# Patient Record
Sex: MALE | Race: WHITE | Employment: FULL TIME | URBAN - METROPOLITAN AREA
[De-identification: names, ages, dates, MRNs, and addresses within clinical notes are randomized per-mention and may not be internally consistent; named-entity substitution may affect disease eponyms.]

---

## 2017-10-10 ENCOUNTER — OFFICE VISIT (OUTPATIENT)
Dept: INTERNAL MEDICINE CLINIC | Age: 32
End: 2017-10-10

## 2017-10-10 VITALS
SYSTOLIC BLOOD PRESSURE: 127 MMHG | WEIGHT: 182 LBS | RESPIRATION RATE: 15 BRPM | OXYGEN SATURATION: 97 % | TEMPERATURE: 99 F | DIASTOLIC BLOOD PRESSURE: 86 MMHG | HEIGHT: 71 IN | HEART RATE: 76 BPM | BODY MASS INDEX: 25.48 KG/M2

## 2017-10-10 DIAGNOSIS — Z23 ENCOUNTER FOR IMMUNIZATION: Primary | ICD-10-CM

## 2017-10-10 DIAGNOSIS — J45.20 MILD INTERMITTENT ASTHMA WITHOUT COMPLICATION: ICD-10-CM

## 2017-10-10 DIAGNOSIS — M62.838 MUSCLE SPASM OF RIGHT SHOULDER: ICD-10-CM

## 2017-10-10 RX ORDER — ALBUTEROL SULFATE 90 UG/1
AEROSOL, METERED RESPIRATORY (INHALATION)
COMMUNITY
End: 2018-04-10 | Stop reason: SDUPTHER

## 2017-10-10 RX ORDER — ALBUTEROL SULFATE 90 UG/1
2 AEROSOL, METERED RESPIRATORY (INHALATION)
Qty: 1 INHALER | Refills: 6 | Status: SHIPPED | OUTPATIENT
Start: 2017-10-10 | End: 2017-10-10 | Stop reason: SDUPTHER

## 2017-10-10 RX ORDER — ALBUTEROL SULFATE 90 UG/1
2 AEROSOL, METERED RESPIRATORY (INHALATION)
Qty: 1 INHALER | Refills: 6 | Status: SHIPPED | OUTPATIENT
Start: 2017-10-10 | End: 2019-07-29 | Stop reason: SDUPTHER

## 2017-10-10 RX ORDER — BISMUTH SUBSALICYLATE 262 MG
1 TABLET,CHEWABLE ORAL DAILY
COMMUNITY
End: 2018-12-01

## 2017-10-10 NOTE — PROGRESS NOTES
ROOM # 8    Roxy Lynn presents today for   Chief Complaint   Patient presents with   [facility] New Patient    Asthma       Roxy Lynn preferred language for health care discussion is english/other. Is someone accompanying this pt? no    Is the patient using any DME equipment during OV? no    Depression Screening:  PHQ over the last two weeks 10/10/2017   Little interest or pleasure in doing things Not at all   Feeling down, depressed or hopeless Not at all   Total Score PHQ 2 0       Learning Assessment:  Learning Assessment 10/10/2017   PRIMARY LEARNER Patient   HIGHEST LEVEL OF EDUCATION - PRIMARY LEARNER  > 4 YEARS OF COLLEGE   BARRIERS PRIMARY LEARNER NONE   CO-LEARNER CAREGIVER No   PRIMARY LANGUAGE ENGLISH   LEARNER PREFERENCE PRIMARY DEMONSTRATION   ANSWERED BY patient   RELATIONSHIP SELF       Abuse Screening:  No flowsheet data found. Fall Risk  No flowsheet data found. Health Maintenance reviewed and discussed per provider. Yes        Advance Directive:  1. Do you have an advance directive in place? Patient Reply: no    2. If not, would you like material regarding how to put one in place? Patient Reply: no    Coordination of Care:  1. Have you been to the ER, urgent care clinic since your last visit? Hospitalized since your last visit? no    2. Have you seen or consulted any other health care providers outside of the [address] [place] [address] since your last visit? Include any pap smears or colon screening. brittani Lynn is a 32 y.o. male who presents for routine immunizations. He denies any symptoms , reactions or allergies that would exclude them from being immunized today. Risks and adverse reactions were discussed and the VIS was given to them. All questions were addressed. He was observed for 10 min post injection. There were no reactions observed.     Mally Kim LPN

## 2017-10-10 NOTE — PATIENT INSTRUCTIONS
Vaccine Information Statement    Influenza (Flu) Vaccine (Inactivated or Recombinant): What you need to know    Many Vaccine Information Statements are available in Wolof and other languages. See www.immunize.org/vis  Hojas de Información Sobre Vacunas están disponibles en Español y en muchos otros idiomas. Visite www.immunize.org/vis    1. Why get vaccinated? Influenza (flu) is a contagious disease that spreads around the [place] every year, usually between October and May. Flu is caused by influenza viruses, and is spread mainly by coughing, sneezing, and close contact. Anyone can get flu. Flu strikes suddenly and can last several days. Symptoms vary by age, but can include:   fever/chills   sore throat   muscle aches   fatigue   cough   headache    runny or stuffy nose    Flu can also lead to pneumonia and blood infections, and cause diarrhea and seizures in children. If you have a medical condition, such as heart or lung disease, flu can make it worse. Flu is more dangerous for some people. Infants and young children, people 72years of age and older, pregnant women, and people with certain health conditions or a weakened immune system are at greatest risk. Each year thousands of people in the [facility] die from flu, and many more are hospitalized. Flu vaccine can:   keep you from getting flu,   make flu less severe if you do get it, and   keep you from spreading flu to your family and other people. 2. Inactivated and recombinant flu vaccines    A dose of flu vaccine is recommended every flu season. Children 6 months through 6years of age may need two doses during the same flu season. Everyone else needs only one dose each flu season.        Some inactivated flu vaccines contain a very small amount of a mercury-based preservative called thimerosal. Studies have not shown thimerosal in vaccines to be harmful, but flu vaccines that do not contain thimerosal are available. There is no live flu virus in flu shots. They cannot cause the flu. There are many flu viruses, and they are always changing. Each year a new flu vaccine is made to protect against three or four viruses that are likely to cause disease in the upcoming flu season. But even when the vaccine doesnt exactly match these viruses, it may still provide some protection    Flu vaccine cannot prevent:   flu that is caused by a virus not covered by the vaccine, or   illnesses that look like flu but are not. It takes about 2 weeks for protection to develop after vaccination, and protection lasts through the flu season. 3. Some people should not get this vaccine    Tell the person who is giving you the vaccine:     If you have any severe, life-threatening allergies. If you ever had a life-threatening allergic reaction after a dose of flu vaccine, or have a severe allergy to any part of this vaccine, you may be advised not to get vaccinated. Most, but not all, types of flu vaccine contain a small amount of egg protein.  If you ever had Guillain-Barré Syndrome (also called GBS). Some people with a history of GBS should not get this vaccine. This should be discussed with your doctor.  If you are not feeling well. It is usually okay to get flu vaccine when you have a mild illness, but you might be asked to come back when you feel better. 4. Risks of a vaccine reaction    With any medicine, including vaccines, there is a chance of reactions. These are usually mild and go away on their own, but serious reactions are also possible. Most people who get a flu shot do not have any problems with it.      Minor problems following a flu shot include:    soreness, redness, or swelling where the shot was given     hoarseness   sore, red or itchy eyes   cough   fever   aches   headache   itching   fatigue  If these problems occur, they usually begin soon after the shot and last 1 or 2 days. More serious problems following a flu shot can include the following:     There may be a small increased risk of Guillain-Barré Syndrome (GBS) after inactivated flu vaccine. This risk has been estimated at 1 or 2 additional cases per million people vaccinated. This is much lower than the risk of severe complications from flu, which can be prevented by flu vaccine.  Young children who get the flu shot along with pneumococcal vaccine (PCV13) and/or DTaP vaccine at the same time might be slightly more likely to have a seizure caused by fever. Ask your doctor for more information. Tell your doctor if a child who is getting flu vaccine has ever had a seizure. Problems that could happen after any injected vaccine:      People sometimes faint after a medical procedure, including vaccination. Sitting or lying down for about 15 minutes can help prevent fainting, and injuries caused by a fall. Tell your doctor if you feel dizzy, or have vision changes or ringing in the ears.  Some people get severe pain in the shoulder and have difficulty moving the arm where a shot was given. This happens very rarely.  Any medication can cause a severe allergic reaction. Such reactions from a vaccine are very rare, estimated at about 1 in a million doses, and would happen within a few minutes to a few hours after the vaccination. As with any medicine, there is a very remote chance of a vaccine causing a serious injury or death. The safety of vaccines is always being monitored. For more information, visit: www.cdc.gov/vaccinesafety/    5. What if there is a serious reaction? What should I look for?  Look for anything that concerns you, such as signs of a severe allergic reaction, very high fever, or unusual behavior.     Signs of a severe allergic reaction can include hives, swelling of the face and throat, difficulty breathing, a fast heartbeat, dizziness, and weakness - usually within a few minutes to a few hours after the vaccination. What should I do?  If you think it is a severe allergic reaction or other emergency that cant wait, call 9-1-1 and get the person to the nearest hospital. Otherwise, call your doctor.  Reactions should be reported to the Vaccine Adverse Event Reporting System (VAERS). Your doctor should file this report, or you can do it yourself through  the VAERS web site at www.vaers. [facility].gov, or by calling 9-231.443.7107. VAERS does not give medical advice. 6. The National Vaccine Injury Compensation Program    The [facility] Vaccine Injury Compensation Program (VICP) is a federal program that was created to compensate people who may have been injured by certain vaccines. Persons who believe they may have been injured by a vaccine can learn about the program and about filing a claim by calling 6-406.751.8192 or visiting the [employer] website at www.[facility].gov/vaccinecompensation. There is a time limit to file a claim for compensation. 7. How can I learn more?  Ask your healthcare provider. He or she can give you the vaccine package insert or suggest other sources of information.  Call your local or state health department.  Contact the Centers for Disease Control and Prevention (CDC):  - Call 4-605.733.6494 (1-800-CDC-INFO) or  - Visit CDCs website at www.cdc.gov/flu    Vaccine Information Statement   Inactivated Influenza Vaccine   8/7/2015  42 RUTH Brid[address] Tse [id number]    Department of Health and Human Services  Centers for Disease Control and Prevention    Office Use Only

## 2017-10-10 NOTE — MR AVS SNAPSHOT
Visit Information Date & Time Provider Department Dept. Phone Encounter #  
 10/10/2017  9:00 AM Thelma Calle Aasonn [phone] [id number] Upcoming Health Maintenance Date Due DTaP/Tdap/Td series (1 - Tdap) 12/30/2006 INFLUENZA AGE 9 TO ADULT 8/1/2017 Allergies as of 10/10/2017  Review Complete On: 10/10/2017 By: Sergei Scott LPN Severity Noted Reaction Type Reactions Sulfa (Sulfonamide Antibiotics)  10/10/2017    Hives Current Immunizations  Never Reviewed Name Date Influenza Vaccine (Quad) PF  Incomplete Not reviewed this visit You Were Diagnosed With   
  
 Codes Comments Encounter for immunization    -  Primary ICD-10-CM: E57 ICD-9-CM: V03.89 Mild intermittent asthma without complication     CPL-44-YZ: J45.20 ICD-9-CM: 493.90 Muscle spasm of right shoulder     ICD-10-CM: G90.181 ICD-9-CM: 728.85 Vitals BP Pulse Temp Resp Height(growth percentile) Weight(growth percentile) 127/86 76 99 °F (37.2 °C) (Oral) 15 5' 11\" (1.803 m) 182 lb (82.6 kg) SpO2 BMI Smoking Status 97% 25.38 kg/m2 Never Smoker BMI and BSA Data Body Mass Index Body Surface Area  
 25.38 kg/m 2 2.03 m 2 Preferred Pharmacy Pharmacy Name Phone [id number], [address] [place] [address] Jesse Gardiner [phone] Your Updated Medication List  
  
   
This list is accurate as of: 10/10/17  9:15 AM.  Always use your most recent med list.  
  
  
  
  
 * albuterol 90 mcg/actuation inhaler Commonly known as:  PROVENTIL HFA, VENTOLIN HFA, PROAIR HFA Take  by inhalation. * albuterol 90 mcg/actuation inhaler Commonly known as:  PROVENTIL HFA, VENTOLIN HFA, PROAIR HFA Take 2 Puffs by inhalation every four (4) hours as needed for Wheezing. inhalational spacing device 1 Each by Does Not Apply route as needed. multivitamin tablet Commonly known as:  ONE A DAY Take 1 Tab by mouth daily. * Notice: This list has 2 medication(s) that are the same as other medications prescribed for you. Read the directions carefully, and ask your doctor or other care provider to review them with you. Prescriptions Printed Refills  
 albuterol (PROVENTIL HFA, VENTOLIN HFA, PROAIR HFA) 90 mcg/actuation inhaler 6 Sig: Take 2 Puffs by inhalation every four (4) hours as needed for Wheezing. Class: Print Route: Inhalation  
 inhalational spacing device 0 Si Each by Does Not Apply route as needed. Class: Print Route: Does Not Apply We Performed the Following INFLUENZA VIRUS VAC QUAD,SPLIT,PRESV FREE SYRINGE IM [id number] CPT(R)] REFERRAL TO PHYSICAL THERAPY [FJI82 Custom] Comments:  
 Right shoulder spasm and intermittent pain Referral Information Referral ID Referred By Referred To  
  
 [id number] [facility], [place] [address] Phone: [phone] Visits Status Start Date End Date 1 New Request 10/10/17 10/10/18 If your referral has a status of pending review or denied, additional information will be sent to support the outcome of this decision. Patient Instructions Vaccine Information Statement Influenza (Flu) Vaccine (Inactivated or Recombinant): What you need to know Many Vaccine Information Statements are available in Bermudian and other languages. See www.immunize.org/vis Hojas de Información Sobre Vacunas están disponibles en Español y en muchos otros idiomas. Visite www.immunize.org/vis 1. Why get vaccinated? Influenza (flu) is a contagious disease that spreads around the [place] every year, usually between October and May. Flu is caused by influenza viruses, and is spread mainly by coughing, sneezing, and close contact. Anyone can get flu. Flu strikes suddenly and can last several days. Symptoms vary by age, but can include: 
 fever/chills  sore throat  muscle aches  fatigue  cough  headache  runny or stuffy nose Flu can also lead to pneumonia and blood infections, and cause diarrhea and seizures in children. If you have a medical condition, such as heart or lung disease, flu can make it worse. Flu is more dangerous for some people. Infants and young children, people 72years of age and older, pregnant women, and people with certain health conditions or a weakened immune system are at greatest risk. Each year thousands of people in the [facility] die from flu, and many more are hospitalized. Flu vaccine can: 
 keep you from getting flu, 
 make flu less severe if you do get it, and 
 keep you from spreading flu to your family and other people. 2. Inactivated and recombinant flu vaccines A dose of flu vaccine is recommended every flu season. Children 6 months through 6years of age may need two doses during the same flu season. Everyone else needs only one dose each flu season. Some inactivated flu vaccines contain a very small amount of a mercury-based preservative called thimerosal. Studies have not shown thimerosal in vaccines to be harmful, but flu vaccines that do not contain thimerosal are available. There is no live flu virus in flu shots. They cannot cause the flu. There are many flu viruses, and they are always changing. Each year a new flu vaccine is made to protect against three or four viruses that are likely to cause disease in the upcoming flu season. But even when the vaccine doesnt exactly match these viruses, it may still provide some protection Flu vaccine cannot prevent: 
 flu that is caused by a virus not covered by the vaccine, or 
 illnesses that look like flu but are not.  
 
It takes about 2 weeks for protection to develop after vaccination, and protection lasts through the flu season. 3. Some people should not get this vaccine Tell the person who is giving you the vaccine:  If you have any severe, life-threatening allergies. If you ever had a life-threatening allergic reaction after a dose of flu vaccine, or have a severe allergy to any part of this vaccine, you may be advised not to get vaccinated. Most, but not all, types of flu vaccine contain a small amount of egg protein.  If you ever had Guillain-Barré Syndrome (also called GBS). Some people with a history of GBS should not get this vaccine. This should be discussed with your doctor.  If you are not feeling well. It is usually okay to get flu vaccine when you have a mild illness, but you might be asked to come back when you feel better. 4. Risks of a vaccine reaction With any medicine, including vaccines, there is a chance of reactions. These are usually mild and go away on their own, but serious reactions are also possible. Most people who get a flu shot do not have any problems with it. Minor problems following a flu shot include:  
 soreness, redness, or swelling where the shot was given  hoarseness  sore, red or itchy eyes  cough  fever  aches  headache  itching  fatigue If these problems occur, they usually begin soon after the shot and last 1 or 2 days. More serious problems following a flu shot can include the following:  There may be a small increased risk of Guillain-Barré Syndrome (GBS) after inactivated flu vaccine. This risk has been estimated at 1 or 2 additional cases per million people vaccinated. This is much lower than the risk of severe complications from flu, which can be prevented by flu vaccine.    
 
 Young children who get the flu shot along with pneumococcal vaccine (PCV13) and/or DTaP vaccine at the same time might be slightly more likely to have a seizure caused by fever. Ask your doctor for more information. Tell your doctor if a child who is getting flu vaccine has ever had a seizure. Problems that could happen after any injected vaccine:  People sometimes faint after a medical procedure, including vaccination. Sitting or lying down for about 15 minutes can help prevent fainting, and injuries caused by a fall. Tell your doctor if you feel dizzy, or have vision changes or ringing in the ears.  Some people get severe pain in the shoulder and have difficulty moving the arm where a shot was given. This happens very rarely.  Any medication can cause a severe allergic reaction. Such reactions from a vaccine are very rare, estimated at about 1 in a million doses, and would happen within a few minutes to a few hours after the vaccination. As with any medicine, there is a very remote chance of a vaccine causing a serious injury or death. The safety of vaccines is always being monitored. For more information, visit: www.cdc.gov/vaccinesafety/ 
 
5. What if there is a serious reaction? What should I look for?  Look for anything that concerns you, such as signs of a severe allergic reaction, very high fever, or unusual behavior. Signs of a severe allergic reaction can include hives, swelling of the face and throat, difficulty breathing, a fast heartbeat, dizziness, and weakness  usually within a few minutes to a few hours after the vaccination. What should I do?  If you think it is a severe allergic reaction or other emergency that cant wait, call 9-1-1 and get the person to the nearest hospital. Otherwise, call your doctor.  Reactions should be reported to the Vaccine Adverse Event Reporting System (VAERS). Your doctor should file this report, or you can do it yourself through  the VAERS web site at www.vaers. [facility].gov, or by calling 4-869.366.1804. VAERS does not give medical advice. 6. The National Vaccine Injury Compensation Program 
 
The [facility] Vaccine Injury Compensation Program (VICP) is a federal program that was created to compensate people who may have been injured by certain vaccines. Persons who believe they may have been injured by a vaccine can learn about the program and about filing a claim by calling 7-717.247.9082 or visiting the [employer] website at www.[facility].gov/vaccinecompensation. There is a time limit to file a claim for compensation. 7. How can I learn more?  Ask your healthcare provider. He or she can give you the vaccine package insert or suggest other sources of information.  Call your local or state health department.  Contact the Centers for Disease Control and Prevention (CDC): 
- Call 7-704.126.9177 (0-190-TPO-INFO) or 
- Visit CDCs website at www.cdc.gov/flu Vaccine Information Statement Inactivated Influenza Vaccine 2015 
42 RUTH Dewey [id number] [facility] and [place] Centers for Disease Control and Prevention Office Use Only Introducing [facility]! [facility] introduces Definicare patient portal. Now you can access parts of your medical record, email your doctor's office, and request medication refills online. 1. In your internet browser, go to [email or web address] 2. Click on the First Time User? Click Here link in the Sign In box. You will see the New Member Sign Up page. 3. Enter your Definicare Access Code exactly as it appears below. You will not need to use this code after youve completed the sign-up process. If you do not sign up before the expiration date, you must request a new code. · Definicare Access Code: [email or web address] Expires: 2018  9:11 AM 
 
4. Enter the last four digits of your Social Security Number (xxxx) and Date of Birth (mm/dd/yyyy) as indicated and click Submit. You will be taken to the next sign-up page. 5. Create a Ravenflow ID. This will be your Ravenflow login ID and cannot be changed, so think of one that is secure and easy to remember. 6. Create a Ravenflow password. You can change your password at any time. 7. Enter your Password Reset Question and Answer. This can be used at a later time if you forget your password. 8. Enter your e-mail address. You will receive e-mail notification when new information is available in [address] 9. Click Sign Up. You can now view and download portions of your medical record. 10. Click the Download Summary menu link to download a portable copy of your medical information. If you have questions, please visit the Frequently Asked Questions section of the Ravenflow website. Remember, Ravenflow is NOT to be used for urgent needs. For medical emergencies, dial 911. Now available from your iPhone and Android! Please provide this summary of care documentation to your next provider. Your primary care clinician is listed as Shashank Velasquez. If you have any questions after today's visit, please call [phone].

## 2017-10-10 NOTE — PROGRESS NOTES
FAMILY MEDICINE CLINIC NOTE    S: The patient presents for establishment of care. He has a medical history significant for asthma. He does not utilize albuterol very often. He does not utilize a spacer. Takes claritin intermittently. He has never been hospitalized for his asthma. Right shoulder spasm and pain for the last few months, bothersome intermittently. Started when lifting during a move. He would like a flu shot today    No current outpatient prescriptions on file prior to visit. No current facility-administered medications on file prior to visit. Past Medical History:   Diagnosis Date    Asthma     Eczema        Social History     Social History    Marital status:      Spouse name: N/A    Number of children: N/A    Years of education: N/A     Occupational History    Not on file. Social History Main Topics    Smoking status: Never Smoker    Smokeless tobacco: Never Used    Alcohol use 4.8 oz/week     2 Glasses of wine, 4 Cans of beer, 2 Shots of liquor per week    Drug use: No    Sexual activity: Yes     Birth control/ protection: None     Other Topics Concern    Not on file     Social History Narrative    No narrative on file       Family History   Problem Relation Age of Onset    Thyroid Disease Mother     Osteoporosis Mother     No Known Problems Father        O:  Visit Vitals    /86    Pulse 76    Temp 99 °F (37.2 °C) (Oral)    Resp 15    Ht 5' 11\" (1.803 m)    Wt 182 lb (82.6 kg)    SpO2 97%    BMI 25.38 kg/m2     NAD, comfortable  RRR, no murmurs  CTABL, no wheezing/ronchi/rales  Mild spasm along the medial border of the right scapula, to TTP     32 y.o. male      ICD-10-CM ICD-9-CM    1. Encounter for immunization Z23 V03.89 INFLUENZA VIRUS VAC QUAD,SPLIT,PRESV FREE SYRINGE IM   2.  Mild intermittent asthma without complication J98.63 622.75 albuterol (PROVENTIL HFA, VENTOLIN HFA, PROAIR HFA) 90 mcg/actuation inhaler      inhalational spacing device      DISCONTINUED: albuterol (PROVENTIL HFA, VENTOLIN HFA, PROAIR HFA) 90 mcg/actuation inhaler      DISCONTINUED: inhalational spacing device   3.  Muscle spasm of right shoulder M62.838 728.85 REFERRAL TO PHYSICAL THERAPY

## 2017-10-20 ENCOUNTER — HOSPITAL ENCOUNTER (OUTPATIENT)
Dept: PHYSICAL THERAPY | Age: 32
Discharge: HOME | End: 2017-10-20
Payer: COMMERCIAL

## 2017-10-20 PROCEDURE — 97162 PT EVAL MOD COMPLEX 30 MIN: CPT

## 2017-10-20 PROCEDURE — 97014 ELECTRIC STIMULATION THERAPY: CPT

## 2017-10-20 PROCEDURE — 97110 THERAPEUTIC EXERCISES: CPT

## 2017-10-20 NOTE — PROGRESS NOTES
[facility] PHYSICAL THERAPY AT 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   ========================================================================  Assessment / key information:  Pt is a 32y.o. year old male who presents with co R shoulder pain, decreased mobility and strength after moving heavy boxes in late 2017. Patient denies popping, but residual pain the day after moving. Pain is mostly present with increased activity. Patient is R hand dominant. Pain relief via IBuprofen as needed. PMH signifiant for sciatica. Current deficits include: increased pain to 6/10 at worst, decreased AROM  Per chart ([facility] ), strength grossly 4+ GH and 4/5 SA but with signficant compensation when testing SA and LT, small TRP of R LS sec to compensatory overuse. Functional deficits include: reaching overhead, lifting and playing with 2yo and 2 yo, work duties: : print making, heavy lifting, prolonged use :5 min,  rec activities: cycling , yoga, stretching . Home exercise program initiated on initial evaluation to address these deficits. Pt would benefit from PT to address these deficits for increased functional mobility and QOL.       AROM                                                                Left Right   Flexion   96   Extension       Scaption/ABD   114   ER @ 0 Degrees       IR/ADD T2 T5       ========================================================================  Eval Complexity: History: MEDIUM  Complexity : 1-2 comorbidities / personal factors will impact the outcome/ POC Exam:HIGH Complexity : 4+ Standardized tests and measures addressing body structure, function, activity limitation and / or participation in recreation  Presentation: MEDIUM Complexity : Evolving with changing characteristics  Clinical Decision Making:MEDIUM Complexity : FOTO score of 26-74Overall Complexity:MEDIUM  Problem List: pain affecting function, decrease ROM, decrease strength, impaired gait/ balance, decrease ADL/ functional abilitiies, decrease activity tolerance, decrease flexibility/ joint mobility and other FOTO 62/100   Treatment Plan may include any combination of the following: Therapeutic exercise, Therapeutic activities, Neuromuscular re-education, Physical agent/modality, Gait/balance training, Manual therapy, Aquatic therapy, Patient education, Self Care training and Functional mobility training  Patient / Family readiness to learn indicated by: asking questions, trying to perform skills and interest  Persons(s) to be included in education: patient (P)  Barriers to Learning/Limitations: None  Measures taken:    Patient Goal (s): \"increase mobility and strength, normal movement of the shoulder \"   Patient self reported health status: good  Rehabilitation Potential: good   Short Term Goals: To be accomplished in  1  weeks:  1. Pt will be independent and compliant with HEP   Long Term Goals: To be accomplished in  8-12  treatments:  1. Patient will increase FOTO score to 78/100 for indications of increased functional mobility. 2.  Patient will demo increased AROM flexion to 140 deg for ease with repeated overhead activity   3.  Patient will demo 4+/5 SA strength without UT compensation for improve scapular stability with arm elevation   4. Patient will report increased activity tolerance to 15 min with R UE for work duties for progression to PLOF   Frequency / Duration:   Patient to be seen  2-3  times per week for 8-12  treatments:  Patient / Caregiver education and instruction: self care, activity modification and exercises  G-Codes (GP): ARGELIA  Therapist Signature: Luzmaria Mahmood PT Date: 41/29/2190   Certification Period: NA Time: 1033am    ========================================================================  I certify that the above Physical Therapy Services are being furnished while the patient is under my care. I agree with the treatment plan and certify that this therapy is necessary. Physician Signature:        Date:       Time:   Please sign and return to [facility] or you may fax the signed copy to [phone]. Thank you.

## 2017-10-20 NOTE — PROGRESS NOTES
PT SHOULDER EVAL AND TREATMENT      Patient Name: Connor Abbott  Date:10/20/2017  : 1985  [x]  Patient  Verified  Payor: BLUE CROSS / Plan: [employer] [id number] PPO / Product Type: PPO /    In time:935  Out time:1025  Total Treatment Time (min): 50  Visit #: 1 of -12    Treatment Area: Acute pain of right shoulder [M25.511]    SUBJECTIVE  Pain Level (0-10 scale):  (C): 0 (B):0  (W):  6  Any medication changes, allergies to medications, diagnosis change, or new procedure performed: see summary sheet for update  Subjective functional status/changes:  CHIEF COMPLAINT:  Patient reports with co R shoulder pain, decreased mobility and strength after moving heavy boxes in late 2017. Patient denies popping, but residual pain the day after moving. Pain is mostly present with increased activity. Patient is R hand dominant. Pain relief via IBuprofen as needed. PMH signifiant for sciatica   DEFICITS: reaching overhead, lifting and playing with 2yo and 2 yo, work duties: : [employer], heavy lifting, prolonged use :5 min,  rec activities: cycling , yoga, stretching   OBJECTIVE:   Posture: scap protraction, winging  and instability   Rounded shoulders     ROM:  Referred pain with scaption                                            AROM                                                              PROM   Left Right  Left Right   Flexion  96 Flexion  WNL   Extension   Extension     Scaption/ABD  114 Scaptin/ABD  WNL   ER @ 0 Degrees   ER @ 0 Degrees     IR/ADD T2 T5 IR/ADD       Strength:                                                                         L (1-5) R (1-5) Pain   Flexors 5 4+ _ Yes   [] No   Abductors 5 4+ [] Yes   [] No   External Rotators 5 5 [] Yes   [] No   Internal Rotators 5 5 [] Yes   [] No   Lower Trapezius 4+ 4+ [] Yes   [] No   Elbow Flexion 5 5 [] Yes   [] No   Elbow Extension 5 5 [] Yes   [] No   Serratus anterior   4 compensation    strength :   WNL Scapulohumoral Control / Rhythm:  Able to eccentrically lower with good control?  Left: [x] Yes   [] No     Right: [x] Yes   [] No    Palpation  [x] Min  [] Mod  [] Severe    Location: small trigger point L LS     Other Tests / Comments:     Patient Education/ Therapeutic Exercise : [x] Discussed POT including PT expectation, established HEP with pictures and instruction,   (minutes) : 13    Manual: NT     Modality (rationale): promote m [facility] [place] and ST joints   [x]  E-Stim: type [place] to middle deltoid and LT - 5 on 10 off with active arm elevation off mat table: 10 min      Pain Level (0-10 scale) post treatment: 0    ASSESSMENT  [x]  See Plan of Care    PLAN  [x]  Upgrade activities as tolerated  [x] Other:_POC   2-3 x8-12    Humaira Dwyer, PT 10/20/2017      Justification for Eval Code Complexity:  Patient History : 4 mo hx without treatment   Examination see exam   Clinical Presentation: evolving sec to compensatory patterns established   Clinical Decision Making : FOTO : 58 /100

## 2017-10-24 ENCOUNTER — HOSPITAL ENCOUNTER (OUTPATIENT)
Dept: PHYSICAL THERAPY | Age: 32
Discharge: HOME | End: 2017-10-24
Payer: COMMERCIAL

## 2017-10-24 PROCEDURE — 97014 ELECTRIC STIMULATION THERAPY: CPT | Performed by: PHYSICAL THERAPIST

## 2017-10-24 PROCEDURE — 97110 THERAPEUTIC EXERCISES: CPT | Performed by: PHYSICAL THERAPIST

## 2017-10-24 PROCEDURE — 97140 MANUAL THERAPY 1/> REGIONS: CPT | Performed by: PHYSICAL THERAPIST

## 2017-10-24 NOTE — PROGRESS NOTES
PT DAILY TREATMENT NOTE     Patient Name: Addie Adame  Date:10/24/2017  : 1985  [x]  Patient  Verified  Payor: BLUE CROSS / Plan: [facility] PPO / Product Type: PPO /    In time:224  Out time:326  Total Treatment Time (min): 62  Total Timed Codes (min): 57  1:1 Treatment Time (min):    Visit #: 2 of     Treatment Area: Acute pain of right shoulder [M25.511]    SUBJECTIVE  Pain Level (0-10 scale): 0  Any medication changes, allergies to medications, adverse drug reactions, diagnosis change, or new procedure performed?: [x] No    [] Yes (see summary sheet for update)  Subjective functional status/changes:   [x] No changes reported      OBJECTIVE  Modality rationale: increase muscle contraction/control to improve the patients ability to improve scapular stability   Min Type Additional Details   10 [] Estim: []Att   []Unatt        []TENS instruct                  []IFC  []Premod   [x]NMES                     []Other:  []w/US   []w/ice   []w/heat  Position:seated   Location: R deltoid/LT with active lift    []  Traction: [] Cervical       []Lumbar                       [] Prone          []Supine                       []Intermittent   []Continuous Lbs:  [] before manual  [] after manual    []  Ultrasound: []Continuous   [] Pulsed                           []1MHz   []3MHz Location:  W/cm2:    []  Iontophoresis with dexamethasone         Location: [] Take home patch   [] In clinic    []  Ice     []  heat  []  Ice massage Position:  Location:    []  Vasopneumatic Device Pressure:       [] lo [] med [] hi   Temperature: [] lo [] med [] hi   [] Skin assessment post-treatment:  []intact []redness- no adverse reaction       []redness  adverse reaction:       42 min initiated POC    Rationale: increase strength, improve coordination and increase proprioception to improve the patients ability to reach and lift OH       10 min Manual Therapy:  R LS TrP R and DTM   Rationale: increase tissue extensibility, decrease trigger points and increase postural awareness to perform functional mobility, OH lifting and improve activity  endurance       min Gait Training:  ___ feet with ___ device on level surfaces with ___ level of assist   Rationale:           min Patient Education: [x] Review HEP    [] Progressed/Changed HEP based on:   [] positioning   [] body mechanics   [] transfers   [] heat/ice application        Other Objective/Functional Measures: initiated [place] [address] per flow sheet, [place] to mid deltoid and LT with active lift, significant winging on R noted  With prone push up +plank with tc/vc needed. TrP R to R LS mm. Pain Level (0-10 scale) post treatment: 0    ASSESSMENT/Changes in Function:  Patient tolerated initial therapy session well with R scapular winging noted for all therex. Tightness and TrP noted in R LS mm. Instructed in self TrP R for HEP    Patient will continue to benefit from skilled PT services to modify and progress therapeutic interventions, address functional mobility deficits, address ROM deficits, address strength deficits, analyze and address soft tissue restrictions, analyze and cue movement patterns, analyze and modify body mechanics/ergonomics and assess and modify postural abnormalities to attain remaining goals.      []  See Plan of Care  []  See progress note/recertification  []  See Discharge Summary         Progress towards goals / Updated goals:  firsti FU visit    PLAN  [x]  Upgrade activities as tolerated     [x]  Continue plan of care  []  Update interventions per flow sheet       []  Discharge due to:_  []  Other:_      Kumar Nguyen, PT 10/24/2017  11:07 AM

## 2017-10-26 ENCOUNTER — HOSPITAL ENCOUNTER (OUTPATIENT)
Dept: PHYSICAL THERAPY | Age: 32
Discharge: HOME | End: 2017-10-26
Payer: COMMERCIAL

## 2017-10-26 PROCEDURE — 97014 ELECTRIC STIMULATION THERAPY: CPT | Performed by: PHYSICAL THERAPIST

## 2017-10-26 PROCEDURE — 97110 THERAPEUTIC EXERCISES: CPT | Performed by: PHYSICAL THERAPIST

## 2017-10-26 NOTE — PROGRESS NOTES
PT DAILY TREATMENT NOTE     Patient Name: Chris Su  Date:10/26/2017  : 1985  [x]  Patient  Verified  Payor: BLUE CROSS / Plan: [facility] PPO / Product Type: PPO /    In time:402  Out time:510pm  Total Treatment Time (min): 68  Total Timed Codes (min): 63  1:1 Treatment Time (min):    Visit #: 3 of     Treatment Area: Acute pain of right shoulder [M25.511]    SUBJECTIVE  Pain Level (0-10 scale): 0  Any medication changes, allergies to medications, adverse drug reactions, diagnosis change, or new procedure performed?: [x] No    [] Yes (see summary sheet for update)  Subjective functional status/changes:   [x] No changes reported      OBJECTIVE  Modality rationale: increase muscle contraction/control to improve the patients ability to improve scapular stability   Min Type Additional Details   10 [x] Estim: []Att   []Unatt        []TENS instruct                  []IFC  []Premod   [x]NMES                     []Other:  []w/US   []w/ice   []w/heat  Position:seated   Location: R deltoid/LT with active lift    []  Traction: [] Cervical       []Lumbar                       [] Prone          []Supine                       []Intermittent   []Continuous Lbs:  [] before manual  [] after manual    []  Ultrasound: []Continuous   [] Pulsed                           []1MHz   []3MHz Location:  W/cm2:    []  Iontophoresis with dexamethasone         Location: [] Take home patch   [] In clinic    []  Ice     []  heat  []  Ice massage Position:  Location:    []  Vasopneumatic Device Pressure:       [] lo [] med [] hi   Temperature: [] lo [] med [] hi   [] Skin assessment post-treatment:  []intact []redness- no adverse reaction       []redness  adverse reaction:       53 min Therapeutic exercise   Rationale: increase strength, improve coordination and increase proprioception to improve the patients ability to reach and lift OH       5 min Manual Therapy:  R LS TrP R and DTM, to pect mm Rationale: increase tissue extensibility, decrease trigger points and increase postural awareness to perform functional mobility, OH lifting and improve activity  endurance               min Patient Education: [x] Review HEP    [] Progressed/Changed HEP based on:   [] positioning   [] body mechanics   [] transfers   [] heat/ice application        Other Objective/Functional Measures:  addd rocking back on heels or 120deg shoulder flexion for SA quad plank, adjusted tall plank to incline at mat as patient had sever winging with prone, added lat stair stretch and pect minor 90 90 stretch with active ER at wall. Pain Level (0-10 scale) post treatment: 0    ASSESSMENT/Changes in Function:   R scapular winging noted for all therex. Tightness and TrP noted in R LS, and Pect  mm. Instructed in self TrP R for HEP    Patient will continue to benefit from skilled PT services to modify and progress therapeutic interventions, address functional mobility deficits, address ROM deficits, address strength deficits, analyze and address soft tissue restrictions, analyze and cue movement patterns, analyze and modify body mechanics/ergonomics and assess and modify postural abnormalities to attain remaining goals. []  See Plan of Care  []  See progress note/recertification  []  See Discharge Summary         Progress towards goals / Updated goals: · Short Term Goals: To be accomplished in  1  weeks:  1. Pt will be independent and compliant with HEP  · Long Term Goals: To be accomplished in  8-12  treatments:  1. Patient will increase FOTO score to 78/100 for indications of increased functional mobility. 2.  Patient will demo increased AROM flexion to 140 deg for ease with repeated overhead activity  Progressing with PROM full 10-26-17  3. Patient will demo 4+/5 SA strength without UT compensation for improve scapular stability with arm elevation    4.  Patient will report increased activity tolerance to 15 min with R UE for work duties for progression to [employer]  [x]  Upgrade activities as tolerated     [x]  Continue plan of care  []  Update interventions per flow sheet       []  Discharge due to:_  []  Other:_      Ellison Najjar, PT 10/26/2017  11:07 AM

## 2017-10-31 ENCOUNTER — APPOINTMENT (OUTPATIENT)
Dept: PHYSICAL THERAPY | Age: 32
End: 2017-10-31
Payer: COMMERCIAL

## 2017-11-02 ENCOUNTER — HOSPITAL ENCOUNTER (OUTPATIENT)
Dept: PHYSICAL THERAPY | Age: 32
Discharge: HOME | End: 2017-11-02
Payer: COMMERCIAL

## 2017-11-02 PROCEDURE — 97140 MANUAL THERAPY 1/> REGIONS: CPT

## 2017-11-02 PROCEDURE — 97014 ELECTRIC STIMULATION THERAPY: CPT

## 2017-11-02 PROCEDURE — 97110 THERAPEUTIC EXERCISES: CPT

## 2017-11-02 NOTE — PROGRESS NOTES
PT DAILY TREATMENT NOTE     Patient Name: Sanju Tran  Date:2017  : 1985  [x]  Patient  Verified  Payor: BLUE CROSS / Plan: [facility] PPO / Product Type: PPO /    In time:224 Out time:331  Total Treatment Time (min): 79  Visit #: 4 of     Treatment Area: Acute pain of right shoulder [M25.511]    SUBJECTIVE  Pain Level (0-10 scale): 2-3  Any medication changes, allergies to medications, adverse drug reactions, diagnosis change, or new procedure performed?: [x] No    [] Yes (see summary sheet for update)  Subjective functional status/changes:   [x] No changes reported  \"My son is sick and I have been holding my son a lot more so I am sore. \"    OBJECTIVE  Modality rationale: increase muscle contraction/control to improve the patients ability to improve scapular stability   Min Type Additional Details   10 [x] Estim: []Att   []Unatt        []TENS instruct                  []IFC  []Premod   [x]NMES                     [x]Other: 5 on 10 off  []w/US   []w/ice   [x]w/heat  Position:seated   Location: R deltoid/LT with active lift    []  Traction: [] Cervical       []Lumbar                       [] Prone          []Supine                       []Intermittent   []Continuous Lbs:  [] before manual  [] after manual    []  Ultrasound: []Continuous   [] Pulsed                           []1MHz   []3MHz Location:  W/cm2:    []  Iontophoresis with dexamethasone         Location: [] Take home patch   [] In clinic    []  Ice     []  heat  []  Ice massage Position:  Location:    []  Vasopneumatic Device Pressure:       [] lo [] med [] hi   Temperature: [] lo [] med [] hi   [x] Skin assessment post-treatment:  [x]intact []redness- no adverse reaction       []redness  adverse reaction:       47 min Therapeutic exercise: per FS   Rationale: increase strength, improve coordination and increase proprioception to improve the patients ability to reach and lift OH     10 min Manual Therapy:  R LS, UT and rhomboid TrP R and DTM    Rationale: increase tissue extensibility, decrease trigger points and increase postural awareness to perform functional mobility, OH lifting and improve activity  endurance    x min Patient Education: [x] Review HEP        Other Objective/Functional Measures:    STG 1 - HEP compliance : compliance 1x every other day sec to son illness and halloween    Subjective improvement: ROM and pain    Improved winging with quad SA and prone letters       Pain Level (0-10 scale) post treatment: 0    ASSESSMENT/Changes in Function: Patient reports intermittent compliance with self TRP educated on last session . Patient has better awareness of scap retraction. Challenged by prone \"I\"s and patient reports increased UT activation sec to postural weaknesses. Cont trp of R CS Added MHP to NMES to UT for post treatment m relaxation. Patient will continue to benefit from skilled PT services to modify and progress therapeutic interventions, address functional mobility deficits, address ROM deficits, address strength deficits, analyze and address soft tissue restrictions, analyze and cue movement patterns, analyze and modify body mechanics/ergonomics and assess and modify postural abnormalities to attain remaining goals. [x]  See Plan of Care  []  See progress note/recertification  []  See Discharge Summary         Progress towards goals / Updated goals: · Short Term Goals: To be accomplished in  1  weeks:  1. Pt will be independent and compliant with HEP - Goal progressing - HEP est with no questions. HEP will be modified and progressed as appropriate 11/2/17  · Long Term Goals: To be accomplished in  8-12  treatments:  1. Patient will increase FOTO score to 78/100 for indications of increased functional mobility. 2.  Patient will demo increased AROM flexion to 140 deg for ease with repeated overhead activity  Progressing with PROM full 10-26-17  3.  Patient will demo 4+/5 SA strength without UT compensation for improve scapular stability with arm elevation    4.  Patient will report increased activity tolerance to 15 min with R UE for work duties for progression to [facility]  [x]  Upgrade activities as tolerated     [x]  Continue plan of care  []  Update interventions per flow sheet       []  Discharge due to:_  [x]  Other:_cont to challenge SA and postural muscles       Annette Wagner, PT 11/2/2017  11:07 AM

## 2017-11-07 ENCOUNTER — HOSPITAL ENCOUNTER (OUTPATIENT)
Dept: PHYSICAL THERAPY | Age: 32
Discharge: HOME | End: 2017-11-07
Payer: COMMERCIAL

## 2017-11-07 PROCEDURE — 97110 THERAPEUTIC EXERCISES: CPT

## 2017-11-07 PROCEDURE — 97014 ELECTRIC STIMULATION THERAPY: CPT

## 2017-11-07 PROCEDURE — 97140 MANUAL THERAPY 1/> REGIONS: CPT

## 2017-11-07 NOTE — PROGRESS NOTES
PT DAILY TREATMENT NOTE     Patient Name: Rahel Marquis  Date:2017  : 1985  [x]  Patient  Verified  Payor: BLUE CROSS / Plan: [facility] PPO / Product Type: PPO /    In time:231 Out time:345  Total Treatment Time (min): 74  Visit #: 5 of     Treatment Area: Acute pain of right shoulder [M25.511]    SUBJECTIVE  Pain Level (0-10 scale): 0  Any medication changes, allergies to medications, adverse drug reactions, diagnosis change, or new procedure performed?: [x] No    [] Yes (see summary sheet for update)  Subjective functional status/changes:   [x] No changes reported  \"Doing pretty good today. Getting better. \"    OBJECTIVE  Modality rationale: increase muscle contraction/control to improve the patients ability to improve scapular stability   Min Type Additional Details   10 [x] Estim: []Att   []Unatt        []TENS instruct                  []IFC  []Premod   [x]NMES                     [x]Other: 5 on 10 off  []w/US   []w/ice   [x]w/heat  Position:seated   Location: R deltoid/LT with active lift    []  Traction: [] Cervical       []Lumbar                       [] Prone          []Supine                       []Intermittent   []Continuous Lbs:  [] before manual  [] after manual    []  Ultrasound: []Continuous   [] Pulsed                           []1MHz   []3MHz Location:  W/cm2:    []  Iontophoresis with dexamethasone         Location: [] Take home patch   [] In clinic    []  Ice     []  heat  []  Ice massage Position:  Location:    []  Vasopneumatic Device Pressure:       [] lo [] med [] hi   Temperature: [] lo [] med [] hi   [x] Skin assessment post-treatment:  [x]intact []redness- no adverse reaction       []redness  adverse reaction:       54 min Therapeutic exercise: per FS   Rationale: increase strength, improve coordination and increase proprioception to improve the patients ability to reach and lift OH     10 min Manual Therapy:   In seated R LS, UT and rhomboid TrP R and DTM    Rationale: increase tissue extensibility, decrease trigger points and increase postural awareness to perform functional mobility, OH lifting and improve activity  endurance    x min Patient Education: [x] Review HEP   - progressed HEP pictures including band rows, extension, quad SA and LS stretch      Other Objective/Functional Measures:    LTG 2 - AROM flexion 170 deg     Improvement: \"The mobility is markedly improved, but the winging is still there. \"   Palpation: improved scap positioning with contraction        Pain Level (0-10 scale) post treatment: 0    ASSESSMENT/Changes in Function: Patient educated on winging assocated with scapular stability and strength which can take up to 8 weeks or greater to achieve. Progressed HEP for continued self management. Tolerated new therex well - no co pain , but challenged by TB V and UE step ups due to strength deficits. Encouraged SA contraction with all therex. Patient will continue to benefit from skilled PT services to modify and progress therapeutic interventions, address functional mobility deficits, address ROM deficits, address strength deficits, analyze and address soft tissue restrictions, analyze and cue movement patterns, analyze and modify body mechanics/ergonomics and assess and modify postural abnormalities to attain remaining goals. [x]  See Plan of Care  []  See progress note/recertification  []  See Discharge Summary         Progress towards goals / Updated goals: · Short Term Goals: To be accomplished in  1  weeks:  1. Pt will be independent and compliant with HEP - Goal progressing - HEP est with no questions. HEP will be modified and progressed as appropriate 11/2/17  · Long Term Goals: To be accomplished in  8-12  treatments:  1. Patient will increase FOTO score to 78/100 for indications of increased functional mobility.     2.  Patient will demo increased AROM flexion to 140 deg for ease with repeated overhead activity Goal met at 160 deg  11/7/17  3. Patient will demo 4+/5 SA strength without UT compensation for improve scapular stability with arm elevation    4.  Patient will report increased activity tolerance to 15 min with R UE for work duties for progression to [address]  [x]  Upgrade activities as tolerated     [x]  Continue plan of care  []  Update interventions per flow sheet       []  Discharge due to:_  [x]  Other:_cont to stabilize scap as tolerated     Bismark Aldana PT 11/7/2017  11:07 AM

## 2017-11-09 ENCOUNTER — HOSPITAL ENCOUNTER (OUTPATIENT)
Dept: PHYSICAL THERAPY | Age: 32
Discharge: HOME | End: 2017-11-09
Payer: COMMERCIAL

## 2017-11-09 PROCEDURE — 97110 THERAPEUTIC EXERCISES: CPT

## 2017-11-09 PROCEDURE — 97140 MANUAL THERAPY 1/> REGIONS: CPT

## 2017-11-09 PROCEDURE — 97014 ELECTRIC STIMULATION THERAPY: CPT

## 2017-11-09 NOTE — PROGRESS NOTES
PT DAILY TREATMENT NOTE     Patient Name: Holland Singletary  Date:2017  : 1985  [x]  Patient  Verified  Payor: BLUE CROSS / Plan: [facility] PPO / Product Type: PPO /    In time:2:32 Out time:3:45  Total Treatment Time (min):73  Visit #: 6 of      Treatment Area: Acute pain of right shoulder [M25.511]    SUBJECTIVE  Pain Level (0-10 scale): 0  Any medication changes, allergies to medications, adverse drug reactions, diagnosis change, or new procedure performed?: [x] No    [] Yes (see summary sheet for update)  Subjective functional status/changes:   [x] No changes reported    \"I have trouble putting dishes away and other overhead things. I try not to sleep on my R.\"    OBJECTIVE  Modality rationale: increase muscle contraction/control to improve the patients ability to improve scapular stability   Min Type Additional Details   10 [x] Estim: []Att   []Unatt        []TENS instruct                  []IFC  []Premod   [x]NMES                     [x]Other: 5 on 10 off  []w/US   []w/ice   [x]w/heat  Position:seated   Location: R deltoid/LT with active lift    []  Traction: [] Cervical       []Lumbar                       [] Prone          []Supine                       []Intermittent   []Continuous Lbs:  [] before manual  [] after manual    []  Ultrasound: []Continuous   [] Pulsed                           []1MHz   []3MHz Location:  W/cm2:    []  Iontophoresis with dexamethasone         Location: [] Take home patch   [] In clinic    []  Ice     []  heat  []  Ice massage Position:  Location:    []  Vasopneumatic Device Pressure:       [] lo [] med [] hi   Temperature: [] lo [] med [] hi   [x] Skin assessment post-treatment:  [x]intact []redness- no adverse reaction       []redness  adverse reaction:     53 min Therapeutic exercise: See flow sheet.    Rationale: increase strength, improve coordination and increase proprioception to improve the patients ability to reach and lift [place] 10 min Manual Therapy:  R LS, UT and rhomboid TrP R and DTM f/b UT stretching   Rationale: increase tissue extensibility, decrease trigger points and increase postural awareness to perform functional mobility, OH lifting and improve activity  endurance    x min Patient Education: [x] Review HEP   -      Other Objective/Functional Measures:    LTG 2 - AROM flexion 170 deg   Abd: 165  Added supine horiz abd with t-band. Pain Level (0-10 scale) post treatment: 0    ASSESSMENT/Changes in Function:  Discussed lifting techniques and proper computer ergonomics for computer workstation to address cont postural deficits. Progressed TE without adverse effects. Patient will continue to benefit from skilled PT services to modify and progress therapeutic interventions, address functional mobility deficits, address ROM deficits, address strength deficits, analyze and address soft tissue restrictions, analyze and cue movement patterns, analyze and modify body mechanics/ergonomics and assess and modify postural abnormalities to attain remaining goals. [x]  See Plan of Care  []  See progress note/recertification  []  See Discharge Summary         Progress towards goals / Updated goals: · Short Term Goals: To be accomplished in  1  weeks:  1. Pt will be independent and compliant with HEP - Goal progressing - HEP est with no questions. HEP will be modified and progressed as appropriate 11/2/17  · Long Term Goals: To be accomplished in  8-12  treatments:  1. Patient will increase FOTO score to 78/100 for indications of increased functional mobility. 2.  Patient will demo increased AROM flexion to 140 deg for ease with repeated overhead activity  Goal met at 160 deg  11/7/17  3. Patient will demo 4+/5 SA strength without UT compensation for improve scapular stability with arm elevation    4.  Patient will report increased activity tolerance to 15 min with R UE for work duties for progression to [place] [employer]  [x]  Upgrade activities as tolerated     [x]  Continue plan of care  []  Update interventions per flow sheet       []  Discharge due to:_  [x]  Other:_Added scapular clocks to address weakness.    Abdullahi Schwarz V, PTA 11/9/2017  11:07 AM

## 2017-11-14 ENCOUNTER — HOSPITAL ENCOUNTER (OUTPATIENT)
Dept: PHYSICAL THERAPY | Age: 32
Discharge: HOME | End: 2017-11-14
Payer: COMMERCIAL

## 2017-11-14 PROCEDURE — 97110 THERAPEUTIC EXERCISES: CPT | Performed by: PHYSICAL THERAPIST

## 2017-11-14 PROCEDURE — 97140 MANUAL THERAPY 1/> REGIONS: CPT | Performed by: PHYSICAL THERAPIST

## 2017-11-14 PROCEDURE — 97014 ELECTRIC STIMULATION THERAPY: CPT | Performed by: PHYSICAL THERAPIST

## 2017-11-14 NOTE — PROGRESS NOTES
PT DAILY TREATMENT NOTE     Patient Name: Jamie Mccray  Date:2017  : 1985  [x]  Patient  Verified  Payor: BLUE CROSS / Plan: [facility] PPO / Product Type: PPO /    In time: 232  Out time: 342  Total Treatment Time (min): 70  Visit #: 7 of      Treatment Area: Acute pain of right shoulder [M25.511]    SUBJECTIVE  Pain Level (0-10 scale): 0  Any medication changes, allergies to medications, adverse drug reactions, diagnosis change, or new procedure performed?: [x] No    [] Yes (see summary sheet for update)  Subjective functional status/changes:   [x] No changes reported    \" The ROM is better and the strength. I can still tell there is a lot . \"    OBJECTIVE  Modality rationale: increase muscle contraction/control to improve the patients ability to improve scapular stability   Min Type Additional Details   10 [x] Estim: []Att   []Unatt        []TENS instruct                  []IFC  []Premod   [x]NMES                     [x]Other: 5 on 10 off  []w/US   []w/ice   [x]w/heat  Position:seated   Location: R deltoid/LT with active lift    []  Traction: [] Cervical       []Lumbar                       [] Prone          []Supine                       []Intermittent   []Continuous Lbs:  [] before manual  [] after manual    []  Ultrasound: []Continuous   [] Pulsed                           []1MHz   []3MHz Location:  W/cm2:    []  Iontophoresis with dexamethasone         Location: [] Take home patch   [] In clinic    []  Ice     []  heat  []  Ice massage Position:  Location:    []  Vasopneumatic Device Pressure:       [] lo [] med [] hi   Temperature: [] lo [] med [] hi   [x] Skin assessment post-treatment:  [x]intact []redness- no adverse reaction       []redness  adverse reaction:     50 min Therapeutic exercise: See flow sheet.    Rationale: increase strength, improve coordination and increase proprioception to improve the patients ability to reach and lift OH     10 min Manual Therapy:  R LS, UT and rhomboid TrP R and DTM f/b UT stretching   Rationale: increase tissue extensibility, decrease trigger points and increase postural awareness to perform functional mobility, OH lifting and improve activity  endurance    x min Patient Education: [x] Review HEP   -      Other Objective/Functional Measures: DTM to R Pect minor/blayne,, UT DTM/stretching, PROM flexion/scaption, therex: added single arm lifts in Q-ped towards progression to TB D2 lifts, significant UT substit with Ys      Activity tolerance unknown, Able to hold children longer, will try this week for activity tolerance       Pain Level (0-10 scale) post treatment: 0    ASSESSMENT/Changes in Function:  Continue to progress therex for improved scapular stability    Patient will continue to benefit from skilled PT services to modify and progress therapeutic interventions, address functional mobility deficits, address ROM deficits, address strength deficits, analyze and address soft tissue restrictions, analyze and cue movement patterns, analyze and modify body mechanics/ergonomics and assess and modify postural abnormalities to attain remaining goals. [x]  See Plan of Care  []  See progress note/recertification  []  See Discharge Summary         Progress towards goals / Updated goals: · Short Term Goals: To be accomplished in  1  weeks:  1. Pt will be independent and compliant with HEP - Goal progressing - HEP est with no questions. HEP will be modified and progressed as appropriate 11/2/17  · Long Term Goals: To be accomplished in  8-12  treatments:  1. Patient will increase FOTO score to 78/100 for indications of increased functional mobility. 2.  Patient will demo increased AROM flexion to 140 deg for ease with repeated overhead activity  Goal met at 160 deg  11/7/17  3. Patient will demo 4+/5 SA strength without UT compensation for improve scapular stability with arm elevation    4.  Patient will report increased activity tolerance to 15 min with R UE for work duties for progression to PLOF  Patient able to hold children but hasn't done any prolonged tasks with R UE-reports he will try over next few days 11-14-17     PLAN  [x]  Upgrade activities as tolerated     [x]  Continue plan of care  []  Update interventions per flow sheet       []  Discharge due to:_  []  Other:_Added scapular clocks to address weakness.    Karl Retana, PT 11/14/2017  11:07 AM

## 2017-11-16 ENCOUNTER — HOSPITAL ENCOUNTER (OUTPATIENT)
Dept: PHYSICAL THERAPY | Age: 32
Discharge: HOME | End: 2017-11-16
Payer: COMMERCIAL

## 2017-11-16 PROCEDURE — 97014 ELECTRIC STIMULATION THERAPY: CPT

## 2017-11-16 PROCEDURE — 97110 THERAPEUTIC EXERCISES: CPT

## 2017-11-16 PROCEDURE — 97140 MANUAL THERAPY 1/> REGIONS: CPT

## 2017-11-16 NOTE — PROGRESS NOTES
PT DAILY TREATMENT NOTE     Patient Name: Brennon Dunbar  Date:2017  : 1985  [x]  Patient  Verified  Payor: BLUE CROSS / Plan: [facility] PPO / Product Type: PPO /    In time:2:30  Out time:3:48  Total Treatment Time (min): 78  Total Timed Codes (min): 68  1:1 Treatment Time (min): 68   Visit #: 8 of     Treatment Area: Acute pain of right shoulder [M25.511]    SUBJECTIVE  Pain Level (0-10 scale): 0  Any medication changes, allergies to medications, adverse drug reactions, diagnosis change, or new procedure performed?: [x] No    [] Yes (see summary sheet for update)  Subjective functional status/changes:   [] No changes reported  Improvements: mobility, ROM, strength is better, able to hold son longer pds of time, OH movement sustained periods of time, driving  Deficits: not full strength, lacking scapular stability (licha with I's in prone), sleeping on shoulder   Pain: 0/10 ave, max 2/10 (soreness)  75% improvement      OBJECTIVE  Modality rationale: increase muscle contraction/control to improve the patients ability to improve holding kids, sport, ADLs   Min Type Additional Details   10 [x] Estim: []Att   []Unatt        []TENS instruct                  []IFC  []Premod   [x]NMES                     [x]Other:  5 on and 10 off, 2 channels co-contract []w/US   []w/ice   []w/heat  Position seated:  Location: R deltoid/LT and MT with active lift off    []  Traction: [] Cervical       []Lumbar                       [] Prone          []Supine                       []Intermittent   []Continuous Lbs:  [] before manual  [] after manual    []  Ultrasound: []Continuous   [] Pulsed                           []1MHz   []3MHz Location:  W/cm2:    []  Iontophoresis with dexamethasone         Location: [] Take home patch   [] In clinic    []  Ice     []  heat  []  Ice massage Position:  Location:    []  Vasopneumatic Device Pressure:       [] lo [] med [] hi   Temperature: [] lo [] med [] hi [x] Skin assessment post-treatment:  [x]intact []redness- no adverse reaction       []redness  adverse reaction:       58 min Therapeutic Exercise:  [x] See flow sheet : Reassessment and discussion of POC   Rationale: increase ROM, increase strength and improve coordination to improve the patients ability toimproev reaching, sport, ADLs     10 min Manual Therapy:  DTM and TrP release to the rhomboids, UT, LS; PA glides to T/S in prone grade III to V with cavitations throughout    Rationale: decrease pain, increase ROM, increase tissue extensibility and decrease trigger points to improve reaching, OH mobility           x min Patient Education: [x] Review HEP    [] Progressed/Changed HEP based on:   [] positioning   [] body mechanics   [] transfers   [] heat/ice application        Other Objective/Functional Measures:   AROM R GHJ: scaption 160, abd 135, ER at 0: 90, ER scratch WNL, IR T5 with min winging  GHJ MMT: 5/5 flexion, scaption, 4+/5 abduction, 4/5 SA, 4/5 MT, 4-/5 LT, 4/5 Rhomboid, 4/5 lift off (subscap)    FOTO 67/100 (was 62/100 at IE)  Pain Level (0-10 scale) post treatment: 0    ASSESSMENT/Changes in Function: see PN     Patient will continue to benefit from skilled PT services to modify and progress therapeutic interventions, address functional mobility deficits, address ROM deficits, address strength deficits, analyze and address soft tissue restrictions, analyze and cue movement patterns, analyze and modify body mechanics/ergonomics, assess and modify postural abnormalities and instruct in home and community integration to attain remaining goals. []  See Plan of Care  []  See progress note/recertification  []  See Discharge Summary         Progress towards goals / Updated goals: · Short Term Goals: To be accomplished in  1  weeks:  1.  Pt will be independent and compliant with HEP - Goal progressing - HEP est with no questions.   HEP will be modified and progressed as appropriate 11/2/17  · Long Term Goals: To be accomplished in  8-12  treatments:  1.  Patient will increase FOTO score to 78/100 for indications of increased functional mobility. Progressing at 67/100 (11/16/17)   2.  Patient will demo increased AROM flexion to 140 deg for ease with repeated overhead activity  Goal met at 160 deg scaption  11/16/17  3. Patient will demo 4+/5 SA strength without UT compensation for improve scapular stability with arm elevation   Progressing at 4/5 (11/16/17)  4. Patient will report increased activity tolerance to 15 min with R UE for work duties for progression to PLOF  Patient able to hold children for longer periods. Has not resumed physically challenging tasks at his job (11/16/17)    PLAN  []  Upgrade activities as tolerated     []  Continue plan of care  []  Update interventions per flow sheet       []  Discharge due to:_  [x]  Other: change frequency to 1-4x/month.        Joellen Singletary, PT 11/16/2017  1:29 PM

## 2017-11-16 NOTE — PROGRESS NOTES
[address] [facility] AT [address] [place], [place]  Phone: [phone] Fax: [phone]  PROGRESS NOTE  Patient Name: Lisbeth Gonzalez : 1985   Treatment/Medical Diagnosis: Acute pain of right shoulder [M25.511]   Referral Source: Christy Torres, *     Date of Initial Visit: 10/20/17 Attended Visits: 8 Missed Visits: 0     SUMMARY OF TREATMENT   Pt is a 32y.o. year old male who presents with co R shoulder pain, decreased mobility and strength after moving heavy boxes in late 2017. Ther ex including significant focus on scapular strengthening, ROM, flexibility, stabilization; manual therapy including: DTM, TrP release; NMES for mm re-ed; Patient education; HEP, moist heat for relaxation   CURRENT STATUS  Pt is making good progress in therapy. Pain ranges from 0-2/10, ave 0/1, and pt rates 75% improvement. Score on the FOTO has improved minimally from 62 to 67/100. Main deficits is lack of scapular stability with OH movement, which limits child-care, work and housework. Pt with possible long thoracic n palsy. Improvements: mobility, ROM, strength is better, able to hold son longer pds of time, OH movement sustained periods of time, driving  Deficits: not full strength, lacking scapular stability (licha with I's in prone), sleeping on shoulder     AROM R GHJ: scaption 160, abd 135, ER at 0: 90, ER scratch WNL, IR T5 with min winging  GHJ MMT: 5/5 flexion, scaption, 4+/5 abduction, 4/5 SA, 4/5 MT, 4-/5 LT, 4/5 Rhomboid, 4/5 lift off (subscap)  Significant winging in push-up positions but improving in other open chain and QP positions      · Short Term Goals: To be accomplished in  1  weeks:  1.  Pt will be independent and compliant with HEP - Goal progressing - HEP est with no questions.  Levell Stai will be modified and progressed as appropriate 17  · Long Term Goals: To be accomplished in  8-12  treatments:  1.  Patient will increase FOTO score to 78/100 for indications of increased functional mobility. Progressing at 67/100 (11/16/17)   2.  Patient will demo increased AROM flexion to 140 deg for ease with repeated overhead activity  Goal met at 160 deg scaption  11/16/17  3. Patient will demo 4+/5 SA strength without UT compensation for improve scapular stability with arm elevation   Progressing at 4/5 (11/16/17)  4. Patient will report increased activity tolerance to 15 min with R UE for work duties for progression to [employer] able to hold children for longer periods. Has not resumed physically challenging tasks at his job (11/16/17)    New Goals to be achieved in __4__  treatments:  1.   Patient will increase FOTO score to 78/100 for indications of increased functional mobility. 2.  Patient will demo 4+/5 SA strength without UT compensation for improve scapular stability with arm elevation     3. Patient will report increased activity tolerance to 15 min with R UE for work duties for progression to PLOF   4. Pt will be able to perform 10 modified push-ups with good scapular control to demo sufficient strength for work and ADLs      G-Codes: na   RECOMMENDATIONS  Decrease frequency to 1-4x/month and progress HEP each visit. If you have any questions/comments please contact us directly at [phone]. Thank you for allowing us to assist in the care of your patient. Therapist Signature: Tiffani Tan DPT Date: 11/16/2017     Time: 3:45 PM   NOTE TO PHYSICIAN:  PLEASE COMPLETE THE ORDERS BELOW AND FAX TO   [facility]: [phone].   If you are unable to process this request in 24 hours please contact our office: [phone].  ___ I have read the above report and request that my patient continue as recommended.   ___ I have read the above report and request that my patient continue therapy with the following changes/special instructions:_________________________________________________________   ___ I have read the above report and request that my patient be discharged from therapy.      Physician Signature:        Date:       Time:

## 2017-11-21 ENCOUNTER — HOSPITAL ENCOUNTER (OUTPATIENT)
Dept: PHYSICAL THERAPY | Age: 32
Discharge: HOME | End: 2017-11-21
Payer: COMMERCIAL

## 2017-11-21 PROCEDURE — 97014 ELECTRIC STIMULATION THERAPY: CPT | Performed by: PHYSICAL THERAPIST

## 2017-11-21 PROCEDURE — 97110 THERAPEUTIC EXERCISES: CPT | Performed by: PHYSICAL THERAPIST

## 2017-11-21 NOTE — PROGRESS NOTES
PT DAILY TREATMENT NOTE     Patient Name: Collette Sousa  Date:2017  : 1985  [x]  Patient  Verified  Payor: BLUE CROSS / Plan: [facility] PPO / Product Type: PPO /    In time:300   Out time: 403  Total Treatment Time (min): 63  Total Timed Codes (min): 58  1:1 Treatment Time (min):   Visit #: 1 of     Treatment Area: Acute pain of right shoulder [M25.511]    SUBJECTIVE  Pain Level (0-10 scale): 0  Any medication changes, allergies to medications, adverse drug reactions, diagnosis change, or new procedure performed?: [x] No    [] Yes (see summary sheet for update)  Subjective functional status/changes:   [] No changes reported  Slowly but surely it is getting better.       OBJECTIVE  Modality rationale: increase muscle contraction/control to improve the patients ability to improve holding kids, sport, ADLs   Min Type Additional Details   10 [x] Estim: []Att   []Unatt        []TENS instruct                  []IFC  []Premod   [x]NMES                     [x]Other:  5 on and 10 off, 2 channels co-contract []w/US   []w/ice   []w/heat  Position seated:  Location: R deltoid/LT and MT with active lift off    []  Traction: [] Cervical       []Lumbar                       [] Prone          []Supine                       []Intermittent   []Continuous Lbs:  [] before manual  [] after manual    []  Ultrasound: []Continuous   [] Pulsed                           []1MHz   []3MHz Location:  W/cm2:    []  Iontophoresis with dexamethasone         Location: [] Take home patch   [] In clinic    []  Ice     []  heat  []  Ice massage Position:  Location:    []  Vasopneumatic Device Pressure:       [] lo [] med [] hi   Temperature: [] lo [] med [] hi   [x] Skin assessment post-treatment:  [x]intact []redness- no adverse reaction       []redness  adverse reaction:       48/43 min Therapeutic Exercise:  [x] See flow sheet : progressed to Blk TB, and q-ped with YTB   Rationale: increase ROM, increase strength and improve coordination to improve the patients ability toimproev reaching, sport, ADLs     NI min Manual Therapy:  DTM and TrP release to the rhomboids, UT, LS; PA glides to T/S in prone grade III to V with cavitations throughout    Rationale: decrease pain, increase ROM, increase tissue extensibility and decrease trigger points to improve reaching, OH mobility           x min Patient Education: [x] Review HEP    [] Progressed/Changed HEP based on:   [] positioning   [] body mechanics   [] transfers   [] heat/ice application        Other Objective/Functional Measures:  Patient increased reps with good tolerance, added TB to q-ped UE lifts with good tolerance, progressed to blk TB for rows and horizontal ABD       ASSESSMENT/Changes in Function:   Progressing with scapular strengthening, less winging noted on R, Patient reports \" I can actually feel the mm I am working now. \"  Good tolerance to progression today. Manual NI today. Patient will continue to benefit from skilled PT services to modify and progress therapeutic interventions, address functional mobility deficits, address ROM deficits, address strength deficits, analyze and address soft tissue restrictions, analyze and cue movement patterns, analyze and modify body mechanics/ergonomics, assess and modify postural abnormalities and instruct in home and community integration to attain remaining goals. []  See Plan of Care  []  See progress note/recertification  []  See Discharge Summary         Progress towards goals / Updated goals:  New Goals to be achieved in __4__  treatments:  1.   Patient will increase FOTO score to 78/100 for indications of increased functional mobility. 2.  Patient will demo 4+/5 SA strength without UT compensation for improve scapular stability with arm elevation     3. Patient will report increased activity tolerance to 15 min with R UE for work duties for progression to PLOF   4.   Pt will be able to perform 10 modified push-ups with good scapular control to demo sufficient strength for work and ADLs          PLAN  []  Upgrade activities as tolerated     []  Continue plan of care  []  Update interventions per flow sheet       []  Discharge due to:_  []  Other:      Odessa Byrd, PT 11/21/2017  1:29 PM

## 2017-11-28 ENCOUNTER — HOSPITAL ENCOUNTER (OUTPATIENT)
Dept: PHYSICAL THERAPY | Age: 32
Discharge: HOME | End: 2017-11-28
Payer: COMMERCIAL

## 2017-11-28 PROCEDURE — 97110 THERAPEUTIC EXERCISES: CPT | Performed by: PHYSICAL THERAPIST

## 2017-11-28 PROCEDURE — 97014 ELECTRIC STIMULATION THERAPY: CPT | Performed by: PHYSICAL THERAPIST

## 2017-11-28 NOTE — PROGRESS NOTES
PT DAILY TREATMENT NOTE     Patient Name: Ron Avalos  Date:2017  : 1985  [x]  Patient  Verified  Payor: BLUE CROSS / Plan: [facility] PPO / Product Type: PPO /    In time: 300   Out time: 403  Total Treatment Time (min): 63  Total Timed Codes (min): 58  1:1 Treatment Time (min):   Visit #: 2 of     Treatment Area: Acute pain of right shoulder [M25.511]    SUBJECTIVE  Pain Level (0-10 scale): 0  Any medication changes, allergies to medications, adverse drug reactions, diagnosis change, or new procedure performed?: [x] No    [] Yes (see summary sheet for update)  Subjective functional status/changes:   [x] No changes reported      OBJECTIVE  Modality rationale: increase muscle contraction/control to improve the patients ability to improve holding kids, sport, ADLs   Min Type Additional Details   10 [x] Estim: []Att   []Unatt        []TENS instruct                  []IFC  []Premod   [x]NMES                     [x]Other:  5 on and 10 off, 2 channels co-contract []w/US   []w/ice   []w/heat  Position seated:  Location: R deltoid/LT and MT with active lift off    []  Traction: [] Cervical       []Lumbar                       [] Prone          []Supine                       []Intermittent   []Continuous Lbs:  [] before manual  [] after manual    []  Ultrasound: []Continuous   [] Pulsed                           []1MHz   []3MHz Location:  W/cm2:    []  Iontophoresis with dexamethasone         Location: [] Take home patch   [] In clinic    []  Ice     []  heat  []  Ice massage Position:  Location:    []  Vasopneumatic Device Pressure:       [] lo [] med [] hi   Temperature: [] lo [] med [] hi   [x] Skin assessment post-treatment:  [x]intact []redness- no adverse reaction       []redness  adverse reaction:       53/48 min Therapeutic Exercise:  [x] See flow sheet : progressed to Blk TB, and q-ped with YTB   Rationale: increase ROM, increase strength and improve coordination to improve the patients ability toimproev reaching, sport, ADLs     DC min Manual Therapy:  DTM and TrP release to the rhomboids, UT, LS; PA glides to T/S in prone grade III to V with cavitations throughout    Rationale: decrease pain, increase ROM, increase tissue extensibility and decrease trigger points to improve reaching, OH mobility            min Patient Education: [x] Review HEP    [] Progressed/Changed HEP based on:   [] positioning   [] body mechanics   [] transfers   [] heat/ice application        Other Objective/Functional Measures: Added wall slides on foam roll with good form maintained, NV add modified push ups, patient continues with scapular winging noted on R with Full plank. Assess LTG 4 NV. ASSESSMENT/Changes in Function:   Progressing with scapular strengthening, less winging noted on R in standing but continues in UE WB position (q-ped),   Good tolerance to progression today. Manual therapy DC in prep for DC in 2 visits. Patient will continue to benefit from skilled PT services to modify and progress therapeutic interventions, address functional mobility deficits, address ROM deficits, address strength deficits, analyze and address soft tissue restrictions, analyze and cue movement patterns, analyze and modify body mechanics/ergonomics, assess and modify postural abnormalities and instruct in home and community integration to attain remaining goals. []  See Plan of Care  []  See progress note/recertification  []  See Discharge Summary         Progress towards goals / Updated goals:  New Goals to be achieved in __4__  treatments:  1.   Patient will increase FOTO score to 78/100 for indications of increased functional mobility. 2.  Patient will demo 4+/5 SA strength without UT compensation for improve scapular stability with arm elevation     3. Patient will report increased activity tolerance to 15 min with R UE for work duties for progression to PLOF   4.   Pt will be able to perform 10 modified push-ups with good scapular control to demo sufficient strength for work and ADLs  Test NV 11-28-17         PLAN  [x]  Upgrade activities as tolerated     []  Continue plan of care  []  Update interventions per flow sheet       []  Discharge due to:_  []  Other:      Muna Hung, PT 11/28/2017  1:29 PM

## 2017-12-05 ENCOUNTER — HOSPITAL ENCOUNTER (OUTPATIENT)
Dept: PHYSICAL THERAPY | Age: 32
Discharge: HOME | End: 2017-12-05
Payer: COMMERCIAL

## 2017-12-05 PROCEDURE — 97110 THERAPEUTIC EXERCISES: CPT | Performed by: PHYSICAL THERAPIST

## 2017-12-05 NOTE — PROGRESS NOTES
PT DAILY TREATMENT NOTE     Patient Name: Manuel Solano  Date:2017  : 1985  [x]  Patient  Verified  Payor: BLUE CROSS / Plan: [facility] PPO / Product Type: PPO /    In time: 304pm    Out time: 355  Total Treatment Time (min): 51  Total Timed Codes (min): 46  1:1 Treatment Time (min):   Visit #: 3 of     Treatment Area: Acute pain of right shoulder [M25.511]    SUBJECTIVE  Pain Level (0-10 scale): 0  Any medication changes, allergies to medications, adverse drug reactions, diagnosis change, or new procedure performed?: [x] No    [] Yes (see summary sheet for update)  Subjective functional status/changes:   [x] No changes reported      OBJECTIVE  Modality rationale: increase muscle contraction/control to improve the patients ability to improve holding kids, sport, ADLs   Min Type Additional Details   HELD [x] Estim: []Att   []Unatt        []TENS instruct                  []IFC  []Premod   [x]NMES                     [x]Other:  5 on and 10 off, 2 channels co-contract []w/US   []w/ice   []w/heat  Position seated:  Location: R deltoid/LT and MT with active lift off    []  Traction: [] Cervical       []Lumbar                       [] Prone          []Supine                       []Intermittent   []Continuous Lbs:  [] before manual  [] after manual    []  Ultrasound: []Continuous   [] Pulsed                           []1MHz   []3MHz Location:  W/cm2:    []  Iontophoresis with dexamethasone         Location: [] Take home patch   [] In clinic    []  Ice     []  heat  []  Ice massage Position:  Location:    []  Vasopneumatic Device Pressure:       [] lo [] med [] hi   Temperature: [] lo [] med [] hi   [x] Skin assessment post-treatment:  [x]intact []redness- no adverse reaction       []redness  adverse reaction:       51/46 min Therapeutic Exercise:  [x] See flow sheet : progressed to Blk TB, and q-ped with YTB   Rationale: increase ROM, increase strength and improve coordination to improve the patients ability toimproev reaching, sport, ADLs     DC min Manual Therapy:  DTM and TrP release to the rhomboids, UT, LS; PA glides to T/S in prone grade III to V with cavitations throughout    Rationale: decrease pain, increase ROM, increase tissue extensibility and decrease trigger points to improve reaching, OH mobility            min Patient Education: [x] Review HEP    [] Progressed/Changed HEP based on:   [] positioning   [] body mechanics   [] transfers   [] heat/ice application        Other Objective/Functional Measures:   10 x  push up (modified) with decreased ROM at end range: Partially MET LTG 4  Added Aileen SA press today at 12 psi     ASSESSMENT/Changes in Function:    Good tolerance to progression of therex  today. Patient will be DC NV and HEP progression addressed. Patient will continue to benefit from skilled PT services to modify and progress therapeutic interventions, address functional mobility deficits, address ROM deficits, address strength deficits, analyze and address soft tissue restrictions, analyze and cue movement patterns, analyze and modify body mechanics/ergonomics, assess and modify postural abnormalities and instruct in home and community integration to attain remaining goals. []  See Plan of Care  []  See progress note/recertification  []  See Discharge Summary         Progress towards goals / Updated goals:  New Goals to be achieved in __4__  treatments:  1.   Patient will increase FOTO score to 78/100 for indications of increased functional mobility. 2.  Patient will demo 4+/5 SA strength without UT compensation for improve scapular stability with arm elevation     3. Patient will report increased activity tolerance to 15 min with R UE for work duties for progression to PLOF   4.   Pt will be able to perform 10 modified push-ups with good scapular control to demo sufficient strength for work and ADLs Partially MET 12-5--17         PLAN  [x]  Upgrade activities as tolerated     []  Continue plan of care  []  Update interventions per flow sheet       []  Discharge due to:_  []  Other:      Nancy Malcolm, PT 12/5/2017  1:29 PM

## 2017-12-12 ENCOUNTER — HOSPITAL ENCOUNTER (OUTPATIENT)
Dept: PHYSICAL THERAPY | Age: 32
Discharge: HOME | End: 2017-12-12
Payer: COMMERCIAL

## 2017-12-12 PROCEDURE — 97110 THERAPEUTIC EXERCISES: CPT | Performed by: PHYSICAL THERAPIST

## 2017-12-12 NOTE — PROGRESS NOTES
[address] [facility] [address] [facility] AT [address], [place], [place]  Phone: [phone] Fax [phone] SUMMARY  Patient Name: Connor Abbott : 1985   Treatment/Medical Diagnosis: Acute pain of right shoulder [M25.511]   Referral Source: Alen Hunter, *     Date of Initial Visit: 10/20/17 Attended Visits: 12 Missed Visits: 0     SUMMARY OF TREATMENT  Pt is a 32y.o. year old male who presents with co R shoulder pain, decreased mobility and strength after moving heavy boxes in late 2017. Ther ex including significant focus on scapular strengthening, ROM, flexibility, stabilization; manual therapy including: DTM, TrP release; NMES for mm re-ed; Patient education; HEP, moist heat for relaxation     CURRENT STATUS  Subjective: Patient reports 100% for strength, 75% for ROM  improvements in sx since [facility]. Pain levels :0   Objective: Patient able to perform 10 modified push ups with R scapular instability noted, R shoulder scaption 125deg, compared to  L: 150 deg, SA strength 4/5  Improvements: activity endurance improved, patient  reports no fatigue at work., increased sensation/proprioception  in R shoulder blade and SA mm. Deficits: Patient reports he is able to perform all functional activities with only difficulties noted in  Lifting heavier weights [place], plans to go back to [employer] soon, reports plank based Yoga poses are limited. FOTO: increased from 62 to 72 indicating functional improvements. Goal status:  1.   Patient will increase FOTO score to 78/100 for indications of increased functional mobility. 17 progressing 72   2. Patient will demo 4+/5 SA strength without UT compensation for improve scapular stability with arm elevation  4/5 SA strength  17   3.   Patient will report increased activity tolerance to 15 min with R UE for work duties for progression to [facility] 17 MET Patient reports full activity tolerance with no deficits. 4.  Pt will be able to perform 10 modified push-ups with good scapular control to demo sufficient strength for work and ADLs Partially MET 12-5--17       RECOMMENDATIONS  Discontinue therapy. Progressing towards or have reached established goals. Gcode: If you have any questions/comments please contact us directly at [phone]. Thank you for allowing us to assist in the care of your patient.   Therapist Signature: Maryellen Luna PT Date: 12/12/17   Reporting Period: 10/20 to 12/12/17 Time: 9:53 AM

## 2017-12-12 NOTE — PROGRESS NOTES
PT DAILY TREATMENT NOTE     Patient Name: Kelsey Carson  Date:2017  : 1985  [x]  Patient  Verified  Payor: BLUE CROSS / Plan: [facility] PPO / Product Type: PPO /    In time: 304 pm    Out time: 340  Total Treatment Time (min): 36  Total Timed Codes (min): 31  1:1 Treatment Time (min):   Visit #: 4 of     Treatment Area: Acute pain of right shoulder [M25.511]    SUBJECTIVE  Pain Level (0-10 scale): 0  Any medication changes, allergies to medications, adverse drug reactions, diagnosis change, or new procedure performed?: [x] No    [] Yes (see summary sheet for update)  Subjective functional status/changes:   [x] No changes reported  See DC    OBJECTIVE  Modality rationale: increase muscle contraction/control to improve the patients ability to improve holding kids, sport, ADLs   Min Type Additional Details    [] Estim: []Att   []Unatt        []TENS instruct                  []IFC  []Premod   [x]NMES                     [x]Other:  5 on and 10 off, 2 channels co-contract []w/US   []w/ice   []w/heat  Position seated:  Location: R deltoid/LT and MT with active lift off    []  Traction: [] Cervical       []Lumbar                       [] Prone          []Supine                       []Intermittent   []Continuous Lbs:  [] before manual  [] after manual    []  Ultrasound: []Continuous   [] Pulsed                           []1MHz   []3MHz Location:  W/cm2:    []  Iontophoresis with dexamethasone         Location: [] Take home patch   [] In clinic    []  Ice     []  heat  []  Ice massage Position:  Location:    []  Vasopneumatic Device Pressure:       [] lo [] med [] hi   Temperature: [] lo [] med [] hi   [x] Skin assessment post-treatment:  [x]intact []redness- no adverse reaction       []redness  adverse reaction:       36 min Therapeutic Exercise:  [x] See flow sheet : reassessment and  HEP updated for DC   Rationale: increase ROM, increase strength and improve coordination to improve the patients ability toimproev reaching, sport, ADLs     DC min Manual Therapy:  DTM and TrP release to the rhomboids, UT, LS; PA glides to T/S in prone grade III to V with cavitations throughout    Rationale: decrease pain, increase ROM, increase tissue extensibility and decrease trigger points to improve reaching, OH mobility            min Patient Education: [x] Review HEP    [] Progressed/Changed HEP based on:   [] positioning   [] body mechanics   [] transfers   [] heat/ice application        Other Objective/Functional Measures:     Subjective: Patient reports 100% for strength, 75% for ROM  improvements in sx since [facility]. Pain levels :0   Objective: Patient able to perform 10 modified push ups with R scapular instability noted, R shoulder scaption 125deg, compared to  L: 150 deg, SA strength 4/5  Improvements: activity endurance improved, patient  reports no fatigue at work., increased sensation/proprioception  in R shoulder blade and SA mm. Deficits: Patient reports he is able to perform all functional activities with only difficulties noted in  Lifting heavier weights [place], plans to go back to [employer] soon, reports plank based Yoga poses are limited. FOTO: increased from 62 to 72 indicating functional improvements. ASSESSMENT/Changes in Function:   See DC    Patient will continue to benefit from skilled PT services to modify and progress therapeutic interventions, address functional mobility deficits, address ROM deficits, address strength deficits, analyze and address soft tissue restrictions, analyze and cue movement patterns, analyze and modify body mechanics/ergonomics, assess and modify postural abnormalities and instruct in home and community integration to attain remaining goals.      []  See Plan of Care  []  See progress note/recertification  []  See Discharge Summary         Progress towards goals / Updated goals:  New Goals to be achieved in __4__  treatments:  1.   Patient will increase FOTO score to 78/100 for indications of increased functional mobility. 12/12/17 progressing 72   2. Patient will demo 4+/5 SA strength without UT compensation for improve scapular stability with arm elevation  4/5 SA strength  12/12/17   3. Patient will report increased activity tolerance to 15 min with R UE for work duties for progression to [facility] 12/12/17 MET Patient reports full activity tolerance with no deficits.     4.  Pt will be able to perform 10 modified push-ups with good scapular control to demo sufficient strength for work and ADLs Partially MET 12-5--17         PLAN  []  Upgrade activities as tolerated     []  Continue plan of care  []  Update interventions per flow sheet       [x]  Discharge due to:_goals met or progressing   []  Other:      Sarai Angeles, PT 12/12/2017  1:29 PM

## 2018-04-10 ENCOUNTER — OFFICE VISIT (OUTPATIENT)
Dept: INTERNAL MEDICINE CLINIC | Age: 33
End: 2018-04-10

## 2018-04-10 VITALS
RESPIRATION RATE: 16 BRPM | BODY MASS INDEX: 24.5 KG/M2 | HEART RATE: 85 BPM | TEMPERATURE: 98.4 F | SYSTOLIC BLOOD PRESSURE: 111 MMHG | OXYGEN SATURATION: 97 % | DIASTOLIC BLOOD PRESSURE: 76 MMHG | HEIGHT: 71 IN | WEIGHT: 175 LBS

## 2018-04-10 DIAGNOSIS — L08.9 INFECTION OF THUMB: Primary | ICD-10-CM

## 2018-04-10 RX ORDER — LORATADINE 10 MG/1
10 TABLET ORAL
COMMUNITY
End: 2018-12-01

## 2018-04-10 RX ORDER — CEPHALEXIN 500 MG/1
500 CAPSULE ORAL 4 TIMES DAILY
Qty: 28 CAP | Refills: 0 | Status: SHIPPED | OUTPATIENT
Start: 2018-04-10 | End: 2018-04-17

## 2018-04-10 NOTE — MR AVS SNAPSHOT
[facility] 
 
 
 [address] [facility] 83 [address] 
[phone] Patient: Kwame Larios MRN: [id number] Visit Information Date & Time Provider Department Dept. Phone Encounter #  
 4/10/2018  9:45 AM Breanna Branch, [facility] [phone] [id number] Upcoming Health Maintenance Date Due Pneumococcal 19-64 Medium Risk (1 of 1 - PPSV23) 12/30/2004 DTaP/Tdap/Td series (1 - Tdap) 12/30/2006 Allergies as of 4/10/2018  Review Complete On: 4/10/2018 By: Breanna Branch MD  
  
 Severity Noted Reaction Type Reactions Sulfa (Sulfonamide Antibiotics)  10/10/2017    Hives Current Immunizations  Reviewed on 4/2/2018 Name Date Influenza Vaccine (Quad) PF 10/10/2017 Not reviewed this visit You Were Diagnosed With   
  
 Codes Comments Infection of thumb    -  Primary ICD-10-CM: L08.9 ICD-9-CM: 582. 9 Vitals BP Pulse Temp Resp Height(growth percentile) Weight(growth percentile) 111/76 85 98.4 °F (36.9 °C) (Oral) 16 5' 11\" (1.803 m) 175 lb (79.4 kg) SpO2 BMI Smoking Status 97% 24.41 kg/m2 Never Smoker Vitals History BMI and BSA Data Body Mass Index Body Surface Area  
 24.41 kg/m 2 1.99 m 2 Preferred Pharmacy Pharmacy Name Phone [id number], [address] [facility] [phone] Your Updated Medication List  
  
   
This list is accurate as of 4/10/18 10:35 AM.  Always use your most recent med list.  
  
  
  
  
 albuterol 90 mcg/actuation inhaler Commonly known as:  PROVENTIL HFA, VENTOLIN HFA, PROAIR HFA Take 2 Puffs by inhalation every four (4) hours as needed for Wheezing. cephALEXin 500 mg capsule Commonly known as:  Marrion Silvan Take 1 Cap by mouth four (4) times daily for 7 days. inhalational spacing device 1 Each by Does Not Apply route as needed. loratadine 10 mg tablet Commonly known as:  Keely Gardiner Take 10 mg by mouth.  
  
 multivitamin tablet Commonly known as:  ONE A DAY Take 1 Tab by mouth daily. Prescriptions Sent to Pharmacy Refills  
 cephALEXin (KEFLEX) 500 mg capsule 0 Sig: Take 1 Cap by mouth four (4) times daily for 7 days. Class: Normal  
 Pharmacy: [address] [place] [address], [address] [place] [address] #: [phone] Route: Oral  
  
Introducing [facility] & HEALTH SERVICES! [employer] introduces Cabara patient portal. Now you can access parts of your medical record, email your doctor's office, and request medication refills online. 1. In your internet browser, go to [email or web address] 2. Click on the First Time User? Click Here link in the Sign In box. You will see the New Member Sign Up page. 3. Enter your Cabara Access Code exactly as it appears below. You will not need to use this code after youve completed the sign-up process. If you do not sign up before the expiration date, you must request a new code. · Cabara Access Code: [email or web address] Expires: 7/9/2018 10:28 AM 
 
4. Enter the last four digits of your Social Security Number (xxxx) and Date of Birth (mm/dd/yyyy) as indicated and click Submit. You will be taken to the next sign-up page. 5. Create a Cabara ID. This will be your Cabara login ID and cannot be changed, so think of one that is secure and easy to remember. 6. Create a Cabara password. You can change your password at any time. 7. Enter your Password Reset Question and Answer. This can be used at a later time if you forget your password. 8. Enter your e-mail address. You will receive e-mail notification when new information is available in [address] 9. Click Sign Up. You can now view and download portions of your medical record. 10. Click the Download Summary menu link to download a portable copy of your medical information. If you have questions, please visit the Frequently Asked Questions section of the [employer] website. Remember, [employer] is NOT to be used for urgent needs. For medical emergencies, dial 911. Now available from your iPhone and Android! Please provide this summary of care documentation to your next provider. Your primary care clinician is listed as Laura Gutiérrez. If you have any questions after today's visit, please call [phone].

## 2018-04-10 NOTE — PROGRESS NOTES
Identified pt with two pt identifiers(name and ). Reviewed record in preparation for visit and have obtained necessary documentation. Chief Complaint   Patient presents with    Nail Problem     (Rm #9) pt c/o possible infected nail on left thumb from a [facility]      Health Maintenance Due   Topic    Pneumococcal 19-64 Medium Risk (1 of 1 - PPSV23)    DTaP/Tdap/Td series (1 - Tdap)   -pt states he thinks he is UTD on Tdap (possibly done 2yrs ago) and will check his records    Coordination of Care Questionnaire:  :   1) Have you been to an emergency room, urgent care clinic since your last visit? no   Hospitalized since your last visit? no             2. Have seen or consulted any other health care provider since your last visit? NO  If yes, where when, and reason for visit? Patient is accompanied by self I have received verbal consent from Lilia Page to discuss any/all medical information while they are present in the room.

## 2018-04-10 NOTE — PROGRESS NOTES
FAMILY MEDICINE CLINIC NOTE    S: Infection along the ulnar aspect of the left thumbnail edge that has been present and not resolving for the last 2 weeks since the patient pulled out a hangnail. Current Outpatient Prescriptions on File Prior to Visit   Medication Sig Dispense Refill    multivitamin (ONE A DAY) tablet Take 1 Tab by mouth daily.  albuterol (PROVENTIL HFA, VENTOLIN HFA, PROAIR HFA) 90 mcg/actuation inhaler Take 2 Puffs by inhalation every four (4) hours as needed for Wheezing. 1 Inhaler 6    inhalational spacing device 1 Each by Does Not Apply route as needed. 1 Device 0     No current facility-administered medications on file prior to visit. Past Medical History:   Diagnosis Date    Asthma     Eczema        Social History     Social History    Marital status:      Spouse name: N/A    Number of children: N/A    Years of education: N/A     Occupational History    Not on file. Social History Main Topics    Smoking status: Never Smoker    Smokeless tobacco: Never Used    Alcohol use 4.8 oz/week     2 Glasses of wine, 4 Cans of beer, 2 Shots of liquor per week    Drug use: No    Sexual activity: Yes     Birth control/ protection: None     Other Topics Concern    Not on file     Social History Narrative       Family History   Problem Relation Age of Onset    Thyroid Disease Mother     Osteoporosis Mother     No Known Problems Father        O:  Visit Vitals    /76    Pulse 85    Temp 98.4 °F (36.9 °C) (Oral)    Resp 16    Ht 5' 11\" (1.803 m)    Wt 175 lb (79.4 kg)    SpO2 97%    BMI 24.41 kg/m2     NAD, comfortable  Swelling and erythema at the unar lateral edge of the left thumbnail, significant TTP  No erythema creaping up the thumb or hand     28 y.o. male      ICD-10-CM ICD-9-CM    1.  Infection of thumb L08.9 686.9 cephALEXin (KEFLEX) 500 mg capsule  Warm water soaks BID-TID  Follow up in 2 days for I&D if unimproved

## 2018-04-12 ENCOUNTER — OFFICE VISIT (OUTPATIENT)
Dept: INTERNAL MEDICINE CLINIC | Age: 33
End: 2018-04-12

## 2018-04-12 VITALS
OXYGEN SATURATION: 94 % | DIASTOLIC BLOOD PRESSURE: 82 MMHG | RESPIRATION RATE: 14 BRPM | SYSTOLIC BLOOD PRESSURE: 120 MMHG | TEMPERATURE: 98 F | WEIGHT: 175 LBS | HEIGHT: 71 IN | HEART RATE: 62 BPM | BODY MASS INDEX: 24.5 KG/M2

## 2018-04-12 DIAGNOSIS — L08.9 INFECTION OF THUMB: Primary | ICD-10-CM

## 2018-04-12 NOTE — MR AVS SNAPSHOT
[address] [place] [address] 
 
 
 [address] [facility] [address] 
[phone] Patient: Karla Pyle MRN: [id number] Visit Information Date & Time Provider Department Dept. Phone Encounter #  
 4/12/2018  5:15 PM [address] [phone] [id number] Upcoming Health Maintenance Date Due Pneumococcal 19-64 Medium Risk (1 of 1 - PPSV23) 12/30/2004 DTaP/Tdap/Td series (1 - Tdap) 12/30/2006 Allergies as of 4/12/2018  Review Complete On: 4/12/2018 By: Marie Hernandez LPN Severity Noted Reaction Type Reactions Sulfa (Sulfonamide Antibiotics)  10/10/2017    Hives Current Immunizations  Reviewed on 4/2/2018 Name Date Influenza Vaccine (Quad) PF 10/10/2017 Not reviewed this visit You Were Diagnosed With   
  
 Codes Comments Infection of thumb    -  Primary ICD-10-CM: L08.9 ICD-9-CM: 004. 9 Vitals BP Pulse Temp Resp Height(growth percentile) Weight(growth percentile) 120/82 62 98 °F (36.7 °C) (Oral) 14 5' 11\" (1.803 m) 175 lb (79.4 kg) SpO2 BMI Smoking Status 94% 24.41 kg/m2 Never Smoker BMI and BSA Data Body Mass Index Body Surface Area  
 24.41 kg/m 2 1.99 m 2 Preferred Pharmacy Pharmacy Name Phone [id number] Bobby Gay [phone] Your Updated Medication List  
  
   
This list is accurate as of 4/12/18  5:30 PM.  Always use your most recent med list.  
  
  
  
  
 albuterol 90 mcg/actuation inhaler Commonly known as:  PROVENTIL HFA, VENTOLIN HFA, PROAIR HFA Take 2 Puffs by inhalation every four (4) hours as needed for Wheezing. cephALEXin 500 mg capsule Commonly known as:  Alessandra Crigler Take 1 Cap by mouth four (4) times daily for 7 days. inhalational spacing device 1 Each by Does Not Apply route as needed. loratadine 10 mg tablet Commonly known as:  Hawa Badder Take 10 mg by mouth.  
  
 multivitamin tablet Commonly known as:  ONE A DAY Take 1 Tab by mouth daily. Introducing [facility] & HEALTH SERVICES! Agustin Cardenas introduces Hyperoptic patient portal. Now you can access parts of your medical record, email your doctor's office, and request medication refills online. 1. In your internet browser, go to [email or web address] 2. Click on the First Time User? Click Here link in the Sign In box. You will see the New Member Sign Up page. 3. Enter your Hyperoptic Access Code exactly as it appears below. You will not need to use this code after youve completed the sign-up process. If you do not sign up before the expiration date, you must request a new code. · Hyperoptic Access Code: [email or web address] Expires: 7/9/2018 10:28 AM 
 
4. Enter the last four digits of your Social Security Number (xxxx) and Date of Birth (mm/dd/yyyy) as indicated and click Submit. You will be taken to the next sign-up page. 5. Create a Hyperoptic ID. This will be your Hyperoptic login ID and cannot be changed, so think of one that is secure and easy to remember. 6. Create a Hyperoptic password. You can change your password at any time. 7. Enter your Password Reset Question and Answer. This can be used at a later time if you forget your password. 8. Enter your e-mail address. You will receive e-mail notification when new information is available in [address] 9. Click Sign Up. You can now view and download portions of your medical record. 10. Click the Download Summary menu link to download a portable copy of your medical information. If you have questions, please visit the Frequently Asked Questions section of the Hyperoptic website. Remember, Hyperoptic is NOT to be used for urgent needs. For medical emergencies, dial 911. Now available from your iPhone and Android! Please provide this summary of care documentation to your next provider. Your primary care clinician is listed as Manuel Reid. If you have any questions after today's visit, please call [phone].

## 2018-04-12 NOTE — PROGRESS NOTES
FAMILY MEDICINE CLINIC NOTE    S: The patient presents for follow up on left thumb infection secondary to a hang nail. He has been adherent with keflex and soaking the thumb in warm water. Swelling and redness have decreased     O:  Visit Vitals    /82    Pulse 62    Temp 98 °F (36.7 °C) (Oral)    Resp 14    Ht 5' 11\" (1.803 m)    Wt 175 lb (79.4 kg)    SpO2 94%    BMI 24.41 kg/m2     NAD, comfortable  No significant swelling or TTP of the left thumb, infection resolving    28 y.o. male      ICD-10-CM ICD-9-CM    1.  Infection of thumb L08.9 686.9 Complete antibiotic course  Follow up PRN

## 2018-04-12 NOTE — PROGRESS NOTES
ROOM # 9    Kathryn Gooden presents today for   Chief Complaint   Patient presents with   [facility] Skin Infection     thumb       Kathryn Gooden preferred language for health care discussion is english/other. Is someone accompanying this pt? no    Is the patient using any DME equipment during OV? no    Depression Screening:  PHQ over the last two weeks 10/10/2017   Little interest or pleasure in doing things Not at all   Feeling down, depressed or hopeless Not at all   Total Score PHQ 2 0       Learning Assessment:  Learning Assessment 10/10/2017   PRIMARY LEARNER Patient   HIGHEST LEVEL OF EDUCATION - PRIMARY LEARNER  > 4 YEARS OF COLLEGE   BARRIERS PRIMARY LEARNER NONE   CO-LEARNER CAREGIVER No   PRIMARY LANGUAGE ENGLISH   LEARNER PREFERENCE PRIMARY DEMONSTRATION   ANSWERED BY patient   RELATIONSHIP SELF       Abuse Screening:  No flowsheet data found. Fall Risk  No flowsheet data found. Health Maintenance reviewed and discussed per provider. Yes  Health Maintenance Due   Topic Date Due    Pneumococcal 19-64 Medium Risk (1 of 1 - PPSV23) 12/30/2004    DTaP/Tdap/Td series (1 - Tdap) 12/30/2006       Please order/place referral if appropriate. Advance Directive:  1. Do you have an advance directive in place? Patient Reply: no    2. If not, would you like material regarding how to put one in place? Patient Reply: no    Coordination of Care:  1. Have you been to the ER, urgent care clinic since your last visit? Hospitalized since your last visit? no    2. Have you seen or consulted any other health care providers outside of the [address] [place] [address] since your last visit? Include any pap smears or colon screening.  no

## 2018-09-20 ENCOUNTER — OFFICE VISIT (OUTPATIENT)
Dept: INTERNAL MEDICINE CLINIC | Age: 33
End: 2018-09-20

## 2018-09-20 VITALS
HEIGHT: 71 IN | SYSTOLIC BLOOD PRESSURE: 114 MMHG | WEIGHT: 175.3 LBS | TEMPERATURE: 98.3 F | OXYGEN SATURATION: 97 % | RESPIRATION RATE: 18 BRPM | DIASTOLIC BLOOD PRESSURE: 76 MMHG | BODY MASS INDEX: 24.54 KG/M2 | HEART RATE: 65 BPM

## 2018-09-20 DIAGNOSIS — H57.89 PERIORBITAL SWELLING: Primary | ICD-10-CM

## 2018-09-20 DIAGNOSIS — L30.9 ECZEMA, UNSPECIFIED TYPE: ICD-10-CM

## 2018-09-20 RX ORDER — TRIAMCINOLONE ACETONIDE 0.25 MG/G
CREAM TOPICAL 2 TIMES DAILY
Qty: 15 G | Refills: 2 | Status: SHIPPED | OUTPATIENT
Start: 2018-09-20 | End: 2022-03-15 | Stop reason: ALTCHOICE

## 2018-09-20 NOTE — PROGRESS NOTES
FAMILY MEDICINE CLINIC NOTE    S: Left eye pressure and periorbital puffiness for the last 2 days, no fever or erythema. Some crusting of the eye with no matting of the eyelashes. He denies fever, headache or visual changes. The patient had a bad flare of bilateral Hand eczema for the last months. He has been utilizing daily moisture and OTC hydrocortisone 2 months ago. Current Outpatient Prescriptions on File Prior to Visit   Medication Sig Dispense Refill    loratadine (CLARITIN) 10 mg tablet Take 10 mg by mouth.  multivitamin (ONE A DAY) tablet Take 1 Tab by mouth daily.  albuterol (PROVENTIL HFA, VENTOLIN HFA, PROAIR HFA) 90 mcg/actuation inhaler Take 2 Puffs by inhalation every four (4) hours as needed for Wheezing. 1 Inhaler 6    inhalational spacing device 1 Each by Does Not Apply route as needed. 1 Device 0     No current facility-administered medications on file prior to visit. Past Medical History:   Diagnosis Date    Asthma     Eczema        Social History     Social History    Marital status:      Spouse name: N/A    Number of children: N/A    Years of education: N/A     Occupational History    Not on file.      Social History Main Topics    Smoking status: Never Smoker    Smokeless tobacco: Never Used    Alcohol use 4.8 oz/week     2 Glasses of wine, 4 Cans of beer, 2 Shots of liquor per week    Drug use: No    Sexual activity: Yes     Birth control/ protection: None     Other Topics Concern    Not on file     Social History Narrative       Family History   Problem Relation Age of Onset    Thyroid Disease Mother     Osteoporosis Mother     No Known Problems Father        O:  Visit Vitals    /76 (BP 1 Location: Right arm, BP Patient Position: Sitting)    Pulse 65    Temp 98.3 °F (36.8 °C) (Oral)    Resp 18    Ht 5' 11\" (1.803 m)    Wt 175 lb 4.8 oz (79.5 kg)    SpO2 97%    BMI 24.45 kg/m2     NAD, comfortable  Small comedone obstructing the inferior puncta of the left nasolacrimal duct  No eye discharge or erythema  No LAD  Interdigital eczema of bilateral hands    28 y.o. male      ICD-10-CM ICD-9-CM    1. Periorbital swelling H57.8 379.92 REFERRAL TO OPHTHALMOLOGY  Warm compress  ER precautions   2.  Eczema, unspecified type L30.9 692.9 triamcinolone acetonide (KENALOG) 0.025 % topical cream

## 2018-09-20 NOTE — PROGRESS NOTES
ROOM # 10    Pedro Devine presents today for   Chief Complaint   Patient presents with    Eye Problem     L eye with pressure and swelling per pt    Hand Problem     eczema       Pedro Devine preferred language for health care discussion is english/other. Is someone accompanying this pt? no    Is the patient using any DME equipment during OV? no    Depression Screening:  PHQ over the last two weeks 10/10/2017   Little interest or pleasure in doing things Not at all   Feeling down, depressed, irritable, or hopeless Not at all   Total Score PHQ 2 0       Learning Assessment:  Learning Assessment 10/10/2017   PRIMARY LEARNER Patient   HIGHEST LEVEL OF EDUCATION - PRIMARY LEARNER  > 4 YEARS OF COLLEGE   BARRIERS PRIMARY LEARNER NONE   CO-LEARNER CAREGIVER No   PRIMARY LANGUAGE ENGLISH   LEARNER PREFERENCE PRIMARY DEMONSTRATION   ANSWERED BY patient   RELATIONSHIP SELF         Health Maintenance reviewed and discussed per provider. Yes    Pedro Devine is due for   Health Maintenance Due   Topic Date Due    Pneumococcal 19-64 Medium Risk (1 of 1 - PPSV23) 12/30/2004    DTaP/Tdap/Td series (1 - Tdap) 12/30/2006    Influenza Age 9 to Adult  08/01/2018     Please order/place referral if appropriate. Advance Directive:  1. Do you have an advance directive in place? Patient Reply: no    2. If not, would you like material regarding how to put one in place? Patient Reply: no    Coordination of Care:  1. Have you been to the ER, urgent care clinic since your last visit? Hospitalized since your last visit? no    2. Have you seen or consulted any other health care providers outside of the [address] [place] [address] since your last visit? Include any pap smears or colon screening.  no

## 2018-09-20 NOTE — MR AVS SNAPSHOT
[facility] 
 
 
 [address] [facility] 83 [address] 
[phone] Patient: Collette Sousa MRN: [id number] Visit Information Date & Time Provider Department Dept. Phone Encounter #  
 9/20/2018 10:15 AM Skye Nelson [employer] [phone] [id number] Upcoming Health Maintenance Date Due Pneumococcal 19-64 Medium Risk (1 of 1 - PPSV23) 12/30/2004 DTaP/Tdap/Td series (1 - Tdap) 12/30/2006 Influenza Age 5 to Adult 8/1/2018 Allergies as of 9/20/2018  Review Complete On: 9/20/2018 By: Skye Damon MD  
  
 Severity Noted Reaction Type Reactions Sulfa (Sulfonamide Antibiotics)  10/10/2017    Hives Current Immunizations  Reviewed on 4/2/2018 Name Date Influenza Vaccine (Quad) PF 10/10/2017 Not reviewed this visit You Were Diagnosed With   
  
 Codes Comments Periorbital swelling    -  Primary ICD-10-CM: H57.8 ICD-9-CM: 379.92 Eczema, unspecified type     ICD-10-CM: L30.9 ICD-9-CM: 692.9 Vitals BP Pulse Temp Resp Height(growth percentile) Weight(growth percentile) 114/76 (BP 1 Location: Right arm, BP Patient Position: Sitting) 65 98.3 °F (36.8 °C) (Oral) 18 5' 11\" (1.803 m) 175 lb 4.8 oz (79.5 kg) SpO2 BMI Smoking Status 97% 24.45 kg/m2 Never Smoker Vitals History BMI and BSA Data Body Mass Index Body Surface Area  
 24.45 kg/m 2 2 m 2 Preferred Pharmacy Pharmacy Name Phone [id number], [address] [place] [address] Dona Millan [phone] Your Updated Medication List  
  
   
This list is accurate as of 9/20/18 10:46 AM.  Always use your most recent med list.  
  
  
  
  
 albuterol 90 mcg/actuation inhaler Commonly known as:  PROVENTIL HFA, VENTOLIN HFA, PROAIR HFA Take 2 Puffs by inhalation every four (4) hours as needed for Wheezing. inhalational spacing device 1 Each by Does Not Apply route as needed. loratadine 10 mg tablet Commonly known as:  Denny Sor Take 10 mg by mouth.  
  
 multivitamin tablet Commonly known as:  ONE A DAY Take 1 Tab by mouth daily. triamcinolone acetonide 0.025 % topical cream  
Commonly known as:  KENALOG Apply  to affected area two (2) times a day. use thin layer Prescriptions Sent to Pharmacy Refills  
 triamcinolone acetonide (KENALOG) 0.025 % topical cream 2 Sig: Apply  to affected area two (2) times a day. use thin layer Class: Normal  
 Pharmacy: [address] [place] [address], [address] [place] [address] #: [phone] Route: Topical  
  
We Performed the Following REFERRAL TO OPHTHALMOLOGY [REF57 Custom] Comments:  
 Left sided infraorbital swelling, possible nasolacrimal duct obstruction in adult. Referral Information Referral ID Referred By Referred To  
  
 [id number] Yu BARRON Not Available Visits Status Start Date End Date 1 New Request 9/20/18 9/20/19 If your referral has a status of pending review or denied, additional information will be sent to support the outcome of this decision. Introducing [facility] & HEALTH SERVICES! Laury Verma introduces beqom patient portal. Now you can access parts of your medical record, email your doctor's office, and request medication refills online. 1. In your internet browser, go to [email or web address] 2. Click on the First Time User? Click Here link in the Sign In box. You will see the New Member Sign Up page. 3. Enter your beqom Access Code exactly as it appears below. You will not need to use this code after youve completed the sign-up process. If you do not sign up before the expiration date, you must request a new code. · beqom Access Code: [email or web address] Expires: 12/19/2018 10:45 AM 
 
4.  Enter the last four digits of your Social Security Number (xxxx) and Date of Birth (mm/dd/yyyy) as indicated and click Submit. You will be taken to the next sign-up page. 5. Create a [employer] ID. This will be your [employer] login ID and cannot be changed, so think of one that is secure and easy to remember. 6. Create a [employer] password. You can change your password at any time. 7. Enter your Password Reset Question and Answer. This can be used at a later time if you forget your password. 8. Enter your e-mail address. You will receive e-mail notification when new information is available in [address] 9. Click Sign Up. You can now view and download portions of your medical record. 10. Click the Download Summary menu link to download a portable copy of your medical information. If you have questions, please visit the Frequently Asked Questions section of the [employer] website. Remember, [employer] is NOT to be used for urgent needs. For medical emergencies, dial 911. Now available from your iPhone and Android! Please provide this summary of care documentation to your next provider. Your primary care clinician is listed as Thea Hatch. If you have any questions after today's visit, please call [phone].

## 2018-12-01 ENCOUNTER — HOSPITAL ENCOUNTER (EMERGENCY)
Age: 33
Discharge: HOME | End: 2018-12-01
Attending: EMERGENCY MEDICINE
Payer: COMMERCIAL

## 2018-12-01 ENCOUNTER — APPOINTMENT (OUTPATIENT)
Dept: GENERAL RADIOLOGY | Age: 33
End: 2018-12-01
Attending: PHYSICIAN ASSISTANT
Payer: COMMERCIAL

## 2018-12-01 VITALS
TEMPERATURE: 97.8 F | SYSTOLIC BLOOD PRESSURE: 126 MMHG | OXYGEN SATURATION: 99 % | HEART RATE: 74 BPM | BODY MASS INDEX: 25.05 KG/M2 | WEIGHT: 175 LBS | DIASTOLIC BLOOD PRESSURE: 85 MMHG | RESPIRATION RATE: 16 BRPM | HEIGHT: 70 IN

## 2018-12-01 DIAGNOSIS — M70.41 PREPATELLAR BURSITIS OF RIGHT KNEE: Primary | ICD-10-CM

## 2018-12-01 DIAGNOSIS — M25.561 ACUTE PAIN OF RIGHT KNEE: ICD-10-CM

## 2018-12-01 LAB
ANION GAP SERPL CALC-SCNC: 5 MMOL/L (ref 3–18)
BASOPHILS # BLD: 0 K/UL (ref 0–0.1)
BASOPHILS NFR BLD: 0 % (ref 0–2)
BUN SERPL-MCNC: 11 MG/DL (ref 7–18)
BUN/CREAT SERPL: 13 (ref 12–20)
CALCIUM SERPL-MCNC: 8.8 MG/DL (ref 8.5–10.1)
CHLORIDE SERPL-SCNC: 105 MMOL/L (ref 100–108)
CO2 SERPL-SCNC: 30 MMOL/L (ref 21–32)
CREAT SERPL-MCNC: 0.82 MG/DL (ref 0.6–1.3)
CRP SERPL-MCNC: 7.8 MG/DL (ref 0–0.3)
DIFFERENTIAL METHOD BLD: ABNORMAL
EOSINOPHIL # BLD: 0.2 K/UL (ref 0–0.4)
EOSINOPHIL NFR BLD: 2 % (ref 0–5)
ERYTHROCYTE [DISTWIDTH] IN BLOOD BY AUTOMATED COUNT: 12.3 % (ref 11.6–14.5)
ERYTHROCYTE [SEDIMENTATION RATE] IN BLOOD: 3 MM/HR (ref 0–15)
GLUCOSE SERPL-MCNC: 108 MG/DL (ref 74–99)
HCT VFR BLD AUTO: 42.8 % (ref 36–48)
HGB BLD-MCNC: 15.4 G/DL (ref 13–16)
LYMPHOCYTES # BLD: 1.5 K/UL (ref 0.9–3.6)
LYMPHOCYTES NFR BLD: 15 % (ref 21–52)
MCH RBC QN AUTO: 30.7 PG (ref 24–34)
MCHC RBC AUTO-ENTMCNC: 36 G/DL (ref 31–37)
MCV RBC AUTO: 85.3 FL (ref 74–97)
MONOCYTES # BLD: 0.8 K/UL (ref 0.05–1.2)
MONOCYTES NFR BLD: 7 % (ref 3–10)
NEUTS SEG # BLD: 7.7 K/UL (ref 1.8–8)
NEUTS SEG NFR BLD: 76 % (ref 40–73)
PLATELET # BLD AUTO: 157 K/UL (ref 135–420)
PMV BLD AUTO: 8.5 FL (ref 9.2–11.8)
POTASSIUM SERPL-SCNC: 4 MMOL/L (ref 3.5–5.5)
RBC # BLD AUTO: 5.02 M/UL (ref 4.7–5.5)
SODIUM SERPL-SCNC: 140 MMOL/L (ref 136–145)
WBC # BLD AUTO: 10.1 K/UL (ref 4.6–13.2)

## 2018-12-01 PROCEDURE — 90715 TDAP VACCINE 7 YRS/> IM: CPT | Performed by: NURSE PRACTITIONER

## 2018-12-01 PROCEDURE — 80048 BASIC METABOLIC PNL TOTAL CA: CPT

## 2018-12-01 PROCEDURE — 74011250636 HC RX REV CODE- 250/636: Performed by: NURSE PRACTITIONER

## 2018-12-01 PROCEDURE — 99283 EMERGENCY DEPT VISIT LOW MDM: CPT

## 2018-12-01 PROCEDURE — 90471 IMMUNIZATION ADMIN: CPT

## 2018-12-01 PROCEDURE — 73564 X-RAY EXAM KNEE 4 OR MORE: CPT

## 2018-12-01 PROCEDURE — 86140 C-REACTIVE PROTEIN: CPT

## 2018-12-01 PROCEDURE — 74011250637 HC RX REV CODE- 250/637: Performed by: NURSE PRACTITIONER

## 2018-12-01 PROCEDURE — 85652 RBC SED RATE AUTOMATED: CPT

## 2018-12-01 PROCEDURE — 85025 COMPLETE CBC W/AUTO DIFF WBC: CPT

## 2018-12-01 RX ORDER — CEPHALEXIN 500 MG/1
CAPSULE ORAL
Qty: 28 CAP | Refills: 0 | Status: SHIPPED | OUTPATIENT
Start: 2018-12-01 | End: 2018-12-18 | Stop reason: ALTCHOICE

## 2018-12-01 RX ORDER — CEPHALEXIN 250 MG/1
1000 CAPSULE ORAL
Status: COMPLETED | OUTPATIENT
Start: 2018-12-01 | End: 2018-12-01

## 2018-12-01 RX ORDER — NAPROXEN 500 MG/1
500 TABLET ORAL 2 TIMES DAILY WITH MEALS
Qty: 20 TAB | Refills: 0 | Status: SHIPPED | OUTPATIENT
Start: 2018-12-01 | End: 2018-12-11

## 2018-12-01 RX ADMIN — CEPHALEXIN 1000 MG: 250 CAPSULE ORAL at 17:39

## 2018-12-01 RX ADMIN — TETANUS TOXOID, REDUCED DIPHTHERIA TOXOID AND ACELLULAR PERTUSSIS VACCINE, ADSORBED 0.5 ML: 5; 2.5; 8; 8; 2.5 SUSPENSION INTRAMUSCULAR at 17:40

## 2018-12-01 NOTE — ED PROVIDER NOTES
Alyssa Schwarz is a 28year old male who presents to the ED with a c/o right knee pain and swelling. Pt states it has been hurting and swollen for the past two days. Last week he was skating, and fell down on the right knee. It hadn't bothered him until yesterday. He denies self medicating Past Medical History:  
Diagnosis Date  Asthma  Eczema Past Surgical History:  
Procedure Laterality Date  HX WISDOM TEETH EXTRACTION Bilateral   
 
   
Family History:  
Problem Relation Age of Onset  Thyroid Disease Mother  Osteoporosis Mother  No Known Problems Father Social History Socioeconomic History  Marital status:  Spouse name: Not on file  Number of children: Not on file  Years of education: Not on file  Highest education level: Not on file Social Needs  Financial resource strain: Not on file  Food insecurity - worry: Not on file  Food insecurity - inability: Not on file  Transportation needs - medical: Not on file  Transportation needs - non-medical: Not on file Occupational History  Not on file Tobacco Use  Smoking status: Never Smoker  Smokeless tobacco: Never Used Substance and Sexual Activity  Alcohol use: Yes Alcohol/week: 4.8 oz Types: 2 Glasses of wine, 4 Cans of beer, 2 Shots of liquor per week  Drug use: No  
 Sexual activity: Yes Birth control/protection: None Other Topics Concern  Not on file Social History Narrative  Not on file ALLERGIES: Sulfa (sulfonamide antibiotics) Review of Systems Constitutional: Negative. HENT: Negative. Eyes: Negative. Respiratory: Negative. Cardiovascular: Negative. Gastrointestinal: Negative. Genitourinary: Negative. Musculoskeletal: Positive for arthralgias (right knee) and joint swelling (Right knee). Skin: Negative. Neurological: Negative. Psychiatric/Behavioral: Negative. Vitals: 12/01/18 1549 BP: 126/83 Pulse: 74 Resp: 16 Temp: 97.7 °F (36.5 °C) SpO2: 99% Weight: 79.4 kg (175 lb) Height: 5' 10\" (1.778 m) Physical Exam  
Constitutional: He appears well-developed and well-nourished. No distress. HENT:  
Head: Normocephalic and atraumatic. Nose: Nose normal.  
Eyes: EOM are normal. Right eye exhibits no discharge. Left eye exhibits no discharge. No scleral icterus. Neck: Normal range of motion. Neck supple. No tracheal deviation present. Cardiovascular: Normal rate, regular rhythm and intact distal pulses. Pulmonary/Chest: Effort normal and breath sounds normal.  
Abdominal: Soft. He exhibits no distension. Genitourinary:  
Genitourinary Comments: NE 
  
Musculoskeletal: He exhibits no deformity. Full ROM in flexion and extension of the right knee. Distal pulses intact. There is pre patellar edema. There is noted warm temperature variance when coppoared to the opposing knee. Anterior / Posterior Drawers - neg Varus / Valgus - neg Thomas's - neg Neurological: He is alert. Coordination normal.  
Skin: Skin is warm and dry. NE. Psychiatric: He has a normal mood and affect. His behavior is normal.  
Nursing note and vitals reviewed. MDM Number of Diagnoses or Management Options Diagnosis management comments: PROGRESS NOTE:  Plain film reviewed. Nol acute findings. Kindra Joe NP  5:18 PM 
 
MDM:  Will start on ABX prophlactically. NSAIDS for pain and swelling. Kindra Joe NP  5:20 PM 
 
 
 
  
Amount and/or Complexity of Data Reviewed Tests in the radiology section of CPT®: ordered and reviewed Independent visualization of images, tracings, or specimens: yes (Plain film XR.  ) Risk of Complications, Morbidity, and/or Mortality Presenting problems: low Diagnostic procedures: low Management options: low Patient Progress Patient progress: stable Procedures Diagnosis: 1. Prepatellar bursitis of right knee 2. Acute pain of right knee Disposition:   Discharged to Home Follow-up Information Follow up With Specialties Details Why Contact Info Med Garay MD Family [facility] Call on Monday to arrange follow up appointment. [address] [phone] [phone] 
[phone] Medication List  
  
START taking these medications   
cephALEXin 500 mg capsule Commonly known as:  Charla Brake Take two caps PO bid for seven days 
  
naproxen 500 mg tablet Commonly known as:  NAPROSYN Take 1 Tab by mouth two (2) times daily (with meals) for 10 days. ASK your doctor about these medications   
albuterol 90 mcg/actuation inhaler Commonly known as:  PROVENTIL HFA, VENTOLIN HFA, PROAIR HFA Take 2 Puffs by inhalation every four (4) hours as needed for Wheezing. inhalational spacing device 1 Each by Does Not Apply route as needed. triamcinolone acetonide 0.025 % topical cream 
Commonly known as:  KENALOG Apply  to affected area two (2) times a day. use thin layer Where to Get Your Medications Information about where to get these medications is not yet available Ask your nurse or doctor about these medications · cephALEXin 500 mg capsule · naproxen 500 mg tablet

## 2018-12-01 NOTE — ED NOTES
I have not performed an evaluation, but based on chief complaint, I have placed initial orders to help in expediting patients care.      
 
DICKSON Ramírez 3:56 PM

## 2018-12-01 NOTE — ED NOTES
I have reviewed discharge instruction and prescriptions with pt. Pt verbalized understanding and has no further questions at this time. Education taught and patient verbalized understanding of education. Teach back method used. 20g IV removed, catheter tip intact on removal.  Armband removed and shredded per patients request.   
Patients pain 0/10. Belongings are with pt. Patient discharged with self  to home.

## 2018-12-01 NOTE — ED TRIAGE NOTES
\"I have a swollen, red right knee. I was seen at urgent care and they were concerned with infective bursitis and told me to come get checked out. \"

## 2018-12-01 NOTE — ED NOTES
Pt complains of right knee swelling that started yesterday and now moved down his shin. Denies injury or trauma.

## 2018-12-01 NOTE — DISCHARGE INSTRUCTIONS
Bursitis: Care Instructions  Your Care Instructions    A bursa is a small sac of fluid that helps the tissues around a joint slide over one another easily. Injury or overuse of a joint can cause pain, redness, and inflammation in the bursa (bursitis). Bursitis usually gets better if you avoid the activity that caused it. You can help prevent bursitis from coming back by doing stretching and strengthening exercises. You may also need to change the way you do some activities. Follow-up care is a key part of your treatment and safety. Be sure to make and go to all appointments, and call your doctor if you are having problems. It's also a good idea to know your test results and keep a list of the medicines you take. How can you care for yourself at home? · Put ice or a cold pack on the area for 10 to 20 minutes at a time. Try to do this every 1 to 2 hours for the next 3 days (when you are awake) or until the swelling goes down. Put a thin cloth between the ice and your skin. · After the 3 days of using ice, you may use heat on the area. You can use a hot water bottle; a warm, moist towel; or a heating pad set on low. You can also try alternating heat and ice. · Rest the area where you have pain. Stop any activities that cause pain. Switch to activities that do not stress the area. · Take pain medicines exactly as directed. ? If the doctor gave you a prescription medicine for pain, take it as prescribed. ? If you are not taking a prescription pain medicine, ask your doctor if you can take an over-the-counter medicine. ? Do not take two or more pain medicines at the same time unless the doctor told you to. Many pain medicines have acetaminophen, which is Tylenol. Too much acetaminophen (Tylenol) can be harmful. · To prevent stiffness, gently move the joint as much as you can without pain every day. As the pain gets better, keep doing range-of-motion exercises.  Ask your doctor for exercises that will make the muscles around the joint stronger. Do these as directed. · You can slowly return to the activity that caused the pain, but do it with less effort until you can do it without pain or swelling. Be sure to warm up before and stretch after you do the activity. When should you call for help? Call your doctor now or seek immediate medical care if:    · You have new or worse symptoms of infection, such as:  ? Increased pain, swelling, warmth, or redness. ? Red streaks leading from the area. ? Pus draining from the area. ? A fever.    Watch closely for changes in your health, and be sure to contact your doctor if:    · You do not get better as expected. Where can you learn more? Go to http://kady-yuki.info/. Enter O331 in the search box to learn more about \"Bursitis: Care Instructions. \"  Current as of: November 29, 2017  Content Version: 11.8  © 4592-0732 [employer]. Care instructions adapted under license by [employer] (which disclaims liability or warranty for this information). If you have questions about a medical condition or this instruction, always ask your healthcare professional. [place] [address] any warranty or liability for your use of this information. [employer] Activation    Thank you for requesting access to [employer]. Please follow the instructions below to securely access and download your online medical record. [employer] allows you to send messages to your doctor, view your test results, renew your prescriptions, schedule appointments, and more. How Do I Sign Up? 1. In your internet browser, go to [email or web address]  2. Click on the First Time User? Click Here link in the Sign In box. You will be redirect to the New Member Sign Up page. 3. Enter your [employer] Access Code exactly as it appears below. You will not need to use this code after youve completed the sign-up process.  If you do not sign up before the expiration date, you must request a new code. [employer] Access Code: [email or web address]  Expires: 2018  9:45 AM (This is the date your [employer] access code will )    4. Enter the last four digits of your Social Security Number (xxxx) and Date of Birth (mm/dd/yyyy) as indicated and click Submit. You will be taken to the next sign-up page. 5. Create a [employer] ID. This will be your [employer] login ID and cannot be changed, so think of one that is secure and easy to remember. 6. Create a [employer] password. You can change your password at any time. 7. Enter your Password Reset Question and Answer. This can be used at a later time if you forget your password. 8. Enter your e-mail address. You will receive e-mail notification when new information is available in [address] 9. Click Sign Up. You can now view and download portions of your medical record. 10. Click the Download Summary menu link to download a portable copy of your medical information. Additional Information    If you have questions, please visit the Frequently Asked Questions section of the [employer] website at [email or web address] Remember, [employer] is NOT to be used for urgent needs. For medical emergencies, dial 911. Follow up with your primary care provider for further evaluation and treatment as needed. If symptoms worsen, return to the ER at once.

## 2018-12-18 ENCOUNTER — OFFICE VISIT (OUTPATIENT)
Dept: INTERNAL MEDICINE CLINIC | Age: 33
End: 2018-12-18

## 2018-12-18 VITALS
HEART RATE: 63 BPM | SYSTOLIC BLOOD PRESSURE: 122 MMHG | DIASTOLIC BLOOD PRESSURE: 80 MMHG | OXYGEN SATURATION: 96 % | RESPIRATION RATE: 16 BRPM | HEIGHT: 70 IN | WEIGHT: 178 LBS | TEMPERATURE: 97.2 F | BODY MASS INDEX: 25.48 KG/M2

## 2018-12-18 DIAGNOSIS — M70.41 PREPATELLAR BURSITIS OF RIGHT KNEE: Primary | ICD-10-CM

## 2018-12-18 DIAGNOSIS — L30.9 ECZEMA, UNSPECIFIED TYPE: ICD-10-CM

## 2018-12-18 RX ORDER — NAPROXEN SODIUM 220 MG
220 TABLET ORAL 2 TIMES DAILY WITH MEALS
COMMUNITY
End: 2022-03-15 | Stop reason: ALTCHOICE

## 2018-12-18 RX ORDER — PREDNISONE 10 MG/1
TABLET ORAL
Qty: 21 TAB | Refills: 0 | Status: SHIPPED | OUTPATIENT
Start: 2018-12-18 | End: 2022-03-15 | Stop reason: ALTCHOICE

## 2018-12-18 NOTE — PROGRESS NOTES
Chantal Amaral is a 28 y.o. male (: 1985) presenting to address:    Chief Complaint   Patient presents with   Jeremiah Diane     ER Visit 2018       Vitals:    18 1057   BP: 122/80   Pulse: 63   Resp: 16   Temp: 97.2 °F (36.2 °C)   TempSrc: Oral   SpO2: 96%   Weight: 178 lb (80.7 kg)   Height: 5' 10\" (1.778 m)   PainSc:   0 - No pain       Hearing/Vision:   No exam data present    Learning Assessment:     Learning Assessment 10/10/2017   PRIMARY LEARNER Patient   HIGHEST LEVEL OF EDUCATION - PRIMARY LEARNER  > 4 YEARS OF COLLEGE   BARRIERS PRIMARY LEARNER NONE   CO-LEARNER CAREGIVER No   PRIMARY LANGUAGE ENGLISH   LEARNER PREFERENCE PRIMARY DEMONSTRATION   ANSWERED BY patient   RELATIONSHIP SELF     Depression Screening:     PHQ over the last two weeks 2018   Little interest or pleasure in doing things Not at all   Feeling down, depressed, irritable, or hopeless Not at all   Total Score PHQ 2 0     Fall Risk Assessment:   No flowsheet data found. Abuse Screening:   No flowsheet data found. Coordination of Care Questionaire:   1. Have you been to the ER, urgent care clinic since your last visit? Hospitalized since your last visit? YES    2. Have you seen or consulted any other health care providers outside of the [address] [place] [address] since your last visit? Include any pap smears or colon screening. NO    Advanced Directive:   1. Do you have an Advanced Directive? NO    2. Would you like information on Advanced Directives?  YES

## 2018-12-18 NOTE — PROGRESS NOTES
FAMILY MEDICINE CLINIC NOTE    S: The patient presetns for follow up after a recent ER visit for right prepatellar bursitis. He was seen at [facility] on 12/1/18. ER provider report reviewed. He was prescribed naproxen and keflex. He completed these with some improvement initially but the pain and mildly decreased ROM have started top recur. He is utilizing naproxen and ice. He also reports a recent eczema flare on bilateral hands, refractory to triamcinolone. Current Outpatient Medications on File Prior to Visit   Medication Sig Dispense Refill    naproxen sodium (NAPROSYN) 220 mg tablet Take 220 mg by mouth two (2) times daily (with meals).  albuterol (PROVENTIL HFA, VENTOLIN HFA, PROAIR HFA) 90 mcg/actuation inhaler Take 2 Puffs by inhalation every four (4) hours as needed for Wheezing. 1 Inhaler 6    inhalational spacing device 1 Each by Does Not Apply route as needed. 1 Device 0    triamcinolone acetonide (KENALOG) 0.025 % topical cream Apply  to affected area two (2) times a day. use thin layer 15 g 2     No current facility-administered medications on file prior to visit. Past Medical History:   Diagnosis Date    Asthma     Eczema        Social History     Socioeconomic History    Marital status:      Spouse name: Not on file    Number of children: Not on file    Years of education: Not on file    Highest education level: Not on file   Social Needs    Financial resource strain: Not on file    Food insecurity - worry: Not on file    Food insecurity - inability: Not on file    Transportation needs - medical: Not on file   [employer] needs - non-medical: Not on file   Occupational History    Not on file   Tobacco Use    Smoking status: Never Smoker    Smokeless tobacco: Never Used   Substance and Sexual Activity    Alcohol use:  Yes     Alcohol/week: 4.8 oz     Types: 2 Glasses of wine, 4 Cans of beer, 2 Shots of liquor per week    Drug use: No    Sexual activity: Yes Birth control/protection: None   Other Topics Concern    Not on file   Social History Narrative    Not on file       Family History   Problem Relation Age of Onset    Thyroid Disease Mother     Osteoporosis Mother     No Known Problems Father        O:  Visit Vitals  /80 (BP 1 Location: Right arm, BP Patient Position: Sitting)   Pulse 63   Temp 97.2 °F (36.2 °C) (Oral)   Resp 16   Ht 5' 10\" (1.778 m)   Wt 178 lb (80.7 kg)   SpO2 96%   BMI 25.54 kg/m²     NAD, comfortable  Moderate prepatellar soft tissue swelling overlying the right knee  Mild TTP  Normal passive ROM  Eczematous rash, bilateral hands      28 y.o. male      ICD-10-CM ICD-9-CM    1. Prepatellar bursitis of right knee M70.41 726.65 predniSONE (STERAPRED DS) 10 mg dose pack   2.  Eczema, unspecified type L30.9 692.9 predniSONE (STERAPRED DS) 10 mg dose pack

## 2019-02-27 ENCOUNTER — OFFICE VISIT (OUTPATIENT)
Dept: INTERNAL MEDICINE CLINIC | Age: 34
End: 2019-02-27

## 2019-02-27 VITALS
RESPIRATION RATE: 17 BRPM | TEMPERATURE: 97.7 F | WEIGHT: 181 LBS | BODY MASS INDEX: 25.91 KG/M2 | OXYGEN SATURATION: 95 % | HEART RATE: 62 BPM | HEIGHT: 70 IN | SYSTOLIC BLOOD PRESSURE: 111 MMHG | DIASTOLIC BLOOD PRESSURE: 70 MMHG

## 2019-02-27 DIAGNOSIS — L30.9 ECZEMA, UNSPECIFIED TYPE: ICD-10-CM

## 2019-02-27 DIAGNOSIS — M70.41 BURSITIS, PREPATELLAR, RIGHT: Primary | ICD-10-CM

## 2019-02-27 RX ORDER — KETOROLAC TROMETHAMINE 30 MG/ML
30 INJECTION, SOLUTION INTRAMUSCULAR; INTRAVENOUS ONCE
Qty: 1 VIAL | Refills: 0
Start: 2019-02-27 | End: 2019-02-27

## 2019-02-27 NOTE — PROGRESS NOTES
FAMILY MEDICINE CLINIC NOTE    S: The patient presents for follow up on prepatellar bursitis on the right. It had largely resolved and the patient purchased knee pads for use while skating, noted that the pads were hard on the skin side and when he fell on the right knee the swelling recurred. No immediate swelling, he was able to weight bear. He has been utilizing ice and naproxen which has been helpful. The incident occurred approx 2 weeks ago. He is also having problems with eczema on bilateral hands that has been refractory to management. Outbreak recurred after the patient completed a prednisone dose pack. Triamcinolone is no longer effective. He would like a referral to dermatology. Current Outpatient Medications on File Prior to Visit   Medication Sig Dispense Refill    naproxen sodium (NAPROSYN) 220 mg tablet Take 220 mg by mouth two (2) times daily (with meals).  albuterol (PROVENTIL HFA, VENTOLIN HFA, PROAIR HFA) 90 mcg/actuation inhaler Take 2 Puffs by inhalation every four (4) hours as needed for Wheezing. 1 Inhaler 6    inhalational spacing device 1 Each by Does Not Apply route as needed. 1 Device 0    predniSONE (STERAPRED DS) 10 mg dose pack See administration instruction per 10mg dose pack 21 Tab 0    triamcinolone acetonide (KENALOG) 0.025 % topical cream Apply  to affected area two (2) times a day. use thin layer 15 g 2     No current facility-administered medications on file prior to visit.         Past Medical History:   Diagnosis Date    Asthma     Eczema        Social History     Socioeconomic History    Marital status:      Spouse name: Not on file    Number of children: Not on file    Years of education: Not on file    Highest education level: Not on file   Social Needs    Financial resource strain: Not on file    Food insecurity - worry: Not on file    Food insecurity - inability: Not on file    Transportation needs - medical: Not on file   [employer] needs - non-medical: Not on file   Occupational History    Not on file   Tobacco Use    Smoking status: Never Smoker    Smokeless tobacco: Never Used   Substance and Sexual Activity    Alcohol use:  Yes     Alcohol/week: 4.8 oz     Types: 2 Glasses of wine, 4 Cans of beer, 2 Shots of liquor per week    Drug use: No    Sexual activity: Yes     Birth control/protection: None   Other Topics Concern    Not on file   Social History Narrative    Not on file       Family History   Problem Relation Age of Onset    Thyroid Disease Mother     Osteoporosis Mother     No Known Problems Father      O:  Visit Vitals  /70 (BP 1 Location: Right arm, BP Patient Position: Sitting)   Pulse 62   Temp 97.7 °F (36.5 °C) (Oral)   Resp 17   Ht 5' 10\" (1.778 m)   Wt 181 lb (82.1 kg)   SpO2 95%   BMI 25.97 kg/m²     NAD, comfortable  Mild right knee effusion, no warmth or redness  No significant knee TTP  Eczematous rash, bilateral hands    35 y.o. male      ICD-10-CM ICD-9-CM    1. Bursitis, prepatellar, right M70.41 726.65 ketorolac (TORADOL) 30 mg/mL (1 mL) injection      KETOROLAC TROMETHAMINE INJ      IL THER/PROPH/DIAG INJECTION, SUBCUT/IM   2. Eczema, unspecified type L30.9 692.9 REFERRAL TO DERMATOLOGY

## 2019-07-29 DIAGNOSIS — J45.20 MILD INTERMITTENT ASTHMA WITHOUT COMPLICATION: ICD-10-CM

## 2019-08-05 RX ORDER — ALBUTEROL SULFATE 90 UG/1
AEROSOL, METERED RESPIRATORY (INHALATION)
Qty: 1 INHALER | Refills: 0 | Status: SHIPPED | OUTPATIENT
Start: 2019-08-05

## 2022-02-18 ENCOUNTER — APPOINTMENT (OUTPATIENT)
Dept: PHYSICAL THERAPY | Age: 37
End: 2022-02-18

## 2022-02-18 ENCOUNTER — HOSPITAL ENCOUNTER (OUTPATIENT)
Dept: PHYSICAL THERAPY | Age: 37
Discharge: HOME | End: 2022-02-18
Payer: COMMERCIAL

## 2022-02-18 PROCEDURE — 97162 PT EVAL MOD COMPLEX 30 MIN: CPT

## 2022-02-18 PROCEDURE — 97110 THERAPEUTIC EXERCISES: CPT

## 2022-02-18 PROCEDURE — 97140 MANUAL THERAPY 1/> REGIONS: CPT

## 2022-02-18 NOTE — PROGRESS NOTES
PT DAILY TREATMENT NOTE     Patient Name: Jovanni Mathur  Date:2022  : 1985  [x]  Patient  Verified  Payor: BLUE "[employer] / Plan: Ifeoma Johnston 5747 PPO / Product Type: PPO /    In time:8:51  Out time: 9:34  Total Treatment Time (min): 43  Visit #: 1 of     Medicare/BCBS Only   Total Timed Codes (min):  43 1:1 Treatment Time:  40       Treatment Area: Left shoulder pain [M25.512]    SUBJECTIVE  Pain Level (0-10 scale): 4  Any medication changes, allergies to medications, adverse drug reactions, diagnosis change, or new procedure performed?: [x] No    [] Yes (see summary sheet for update)  Subjective functional status/changes:   [] No changes reported  Pt initial eval see POC for details    OBJECTIVE    Modality rationale: decrease pain to improve the patient's ability to tolerate ADLs   Min Type Additional Details    [] Estim:  []Unatt       []IFC  []Premod                        []Other:  []w/ice   []w/heat  Position:  Location:    [] Estim: []Att    []TENS instruct  []NMES                    []Other:  []w/US   []w/ice   []w/heat  Position:  Location:    []  Traction: [] Cervical       []Lumbar                       [] Prone          []Supine                       []Intermittent   []Continuous Lbs:  [] before manual  [] after manual    []  Ultrasound: []Continuous   [] Pulsed                           []1MHz   []3MHz W/cm2:  Location:    []  Iontophoresis with dexamethasone         Location: [] Take home patch   [] In clinic   3 [x]  Ice     []  heat  []  Ice massage  []  Laser   []  Anodyne Position:supine  Location:L shoulder    []  Laser with stim  []  Other:  Position:  Location:    []  Vasopneumatic Device Pressure:       [] lo [] med [] hi   Temperature: [] lo [] med [] hi   [] Skin assessment post-treatment:  []intact []redness- no adverse reaction    []redness  adverse reaction:     17 min [x]Eval                  []Re-Eval       10 min Therapeutic Exercise:  [] See flow sheet :   Rationale: increase ROM and increase strength to improve the patient's ability to reach overhead    13 min Manual Therapy:  Gr II/III inferior glides followed by prom in all planes, trigger point release to upper trap and levator scap   The manual therapy interventions were performed at a separate and distinct time from the therapeutic activities interventions. Rationale: decrease pain, increase ROM and increase tissue extensibility to improve tissue excursion during functional mobility and ADLs              With   [] TE   [] TA   [] neuro   [] other: Patient Education: [x] Review HEP    [] Progressed/Changed HEP based on:   [] positioning   [] body mechanics   [] transfers   [] heat/ice application    [] other:      Other Objective/Functional Measures:    Justification for Eval Code Complexity:  Patient History : see POC   Examination see exam   Clinical Presentation: evolving  Clinical Decision Making : FOTO : 55/100    Pain Level (0-10 scale) post treatment: 4    ASSESSMENT/Changes in Function: Pt was instructed in initial HEP required demo, vc, and tc for all exercises to perform correctly. Pt was given hand out detailing exercises and instructed in modification of exercises to tolerance, and in performing exercises safely. Pt verbalized understanding. Patient will continue to benefit from skilled PT services to modify and progress therapeutic interventions, address functional mobility deficits, address ROM deficits, address strength deficits, analyze and address soft tissue restrictions, analyze and cue movement patterns, analyze and modify body mechanics/ergonomics, assess and modify postural abnormalities, address imbalance/dizziness and instruct in home and community integration to attain remaining goals.      []  See Plan of Care  []  See progress note/recertification  []  See Discharge Summary         Progress towards goals / Updated goals:  No progress as goals were set today    PLAN  []  Upgrade activities as tolerated     []  Continue plan of care  []  Update interventions per flow sheet       []  Discharge due to:_  []  Other:_        Denys Kaufman 2/18/2022  7:16 AM    Future Appointments   Date Time Provider Karishma Ma   2/18/2022  8:45 AM [facility]   3/15/2022  8:45 AM Brittanie Lizama NP [facility]

## 2022-02-18 NOTE — PROGRESS NOTES
[address] PHYSICAL THERAPY AT [facility]   [id number] [place] [address] [address] [facility], [place] [address]  Phone: [phone] Fax: [phone] / 854 [facility] PHYSICAL THERAPY SERVICES  Patient Name: Karly cMcullough : 1985   Medical   Diagnosis: Left shoulder pain [M25.512] Treatment Diagnosis: Left shoulder pain [M25.512]   Onset Date: 22     Referral Source: Maggie Martinez MD [facility]): 2022   Prior Hospitalization: See medical history Provider #: [id number]   Prior Level of Function: I in all functional mobility and ADLs   Comorbidities: Depression, asthma   Medications: Verified on Patient Summary List   The Plan of Care and following information is based on the information from the initial evaluation.   ========================================================================  Assessment / key information:  Pt is a 39 y.o. M who presents with L shoulder pain s/p class 3 AC joint seperation. Current deficits include: dec shoulder ROM, dec shoulder strength, dec scapular dynamic stability. Functional deficits include: impaired overhead reaching, lifting, sleeping on his side, and impaired ability to perform ADLs (commuting via bicycle, dressing, washing hair participating in typical yoga routine) . FOTO score indicates 45% functional impairment. Home exercise program initiated on initial evaluation to address these deficits. Pt would benefit from PT to address these deficits for increased functional mobility and QOL. CELINE: Pt reports that on  he was inline skating when he fell and landed on his L shoulder. He went straight to the ER where they did x rays to rule out a fracture an diagnosed him with a class 3 shoulder separation.  He was put in a sling for 6 weeks, and then two weeks ago he had a follow up at the ortho where they did a  Second set of x rays which he reports showed the ligaments were healing and recommended going to physical therapy. He continues to wear the sling intermittently. He describes the pain as an ache that can become a pinch. At times he has had some radiating pain into the left and right arms, he feels due to the muscles getting tight. The pain is worse with any movement at shoulder height or higher and with adjusting his arms to sleep. At times it feels like the joint is  a little more. It is also painful to reach across to put on a seat belt etc. He has not been able to ride his bicycle which is his primary mode of transport to commute to work. He has not been able to sleep on his side, where as he used to be able to sleep on his side. The muscular pain is better with muscle relaxers, rest, KT tape, and ice. PAIN:  Location- L shoulder  Current- 4  Best-0  Worst-8  Alleviating factors: rest, ice, KT tape  Aggravating factors: overhead activity    OBJECTIVE    AROM: R=WNL  L Shoulder  - Flexion 133 degp!  - Abduction 109 degp!  - IR 57 degp! FIR T12  - ER 65 degp! LOIS to L earlobe    MMT:  Shoulder:  - flexion L=3/5p!  - extension L=3/5 p!  - abduction L=3/5 p!  - IR L=3/5 p!  - ER L=3/5 p!     Outcome Measure: FOTO= 55    Posture: noted rounded shoulders L>R, L shoulder elevated in comparison to R, piano key deformity at L [facility] joint    Special Tests:  Francia Bautista- positive  Neers- positive  Empty Can- positive  ULTT: median nerve positive      Accessory ROM:  Inferior glide- moderately dec capsular mobility  Anterior glide-moderately dec capsular mobility  Posterior glide-minimally dec capsular mobility    Palpation for tenderness: TTP of L upper trap, levator scap, cervical paraspinals, scalenes, distal pectorals, supraspinatus, AC joint, infraspinatus, pronator teres    ========================================================================  Eval Complexity: History: MEDIUM  Complexity : 1-2 comorbidities / personal factors will impact the outcome/ POC Exam:MEDIUM Complexity : 3 Standardized tests and measures addressing body structure, function, activity limitation and / or participation in recreation  Presentation: MEDIUM Complexity : Evolving with changing characteristics  Clinical Decision Making:MEDIUM Complexity : FOTO score of 26-74Overall Complexity:MEDIUM  Problem List: pain affecting function, decrease ROM, decrease strength, edema affecting function, decrease ADL/ functional abilitiies, decrease activity tolerance, decrease flexibility/ joint mobility and decrease transfer abilities   Treatment Plan may include any combination of the following: Therapeutic exercise, Therapeutic activities, Neuromuscular re-education, Physical agent/modality, Manual therapy, Patient education, Self Care training, Functional mobility training, Home safety training and Stair training  Patient / Family readiness to learn indicated by: asking questions  Persons(s) to be included in education: patient (P)  Barriers to Learning/Limitations: None  Measures taken:    Patient Goal (s): \"Full use of arm. Overhead lifting, skating, biking, full activity\"   Patient self reported health status: good  Rehabilitation Potential: good  Short Term Goals: To be accomplished in 1 weeks:  1) Goal: Patient will be independent and compliant with HEP in order to progress toward long term goals. Status at last note/certification: issued and reviewed  Long Term Goals: To be accomplished in 8-12 treatments:  1) Goal: Patient will improve FOTO assessment score to 74 pts in order to indicate improved functional abilities. Status at last note/certification: 55 pts  2) Goal: Patient will improve L shoulder flexion to 170 deg or better for overhead reaching  Status at last note/certification: 855 deg p!  3) Goal: Patient will report worst shoulder pain as 2/10 or less in order to progress toward personal goals.   Status at last note/certification: 9/19  4) Goal: Patient will report overall at least 75% improvement in function in order to progress toward premorbid status. Status at last note/certification: n/a  5) Goal: Patient will demonstrated 1 mm grade strength improvement for overhead lifting  Status at last note/certification: grossly 3/5 p! Frequency / Duration:   Patient to be seen  1-2  times per week for 8-12  treatments:     Patient / Caregiver education and instruction: exercises  Therapist Signature: Farhan Barrios Date: 5/79/2199   Certification Period: na Time: 7:16 AM   ========================================================================  I certify that the above Physical Therapy Services are being furnished while the patient is under my care. I agree with the treatment plan and certify that this therapy is necessary. Physician Signature:        Date:       Time: ___                                            Kaylin Burgess MD.    Please sign and return to [facility] or you may fax the signed copy to [phone]. Thank you.

## 2022-02-23 ENCOUNTER — APPOINTMENT (OUTPATIENT)
Dept: PHYSICAL THERAPY | Age: 37
End: 2022-02-23
Payer: COMMERCIAL

## 2022-02-24 ENCOUNTER — APPOINTMENT (OUTPATIENT)
Dept: PHYSICAL THERAPY | Age: 37
End: 2022-02-24
Payer: COMMERCIAL

## 2022-03-04 ENCOUNTER — HOSPITAL ENCOUNTER (OUTPATIENT)
Dept: PHYSICAL THERAPY | Age: 37
Discharge: HOME | End: 2022-03-04
Payer: COMMERCIAL

## 2022-03-04 PROCEDURE — 97140 MANUAL THERAPY 1/> REGIONS: CPT

## 2022-03-04 PROCEDURE — 97110 THERAPEUTIC EXERCISES: CPT

## 2022-03-04 NOTE — PROGRESS NOTES
PT DAILY TREATMENT NOTE     Patient Name: Jory Rodriguez  Date:3/4/2022  : 1985  [x]  Patient  Verified  Payor: BLUE [employer] System / Plan: Ifeoma Johnston [id number] PPO / Product Type: PPO /    In time: 9:31 Out time:10:30  Total Treatment Time (min): 59  Visit #: 2 of     Medicare/BCBS Only   Total Timed Codes (min):  49 1:1 Treatment Time:  49       Treatment Area: Left shoulder pain [M25.512]    SUBJECTIVE  Pain Level (0-10 scale):  2  Any medication changes, allergies to medications, adverse drug reactions, diagnosis change, or new procedure performed?: [x] No    [] Yes (see summary sheet for update)  Subjective functional status/changes:   [] No changes reported  Pt reports he has been working on his HEP, he has had to modify the reps somewhat as the shoulder will get aggravated if he does too much    OBJECTIVE    Modality rationale: decrease inflammation and decrease pain to improve the patients ability to tolerate overhead activity and ADLs   Min Type Additional Details    [] Estim:  []Unatt       []IFC  []Premod                        []Other:  []w/ice   []w/heat  Position:  Location:    [] Estim: []Att    []TENS instruct  []NMES                    []Other:  []w/US   []w/ice   []w/heat  Position:  Location:    []  Traction: [] Cervical       []Lumbar                       [] Prone          []Supine                       []Intermittent   []Continuous Lbs:  [] before manual  [] after manual    []  Ultrasound: []Continuous   [] Pulsed                           []1MHz   []3MHz W/cm2:  Location:    []  Iontophoresis with dexamethasone         Location: [] Take home patch   [] In clinic   10 [x]  Ice     []  heat  []  Ice massage  []  Laser   []  Anodyne Position:spine   Location:L shoulder    []  Laser with stim  []  Other:  Position:  Location:    []  Vasopneumatic Device    []  Right     []  Left  Pre-treatment girth:  Post-treatment girth:  Measured at (location):  Pressure:       [] lo [] med [] hi   Temperature: [] lo [] med [] hi   [x] Skin assessment post-treatment:  [x]intact []redness- no adverse reaction    []redness  adverse reaction:     34 min Therapeutic Exercise:  [] See flow sheet :   Rationale: increase ROM and increase strength to improve the patients ability to perform overhead activity. 15 min Manual Therapy:  L shoulder: pec manual stretch, pec minor release, Gr III inferior glides with flexion and abduction bias, manual flexion and abduction stretching, trigger point release to L upper trap, levator, stm to supraspinatus , infraspinatus, lateral deltoid, and ac joint. The manual therapy interventions were performed at a separate and distinct time from the therapeutic activities interventions. Rationale: decrease pain and increase ROM to improve pt tolerance for overhead activity            With   [] TE   [] TA   [] neuro   [] other: Patient Education: [x] Review HEP    [] Progressed/Changed HEP based on:   [] positioning   [] body mechanics   [] transfers   [] heat/ice application    [] other:      Other Objective/Functional Measures: Program initiated today- 1st follow up     Pain Level (0-10 scale) post treatment: 2    ASSESSMENT/Changes in Function: Pt required vc and demo for all exercises to perform correctly as today is first follow up. PT and pt verbally reviewed HEP with pt having no questions. Pt HEP was updated with exercises from today to continue progressing functional strength. Pt was instructed to continue to monitor sx, and modify exercises, reps, and sets to tolerance. Pt verbalized understanding. Pt questioned whether he would be able to do yard work, PT recommended utilizing L arm primarily for stabilization of tools, and to keep activity at waist level, avoiding overhead work and quick/uncontrolled movements. Pt verbalized understanding.      Patient will continue to benefit from skilled PT services to modify and progress therapeutic interventions, address functional mobility deficits, address ROM deficits, address strength deficits, analyze and address soft tissue restrictions, analyze and cue movement patterns, analyze and modify body mechanics/ergonomics, assess and modify postural abnormalities, address imbalance/dizziness and instruct in home and community integration to attain remaining goals. []  See Plan of Care  []  See progress note/recertification  []  See Discharge Summary         Progress towards goals / Updated goals:  Short Term Goals: To be accomplished in 1 weeks:  1) Goal: Patient will be independent and compliant with HEP in order to progress toward long term goals. Status at last note/certification: issued and reviewed Pt reports I and compliance with initial HEP 3/4/22  Long Term Goals: To be accomplished in 8-12 treatments:  1) Goal: Patient will improve FOTO assessment score to 74 pts in order to indicate improved functional abilities. Status at last note/certification: 55 pts  2) Goal: Patient will improve L shoulder flexion to 170 deg or better for overhead reaching  Status at last note/certification: 349 deg p!  3) Goal: Patient will report worst shoulder pain as 2/10 or less in order to progress toward personal goals. Status at last note/certification: 8/55  4) Goal: Patient will report overall at least 75% improvement in function in order to progress toward premorbid status. Status at last note/certification: n/a  5) Goal: Patient will demonstrated 1 mm grade strength improvement for overhead lifting  Status at last note/certification: grossly 3/5 p!     PLAN  []  Upgrade activities as tolerated     []  Continue plan of care  []  Update interventions per flow sheet       []  Discharge due to:_  []  Other:_      Yousif Cueva 3/4/2022  7:16 AM    Future Appointments   Date Time Provider Karishma Ma   3/4/2022  9:30 AM Henretta Pastdamian [facility]   3/8/2022  9:15 AM BRADY Castellanos [facility]   3/11/2022  9:30 AM Jack Pastdamian [facility] [address]   3/15/2022  8:45 AM Nilam Smith, NP [facility] AMB   3/16/2022 12:15 PM Indy Barahona PTA [facility] [address]   3/18/2022  8:45 AM Sammi Mane [facility] [address]   3/23/2022 12:15 PM Teresita Tompkins [facility] [address]   3/25/2022  8:45 AM Sammi Mane [facility] [address]   3/30/2022 12:15 PM Indy Barahona PTA [facility] [address]

## 2022-03-08 ENCOUNTER — HOSPITAL ENCOUNTER (OUTPATIENT)
Dept: PHYSICAL THERAPY | Age: 37
Discharge: HOME | End: 2022-03-08
Payer: COMMERCIAL

## 2022-03-08 PROCEDURE — 97140 MANUAL THERAPY 1/> REGIONS: CPT

## 2022-03-08 PROCEDURE — 97110 THERAPEUTIC EXERCISES: CPT

## 2022-03-08 NOTE — PROGRESS NOTES
PT DAILY TREATMENT NOTE     Patient Name: Edson Winn  Date:3/8/2022  : 1985  [x]  Patient  Verified  Payor: BLUE CROSS / Plan: [facility] PPO / Product Type: PPO /    In time: 9:30 Out time:10:15  Total Treatment Time (min): 45  Visit #: 3 of     Medicare/BCBS Only   Total Timed Codes (min):  35 1:1 Treatment Time:  35       Treatment Area: Left shoulder pain [M25.512]    SUBJECTIVE  Pain Level (0-10 scale): 3-4 (muscular)  Any medication changes, allergies to medications, adverse drug reactions, diagnosis change, or new procedure performed?: [x] No    [] Yes (see summary sheet for update)  Subjective functional status/changes:   [] No changes reported  \"I have more muscular pain and tightness now rather than joint. I did a lot of gardening the last couple days which is why I am more tight. \"    15 min late    OBJECTIVE    Modality rationale: decrease inflammation and decrease pain to improve the patients ability to tolerate overhead activity and ADLs   Min Type Additional Details    [] Estim:  []Unatt       []IFC  []Premod                        []Other:  []w/ice   []w/heat  Position:  Location:    [] Estim: []Att    []TENS instruct  []NMES                    []Other:  []w/US   []w/ice   []w/heat  Position:  Location:    []  Traction: [] Cervical       []Lumbar                       [] Prone          []Supine                       []Intermittent   []Continuous Lbs:  [] before manual  [] after manual    []  Ultrasound: []Continuous   [] Pulsed                           []1MHz   []3MHz W/cm2:  Location:    []  Iontophoresis with dexamethasone         Location: [] Take home patch   [] In clinic   10 [x]  Ice     []  heat  []  Ice massage  []  Laser   []  Anodyne Position:spine   Location:L shoulder    []  Laser with stim  []  Other:  Position:  Location:    []  Vasopneumatic Device    []  Right     []  Left  Pre-treatment girth:  Post-treatment girth:  Measured at (location): Pressure:       [] lo [] med [] hi   Temperature: [] lo [] med [] hi   [x] Skin assessment post-treatment:  [x]intact []redness- no adverse reaction    []redness  adverse reaction:     25 min Therapeutic Exercise:  [] See flow sheet :   Rationale: increase ROM and increase strength to improve the patients ability to perform overhead activity. 10 min Manual Therapy:  L shoulder: pec manual stretch, pec minor release, Gr III inferior glides with flexion and abduction bias, manual flexion and abduction stretching, trigger point release to L upper trap, levator, stm to supraspinatus , infraspinatus, lateral deltoid, and ac joint. The manual therapy interventions were performed at a separate and distinct time from the therapeutic activities interventions. Rationale: decrease pain and increase ROM to improve pt tolerance for overhead activity            With   [] TE   [] TA   [] neuro   [] other: Patient Education: [x] Review HEP    [] Progressed/Changed HEP based on:   [] positioning   [] body mechanics   [] transfers   [] heat/ice application    [] other:      Other Objective/Functional Measures:      Pain Level (0-10 scale) post treatment: 2    ASSESSMENT/Changes in Function: Increase in mm tightness in L UT and rhomboids due to gardening and compensatory patterns above 90 deg but improved post MT. No sx exacerbation with today's Tx. Cont to be limited in shoulder movements above 90 deg. Patient will continue to benefit from skilled PT services to modify and progress therapeutic interventions, address functional mobility deficits, address ROM deficits, address strength deficits, analyze and address soft tissue restrictions, analyze and cue movement patterns, analyze and modify body mechanics/ergonomics, assess and modify postural abnormalities, address imbalance/dizziness and instruct in home and community integration to attain remaining goals.      []  See Plan of Care  []  See progress note/recertification  []  See Discharge Summary         Progress towards goals / Updated goals:  Short Term Goals: To be accomplished in 1 weeks:  1) Goal: Patient will be independent and compliant with HEP in order to progress toward long term goals. Status at last note/certification: issued and reviewed Pt reports I and compliance with initial HEP 3/4/22  Long Term Goals: To be accomplished in 8-12 treatments:  1) Goal: Patient will improve FOTO assessment score to 74 pts in order to indicate improved functional abilities. Status at last note/certification: 55 pts  2) Goal: Patient will improve L shoulder flexion to 170 deg or better for overhead reaching  Status at last note/certification: 022 deg p!  3) Goal: Patient will report worst shoulder pain as 2/10 or less in order to progress toward personal goals. Status at last note/certification: 5/37  4) Goal: Patient will report overall at least 75% improvement in function in order to progress toward premorbid status. Status at last note/certification: n/a  5) Goal: Patient will demonstrated 1 mm grade strength improvement for overhead lifting  Status at last note/certification: grossly 3/5 p!     PLAN  [x]  Upgrade activities as tolerated     [x]  Continue plan of care  []  Update interventions per flow sheet       []  Discharge due to:_  []  Other:_      Preston Dupont PTA 3/8/2022  7:16 AM    Future Appointments   Date Time Provider Karishma Ma   3/8/2022  9:15 AM Monae Pena PTA [facility]   3/11/2022  9:30 AM Elaine Lucia [facility]   3/15/2022  8:45 AM YESENIA Lim BS AMB   3/23/2022 12:15 PM Monae Pena PTA [facility]   3/25/2022  8:45 AM Burke Whitfield [facility]   3/30/2022 12:15 PM Monae Pena PTA [facility]

## 2022-03-11 ENCOUNTER — HOSPITAL ENCOUNTER (OUTPATIENT)
Dept: PHYSICAL THERAPY | Age: 37
Discharge: HOME | End: 2022-03-11
Payer: COMMERCIAL

## 2022-03-11 PROCEDURE — 97035 APP MDLTY 1+ULTRASOUND EA 15: CPT

## 2022-03-11 PROCEDURE — 97140 MANUAL THERAPY 1/> REGIONS: CPT

## 2022-03-11 PROCEDURE — 97110 THERAPEUTIC EXERCISES: CPT

## 2022-03-11 NOTE — PROGRESS NOTES
PT DAILY TREATMENT NOTE     Patient Name: Irving Renee  Date:3/11/2022  : 1985  [x]  Patient  Verified  Payor: BLUE CROSS / Plan: Ifeoma Johnston [id number] PPO / Product Type: PPO /    In time: 9:30 Out time:10:33  Total Treatment Time (min): 63  Visit #: 4 of     Medicare/BCBS Only   Total Timed Codes (min):  53 1:1 Treatment Time:  48       Treatment Area: Left shoulder pain [M25.512]    SUBJECTIVE  Pain Level (0-10 scale): 3-4   Any medication changes, allergies to medications, adverse drug reactions, diagnosis change, or new procedure performed?: [x] No    [] Yes (see summary sheet for update)  Subjective functional status/changes:   [] No changes reported  Pt reports his shoulder has been doing ok, it feels tight after doing yard work     OBJECTIVE    Modality rationale: decrease inflammation and decrease pain to improve the patients ability to tolerate overhead activity and ADLs   Min Type Additional Details    [] Estim:  []Unatt       []IFC  []Premod                        []Other:  []w/ice   []w/heat  Position:  Location:    [] Estim: []Att    []TENS instruct  []NMES                    []Other:  []w/US   []w/ice   []w/heat  Position:  Location:    []  Traction: [] Cervical       []Lumbar                       [] Prone          []Supine                       []Intermittent   []Continuous Lbs:  [] before manual  [] after manual   8 [x]  Ultrasound: []Continuous   [x] Pulsed                           []1MHz   [x]3MHz W/cm2: 1.5  Location: L shoulder jt line, AC joint    []  Iontophoresis with dexamethasone         Location: [] Take home patch   [] In clinic   10 []  Ice     [x]  heat  []  Ice massage  []  Laser   []  Anodyne Position:supine   Location:L shoulder    []  Laser with stim  []  Other:  Position:  Location:    []  Vasopneumatic Device    []  Right     []  Left  Pre-treatment girth:  Post-treatment girth:  Measured at (location):  Pressure:       [] lo [] med [] hi Temperature: [] lo [] med [] hi   [x] Skin assessment post-treatment:  [x]intact []redness- no adverse reaction    []redness  adverse reaction:     32 min Therapeutic Exercise:  [] See flow sheet :   Rationale: increase ROM and increase strength to improve the patients ability to perform overhead activity. 13 min Manual Therapy:  L shoulder: pec manual stretch, pec minor release, Gr III inferior glides with flexion and abduction bias, manual flexion and abduction stretching, scapular distraction in R sidelying. The manual therapy interventions were performed at a separate and distinct time from the therapeutic activities interventions. Rationale: decrease pain and increase ROM to improve pt tolerance for overhead activity            With   [] TE   [] TA   [] neuro   [] other: Patient Education: [x] Review HEP    [] Progressed/Changed HEP based on:   [] positioning   [] body mechanics   [] transfers   [] heat/ice application    [] other:      Other Objective/Functional Measures: initiated ultrasound, progressed HEP     Pain Level (0-10 scale) post treatment: 0    ASSESSMENT/Changes in Function: Pt was able to tolerate progression to quadruped weight shifting requiring tactile cues to keep serratus engaged to avoid scapular winging. External rotation was progressed to at 45 deg with UE resting on table to improve external rotation strength in higher ROM. Pt was also instructed in serratus pillow case slide to improve scapular stability and shoulder strength for overhead reaching. Ultrasound was implemented today with intention of promoting healing at [facility] joint ligament and supraspinatus tendon. Pt reported no inc in sx with tx today.      Patient will continue to benefit from skilled PT services to modify and progress therapeutic interventions, address functional mobility deficits, address ROM deficits, address strength deficits, analyze and address soft tissue restrictions, analyze and cue movement patterns, analyze and modify body mechanics/ergonomics, assess and modify postural abnormalities, address imbalance/dizziness and instruct in home and community integration to attain remaining goals. []  See Plan of Care  []  See progress note/recertification  []  See Discharge Summary         Progress towards goals / Updated goals:  Short Term Goals: To be accomplished in 1 weeks:  1) Goal: Patient will be independent and compliant with HEP in order to progress toward long term goals. Status at last note/certification: issued and reviewed Pt reports I and compliance with initial HEP 3/4/22  Long Term Goals: To be accomplished in 8-12 treatments:  1) Goal: Patient will improve FOTO assessment score to 74 pts in order to indicate improved functional abilities. Status at last note/certification: 55 pts  2) Goal: Patient will improve L shoulder flexion to 170 deg or better for overhead reaching  Status at last note/certification: 460 deg p! Addressing with serratus slides 3/11/22  3) Goal: Patient will report worst shoulder pain as 2/10 or less in order to progress toward personal goals. Status at last note/certification: 8/43  4) Goal: Patient will report overall at least 75% improvement in function in order to progress toward premorbid status. Status at last note/certification: n/a  5) Goal: Patient will demonstrated 1 mm grade strength improvement for overhead lifting  Status at last note/certification: grossly 3/5 p!     PLAN  [x]  Upgrade activities as tolerated     [x]  Continue plan of care  []  Update interventions per flow sheet       []  Discharge due to:_  []  Other:_      Conchis Steven 3/11/2022  7:16 AM    Future Appointments   Date Time Provider Karishma Ma   3/11/2022  9:30 AM Gail Rodriguez [facility]   3/15/2022  8:45 AM Amari Choudhary NP [facility] AMB   3/23/2022 12:15 PM Rancho Serra PTA [facility]   3/25/2022  8:45 AM Brunilda Hdz [facility]   3/30/2022 12:15 PM Chemo Sorensent, Diogenes Dye, [facility]

## 2022-03-14 ENCOUNTER — HOSPITAL ENCOUNTER (OUTPATIENT)
Dept: PHYSICAL THERAPY | Age: 37
Discharge: HOME | End: 2022-03-14
Payer: COMMERCIAL

## 2022-03-14 PROCEDURE — 97110 THERAPEUTIC EXERCISES: CPT

## 2022-03-14 PROCEDURE — 97530 THERAPEUTIC ACTIVITIES: CPT

## 2022-03-14 PROCEDURE — 97112 NEUROMUSCULAR REEDUCATION: CPT

## 2022-03-14 NOTE — PROGRESS NOTES
PT DAILY TREATMENT NOTE     Patient Name: Wilma Harrington  Date:3/14/2022  : 1985  [x]  Patient  Verified  Payor: BLUE CROSS / Plan: Ifeoma Johnston [id number] PPO / Product Type: PPO /    In time:10:49  Out time:12:03  Total Treatment Time (min): 74  Visit #: 5 of     Medicare/BCBS Only   Total Timed Codes (min):  64 1:1 Treatment Time:  59       Treatment Area: Left shoulder pain [M25.512]    SUBJECTIVE  Pain Level (0-10 scale): 2  Any medication changes, allergies to medications, adverse drug reactions, diagnosis change, or new procedure performed?: [x] No    [] Yes (see summary sheet for update)  Subjective functional status/changes:   [] No changes reported  \"My shoulder has been feeling better and better, I can tell it is healing. \"    OBJECTIVE    Modality rationale: decrease pain to improve the patients ability to relax and sleep following therapy session to improve restorative sleep patterns.     Min Type Additional Details    [x] Estim:  []Unatt       []IFC  []Premod                        []Other:  []w/ice   []w/heat  Position:   Location:     [] Estim: []Att    []TENS instruct  []NMES                    []Other:  []w/US   []w/ice   []w/heat  Position:  Location:    []  Traction: [] Cervical       []Lumbar                       [] Prone          []Supine                       []Intermittent   []Continuous Lbs:  [] before manual  [] after manual    []  Ultrasound: []Continuous   [] Pulsed                           []1MHz   []3MHz W/cm2:  Location:    []  Iontophoresis with dexamethasone         Location: [] Take home patch   [] In clinic   10 []  Ice     [x]  heat  []  Ice massage  []  Laser   []  Anodyne Position: supine  Location: left shoulder    []  Laser with stim  []  Other:  Position:  Location:    []  Vasopneumatic Device    []  Right     []  Left  Pre-treatment girth:  Post-treatment girth:  Measured at (location):  Pressure:       [] lo [] med [] hi   Temperature: [] lo [] med [] hi   [x] Skin assessment post-treatment:  [x]intact []redness- no adverse reaction    []redness  adverse reaction:       20 min Therapeutic Exercise:  [x] See flow sheet :   Rationale: increase ROM and increase strength to improve the patients ability to perform ADLs with improved shoulder/elbow strength and mobility. 15 min Therapeutic Activity:  [x]  See flow sheet :   Rationale: increase ROM, increase strength, improve coordination, improve balance, and increase proprioception  to improve the patients ability to perform functional overhead/forward lifts. Patient education: Jake Harness, progress toward goals, healing prognosis     29 min Neuromuscular Re-education:  [x]  See flow sheet :   Rationale: increase ROM, increase strength, improve coordination, improve balance and increase proprioception  to improve the patients ability to perform overhead functional lifts with improved scapular stabilization and neutral cervical posture. With   [x] TE   [] TA   [x] neuro   [] other: Patient Education: [x] Review HEP    [] Progressed/Changed HEP based on:   [] positioning   [] body mechanics   [] transfers   [] heat/ice application    [] other:      Other Objective/Functional Measures:   Shoulder AROM/PROM:                                           AROM (deg)                Right Left   Flexion 169 162   Extension 49 40   Scaption 174 170   ER at 45 Deg ABD 80    IR at 39 Deg ABD [facility]    Seated Flexion  169 162     Functional ER: symmetrical, more uncomfortable left  Functional IR: symmetrical, more uncomfortable left    Strength:   Right (/5) Left (/5)   GHJ   Flexion 5 5             Abduction 5 5             Extension 5 5             ER 5 4             IR 5 4   Upper Trapezius  5 5   Middle/Lower Trap 5 4     Pain Level (0-10 scale) post treatment: 0    ASSESSMENT/Changes in Function:   Patient has attended PT for 5 sessions for the treatment of left shoulder pain following left AC joint separation.  The patient demonstrates excellent progress at this time. He demonstrates improved shoulder strength and improved shoulder range of motion that has improved ease with reaching forward/laterally and improved ease with household chores. 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 He continues to be limited with overhead mobility and notes continued impaired shoulder endurance. Additional assessment includes:  Subjective Gains: less painful, \"piano key\" deformity reduced, improved muscular strength  Subjective Deficits: decreased strength overhead, decreased overhead mobility  % improvement: 70%  Pain   Average: 2-3/10       Best: 0/10     Worst: 5/10  Patient Goal: \"improve ease with overhead mobility, be able to do yoga again\"      Patient will continue to benefit from skilled PT services to modify and progress therapeutic interventions, address functional mobility deficits, address ROM deficits, address strength deficits, analyze and address soft tissue restrictions, analyze and cue movement patterns, analyze and modify body mechanics/ergonomics, assess and modify postural abnormalities, address imbalance/dizziness and instruct in home and community integration to attain remaining goals. []  See Plan of Care  []  See progress note/recertification  []  See Discharge Summary         Progress towards goals / Updated goals:  Short Term Goals: To be accomplished in 1 weeks:  1) Goal: Patient will be independent and compliant with HEP in order to progress toward long term goals. Status at last note/certification: issued and reviewed   Current: Pt reports I and compliance with initial HEP (3/4/22)  Long Term Goals: To be accomplished in 8-12 treatments:  1) Goal: Patient will improve FOTO assessment score to 74 pts in order to indicate improved functional abilities.   Status at last note/certification: 87 KYP  Current: progressing, FOTO 63 pts (3/14/22)  2) Goal: Patient will improve L shoulder flexion to 170 deg or better for overhead reaching  Status at last note/certification: 446 XSS p! Current: progressing, left shoulder flexion 162 deg (3/14/22)  3) Goal: Patient will report worst shoulder pain as 2/10 or less in order to progress toward personal goals. Status at last note/certification: 8/10  Current: progressing, 5/10 pain at worst (3/14/22)   4) Goal: Patient will report overall at least 75% improvement in function in order to progress toward premorbid status. Status at last note/certification: n/a  Current: progressing, 70% improvement (3/14/22)  5) Goal: Patient will demonstrated 1 mm grade strength improvement for overhead lifting  Status at last note/certification: grossly 3/5 p!   Current: progressing, grossly 4/5 (3/14/22)     PLAN  [x]  Upgrade activities as tolerated     [x]  Continue plan of care  []  Update interventions per flow sheet       []  Discharge due to:_  []  Other:_      Tobi Bourne 3/14/2022  9:52 AM    Future Appointments   Date Time Provider Karishma Ma   3/14/2022 10:45 AM Mati Michel [facility]   3/15/2022  8:45 AM YESENIA Gonzalez BS AMB   3/23/2022 12:15 PM Sonam Gomes PTA [facility]   3/25/2022  8:45 AM Mati Michel [facility]   3/30/2022 12:15 PM Sonam Gomes PTA [facility]

## 2022-03-14 NOTE — PROGRESS NOTES
[facility] AT [address]  Phone: [phone] Fax: [phone]  PROGRESS NOTE  Patient Name: Justino Mejias : 1985   Treatment/Medical Diagnosis: Left shoulder pain [M25.512]   Referral Source: Katia Loza MD     Date of Initial Visit: 22 Attended Visits: 5 Missed Visits: 0     SUMMARY OF TREATMENT  Pt is a 39 y.o. Delaney Pamellar presents with L shoulder pain s/p class 3 AC joint seperation. Treatment has consisted of: therapeutic exercise to improved UE strength/mobility; therapeutic activities to improve forward/overhead reach/lift; neuromuscular re-education to improve scapular stability, cervical stability, and UE movement patterns; physical agent/modality for symptom management;manual therapy for symptom relief and improved UE/cervical mobility; patient education to improve symptom management; self Care training; and functional mobility training. CURRENT STATUS  Patient has attended PT for 5 sessions for the treatment of left shoulder pain following left AC joint separation. The patient demonstrates excellent progress at this time. He demonstrates improved shoulder strength and improved shoulder range of motion that has improved ease with reaching forward/laterally and improved ease with household chores. 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 He continues to be limited with overhead mobility and notes continued impaired shoulder endurance.  Additional assessment includes:  Subjective Gains: less painful, \"piano key\" deformity reduced, improved muscular strength  Subjective Deficits: decreased strength overhead, decreased overhead mobility  % improvement: 70%  Pain   Average: 2-3/10                     Best: 0/10                   Worst: 5/10  Patient Goal: \"improve ease with overhead mobility, be able to do yoga again\"   Objective Measures:   Shoulder AROM/PROM:                                           AROM (deg)                 Right Left   Flexion 169 162   Extension 49 40   Scaption 174 170   ER at 45 Deg ABD 80     IR at 45 Deg [facility]BD [facility]     Seated Flexion  169 162      Functional ER: symmetrical, more uncomfortable left  Functional IR: symmetrical, more uncomfortable left     Strength:    Right (/5) Left (/5)   GHJ   Flexion 5 5             Abduction 5 5             Extension 5 5             ER 5 4             IR 5 4   Upper Trapezius  5 5   Middle/Lower Trap 5 4        Current Goals:  Short Term Goals: To be accomplished in 1 weeks:  1) Goal: Patient will be independent and compliant with HEP in order to progress toward long term goals. Status at last note/certification: issued and reviewed   Current: Pt reports I and compliance with initial HEP (3/4/22)  Long Term Goals: To be accomplished in 8-12 treatments:  1) Goal: Patient will improve FOTO assessment score to 74 pts in order to indicate improved functional abilities. Status at last note/certification: 68 MON  Current: progressing, FOTO 63 pts (3/14/22)  2) Goal: Patient will improve L shoulder flexion to 170 deg or better for overhead reaching  Status at last note/certification: 004 DZF p!   Current: progressing, left shoulder flexion 162 deg (3/14/22)  3) Goal: Patient will report worst shoulder pain as 2/10 or less in order to progress toward personal goals. Status at last note/certification: 8/10  Current: progressing, 5/10 pain at worst (3/14/22)   4) Goal: Patient will report overall at least 75% improvement in function in order to progress toward premorbid status. Status at last note/certification: n/a  Current: progressing, 70% improvement (3/14/22)  5) Goal: Patient will demonstrated 1 mm grade strength improvement for overhead lifting  Status at last note/certification: grossly 3/5 p!   Current: progressing, grossly 4/5 (3/14/22)          New Goals to be achieved in __8__  treatments:  1) Goal: Patient will improve FOTO assessment score to 74 pts in order to indicate improved functional abilities. Status at last note/certification: FOTO 63 pts   2) Goal: Patient will improve L shoulder flexion to 170 deg or better for overhead reaching  Status at last note/certification: left shoulder flexion 162 deg   3) Goal: Patient will report worst shoulder pain as 2/10 or less in order to progress toward personal goals. Status at last note/certification:  2/51 pain at worst   4) Goal: Patient will report overall at least 75% improvement in function in order to progress toward premorbid status. Status at last note/certification: 29% improvement  5) Goal: Patient will demonstrate 5/5 left shoulder ER MMT for improved overhead lifting. Status at last note/certification: 4/5    RECOMMENDATIONS  Pt to continue with skilled therapy for 1-2x/week for 8 sessions to improve overhead mobility and endurance. If you have any questions/comments please contact us directly at [phone]   Thank you for allowing us to assist in the care of your patient. Therapist Signature: Danae Vee Date: 3/14/2022   Reporting Period  2/18/22-3/14/22 Time: 12:10 PM   NOTE TO PHYSICIAN:  PLEASE COMPLETE THE ORDERS BELOW AND FAX TO   [facility]: [phone]. If you are unable to process this request in 24 hours please contact our office: [phone].  ___ I have read the above report and request that my patient continue as recommended.   ___ I have read the above report and request that my patient continue therapy with the following changes/special instructions:_________________________________________________________   ___ I have read the above report and request that my patient be discharged from therapy.      Physician Signature:        Date:       Time:                                                        Luiza Williamson MD.

## 2022-03-15 ENCOUNTER — OFFICE VISIT (OUTPATIENT)
Dept: INTERNAL MEDICINE CLINIC | Age: 37
End: 2022-03-15
Payer: COMMERCIAL

## 2022-03-15 VITALS
WEIGHT: 173 LBS | RESPIRATION RATE: 18 BRPM | BODY MASS INDEX: 24.77 KG/M2 | HEART RATE: 87 BPM | SYSTOLIC BLOOD PRESSURE: 130 MMHG | TEMPERATURE: 96.8 F | HEIGHT: 70 IN | OXYGEN SATURATION: 98 % | DIASTOLIC BLOOD PRESSURE: 88 MMHG

## 2022-03-15 DIAGNOSIS — J45.20 MILD INTERMITTENT ASTHMA WITHOUT COMPLICATION: ICD-10-CM

## 2022-03-15 DIAGNOSIS — F41.9 ANXIETY AND DEPRESSION: Primary | ICD-10-CM

## 2022-03-15 DIAGNOSIS — R03.0 ELEVATED BLOOD PRESSURE READING: ICD-10-CM

## 2022-03-15 DIAGNOSIS — F32.A ANXIETY AND DEPRESSION: Primary | ICD-10-CM

## 2022-03-15 PROCEDURE — 99214 OFFICE O/P EST MOD 30 MIN: CPT | Performed by: NURSE PRACTITIONER

## 2022-03-15 RX ORDER — ESCITALOPRAM OXALATE 10 MG/1
10 TABLET ORAL DAILY
Qty: 90 TABLET | Refills: 1 | Status: SHIPPED | OUTPATIENT
Start: 2022-03-15 | End: 2022-05-19 | Stop reason: SDUPTHER

## 2022-03-15 RX ORDER — ESCITALOPRAM OXALATE 10 MG/1
10 TABLET ORAL DAILY
COMMUNITY
Start: 2022-02-15 | End: 2022-03-15 | Stop reason: SDUPTHER

## 2022-03-15 NOTE — PROGRESS NOTES
ROOM # 1  Identified pt with two pt identifiers(name and ). Reviewed record in preparation for visit and have obtained necessary documentation. Chief Complaint   Patient presents with   [employer] Establish Care    Medication Refill     [address] preferred language for health care discussion is english/other. Is the patient using any DME equipment during OV? NO    Timmy Arenas is due for:  Health Maintenance Due   Topic    Hepatitis C Screening     COVID-19 Vaccine (1)    Flu Vaccine (1)     Health Maintenance reviewed and discussed per provider  Please order/place referral if appropriate. 1. For patients aged 39-70: Has the patient had a colonoscopy? NA - based on age   If the patient is female:    2. For patients aged 41-77: Has the patient had a mammogram within the past 2 years? No    3. For patients aged 21-65: Has the patient had a pap smear? No  Advance Directive:  1. Do you have an advance directive in place? Patient Reply: NO    2. If not, would you like material regarding how to put one in place? NO    Coordination of Care:  1. Have you been to the ER, urgent care clinic since your last visit? Hospitalized since your last visit? NO    2. Have you seen or consulted any other health care providers outside of the [address] [place] [address] since your last visit? Include any pap smears or colon screening. NO    Patient is accompanied by self I have received verbal consent from Timmy Arenas to discuss any/all medical information while they are present in the room.     Learning Assessment:  Learning Assessment 10/10/2017   PRIMARY LEARNER Patient   HIGHEST LEVEL OF EDUCATION - PRIMARY LEARNER  > 4 YEARS OF COLLEGE   BARRIERS PRIMARY LEARNER NONE   CO-LEARNER CAREGIVER No   PRIMARY LANGUAGE ENGLISH   LEARNER PREFERENCE PRIMARY DEMONSTRATION   ANSWERED BY patient   RELATIONSHIP SELF     Depression Screening:  3 most recent [facility] 36 Screens 3/15/2022 2018 10/10/2017   Little interest or pleasure in doing things Not at all Not at all Not at all   Feeling down, depressed, irritable, or hopeless Not at all Not at all Not at all   Total Score PHQ 2 0 0 0     Recent Travel Screening and Travel History documentation     Travel Screening     No screening recorded since 03/14/22 0000     Travel History   Travel since 02/15/22    No documented travel since 02/15/22

## 2022-03-15 NOTE — PROGRESS NOTES
HISTORY OF PRESENT ILLNESS  Mahesh Blake is a 39 y.o. male. Here to establish care and med refill with PMHx of anxiety,depression and asthma. Establish Care  Pertinent negatives include no chest pain, no headaches and no shortness of breath. 1) Anxiety and Depression - controlled - taking Lexapro 10 mg daily - is following therapist once weekly   Feels way more manageable. 2) Asthma  Current control: Good   Current level: mild intermittent  Current symptoms: none  Current controller: none  Last flareup: 2 months ago. Number of flareups in past year: 2  Current symptom relief med: Ventolin    /88 (BP 1 Location: Right arm, BP Patient Position: Sitting, BP Cuff Size: Adult long)   Pulse 87   Temp 96.8 °F (36 °C) (Temporal)   Resp 18   Ht 5' 10\" (1.778 m)   Wt 173 lb (78.5 kg)   SpO2 98%   BMI 24.82 kg/m²   Current Outpatient Medications   Medication Sig Dispense Refill    escitalopram oxalate (LEXAPRO) 10 mg tablet Take 1 Tablet by mouth daily. Indications: anxiousness associated with depression 90 Tablet 1    VENTOLIN HFA 90 mcg/actuation inhaler TAKE 2 PUFFS BY INHALATION EVERY FOUR (4) HOURS AS NEEDED FOR WHEEZING. 1 Inhaler 0       Review of Systems   Constitutional: Negative for chills, fever and malaise/fatigue. Eyes: Negative for blurred vision and double vision. Respiratory: Negative for cough, shortness of breath and wheezing. Cardiovascular: Negative for chest pain, palpitations and leg swelling. Neurological: Negative for dizziness and headaches. Psychiatric/Behavioral: Negative for depression. The patient is nervous/anxious (come and goes). Physical Exam  Vitals and nursing note reviewed. Constitutional:       General: He is not in acute distress. Appearance: Normal appearance. He is not ill-appearing. HENT:      Head: Normocephalic.       Right Ear: Tympanic membrane, ear canal and external ear normal.      Left Ear: Tympanic membrane, ear canal and external ear normal.      Mouth/Throat:      Comments: MASK  Eyes:      General: No scleral icterus. Extraocular Movements: Extraocular movements intact. Conjunctiva/sclera: Conjunctivae normal.      Pupils: Pupils are equal, round, and reactive to light. Cardiovascular:      Rate and Rhythm: Normal rate and regular rhythm. Pulses: Normal pulses. Heart sounds: Normal heart sounds. Pulmonary:      Effort: Pulmonary effort is normal. No respiratory distress. Breath sounds: Normal breath sounds. No wheezing. Abdominal:      Tenderness: There is no right CVA tenderness or left CVA tenderness. Musculoskeletal:         General: Normal range of motion. Cervical back: Normal range of motion. Right lower leg: No edema. Left lower leg: No edema. Lymphadenopathy:      Cervical: No cervical adenopathy. Skin:     General: Skin is warm and dry. Findings: No lesion or rash. Neurological:      General: No focal deficit present. Mental Status: He is alert and oriented to person, place, and time. Psychiatric:         Attention and Perception: Attention normal.         Mood and Affect: Mood and affect normal.         Speech: Speech normal.         Behavior: Behavior normal. Behavior is cooperative. Thought Content: Thought content normal.         Judgment: Judgment normal.         ASSESSMENT and PLAN  Diagnoses and all orders for this visit:    1. Anxiety and depression  -     escitalopram oxalate (LEXAPRO) 10 mg tablet; Take 1 Tablet by mouth daily. Indications: anxiousness associated with depression    2. Mild intermittent asthma without complication    3.  Elevated blood pressure reading      Anxiety and depression - controlled with lexapro continue same  Mild intermittent asthma - controlled continue albuterol   Elevated blood pressure - came down with rest - patient educational instructions for high blood pressure   Pt recommended he check BP BID for a week and if 140/90 or above make appointment sooner for evaluation   Believe the caffeine and current NSAID use is contributing factor     Follow-up and Dispositions    · Return in about 6 months (around 9/15/2022) for anxiety and depression.

## 2022-03-15 NOTE — PATIENT INSTRUCTIONS
Elevated Blood Pressure: Care Instructions  Your Care Instructions    Blood pressure is a measure of how hard the blood pushes against the walls of your arteries. It's normal for blood pressure to go up and down throughout the day. But if it stays up over time, you have high blood pressure. Two numbers tell you your blood pressure. The first number is the systolic pressure. It shows how hard the blood pushes when your heart is pumping. The second number is the diastolic pressure. It shows how hard the blood pushes between heartbeats, when your heart is relaxed and filling with blood. An ideal blood pressure in adults is less than 120/80 (say \"120 over 80\"). High blood pressure is 140/90 or higher. You have high blood pressure if your top number is 140 or higher or your bottom number is 90 or higher, or both. The main test for high blood pressure is simple, fast, and painless. To diagnose high blood pressure, your doctor will test your blood pressure at different times. After testing your blood pressure, your doctor may ask you to test it again when you are home. If you are diagnosed with high blood pressure, you can work with your doctor to make a long-term plan to manage it. Follow-up care is a key part of your treatment and safety. Be sure to make and go to all appointments, and call your doctor if you are having problems. It's also a good idea to know your test results and keep a list of the medicines you take. How can you care for yourself at home? · Do not smoke. Smoking increases your risk for heart attack and stroke. If you need help quitting, talk to your doctor about stop-smoking programs and medicines. These can increase your chances of quitting for good. · Stay at a healthy weight. · Try to limit how much sodium you eat to less than 2,300 milligrams (mg) a day. Your doctor may ask you to try to eat less than 1,500 mg a day. · Be physically active.  Get at least 30 minutes of exercise on most days of the week. Walking is a good choice. You also may want to do other activities, such as running, swimming, cycling, or playing tennis or team sports. · Avoid or limit alcohol. Talk to your doctor about whether you can drink any alcohol. · Eat plenty of fruits, vegetables, and low-fat dairy products. Eat less saturated and total fats. · Learn how to check your blood pressure at home. When should you call for help? Call your doctor now or seek immediate medical care if:  ? · Your blood pressure is much higher than normal (such as 180/110 or higher). ? · You think high blood pressure is causing symptoms such as:  ¨ Severe headache. ¨ Blurry vision. ? Watch closely for changes in your health, and be sure to contact your doctor if:  ? · You do not get better as expected. Where can you learn more? Go to http://www.gray.com/. Enter R566 in the search box to learn more about \"Elevated Blood Pressure: Care Instructions. \"  Current as of: September 21, 2016  Content Version: 11.4  © 5360-6391 [employer]. Care instructions adapted under license by [email or web address] (which disclaims liability or warranty for this information). If you have questions about a medical condition or this instruction, always ask your healthcare professional. [address] any warranty or liability for your use of this information.

## 2022-03-16 ENCOUNTER — APPOINTMENT (OUTPATIENT)
Dept: PHYSICAL THERAPY | Age: 37
End: 2022-03-16
Payer: COMMERCIAL

## 2022-03-18 ENCOUNTER — APPOINTMENT (OUTPATIENT)
Dept: PHYSICAL THERAPY | Age: 37
End: 2022-03-18
Payer: COMMERCIAL

## 2022-03-23 ENCOUNTER — HOSPITAL ENCOUNTER (OUTPATIENT)
Dept: PHYSICAL THERAPY | Age: 37
Discharge: HOME | End: 2022-03-23
Payer: COMMERCIAL

## 2022-03-23 PROCEDURE — 97110 THERAPEUTIC EXERCISES: CPT

## 2022-03-23 PROCEDURE — 97112 NEUROMUSCULAR REEDUCATION: CPT

## 2022-03-23 PROCEDURE — 97035 APP MDLTY 1+ULTRASOUND EA 15: CPT

## 2022-03-23 NOTE — PROGRESS NOTES
PT DAILY TREATMENT NOTE     Patient Name: Clarisa Solo  Date:3/23/2022  : 1985  [x]  Patient  Verified  Payor: BLUE CROSS / Plan: Ife[facility] [employer] [id number] PPO / Product Type: PPO /    In time: 12:24  pm       Out time: 1:20 pm  Total Treatment Time (min): 56  Visit #: 1 of 8    Medicare/BCBS Only   Total Timed Codes (min): 46 1:1 Treatment Time: 40       Treatment Area: Left shoulder pain [M25.512]    SUBJECTIVE  Pain Level (0-10 scale): 5  Any medication changes, allergies to medications, adverse drug reactions, diagnosis change, or new procedure performed?: [x] No    [] Yes (see summary sheet for update)  Subjective functional status/changes:   [] No changes reported  \"I think I overdid it. \"    OBJECTIVE    Modality rationale: decrease inflammation and decrease pain to improve the patients ability to relax and sleep following therapy session to improve restorative sleep patterns. Min Type Additional Details    [] Estim:  []Unatt       []IFC  []Premod                        []Other:  []w/ice   []w/heat  Position:   Location:    8 [x]  Ultrasound: []Continuous   [x] Pulsed 50%                           []1MHz   [x]3MHz W/cm2: 1.0  Location: (L) biceps tendon and posterior deltoid   10 [x]  Ice     []  Heat   Position: supine with LEs elevated on wedge  Location: left shoulder    []  Vasopneumatic Device    []  Right     []  Left  Pre-treatment girth:  Post-treatment girth:  Measured at (location):  Pressure:       [] lo [] med [] hi   Temperature: [] lo [] med [] hi   [x] Skin assessment post-treatment:  [x]intact []redness- no adverse reaction    []redness  adverse reaction:       11 min Therapeutic Exercise:  [x] See flow sheet:   Rationale: increase ROM and increase strength to improve the patients ability to perform ADLs with improved shoulder/elbow strength and mobility.     12 min Therapeutic Activity:  [x]  See flow sheet:    Rationale: increase ROM, increase strength, improve coordination, improve balance, and increase proprioception  to improve the patients ability to perform functional overhead/forward lifts. Patient education: Concepcion Malhotra, progress toward goals, healing prognosis     15 min Neuromuscular Re-education:  [x]  See flow sheet:    Rationale: increase ROM, increase strength, improve coordination, improve balance and increase proprioception  to improve the patients ability to perform overhead functional lifts with improved scapular stabilization and neutral cervical posture. With   [x] TE   [] TA   [x] neuro   [] other: Patient Education: [x] Review HEP    [] Progressed/Changed HEP based on:   [] positioning   [] body mechanics   [] transfers   [] heat/ice application    [] other:      Other Objective/Functional Measures:       Pain Level (0-10 scale) post treatment: 0 - sore    ASSESSMENT/Changes in Function:   Patient with elevated pain levels today due to increased activity over the weekend (demonstrated ratcheting technique like mixing a bowl of soup). Noted painful arc with all serratus anterior slide attempts, which decreases with cognizant shoulder ER. Reinitiate US for anti-inflammatory benefits with good tolerance. Patient will continue to benefit from skilled PT services to modify and progress therapeutic interventions, address functional mobility deficits, address ROM deficits, address strength deficits, analyze and address soft tissue restrictions, analyze and cue movement patterns, analyze and modify body mechanics/ergonomics, assess and modify postural abnormalities, address imbalance/dizziness and instruct in home and community integration to attain remaining goals. []  See Plan of Care  [x]  See progress note/recertification  []  See Discharge Summary         Progress towards goals / Updated goals:  1) Goal: Patient will improve FOTO assessment score to 74 pts in order to indicate improved functional abilities.   Status at last note/certification: FOTO 63 pts   2) Goal: Patient will improve L shoulder flexion to 170 deg or better for overhead reaching  Status at last note/certification: left shoulder flexion 162 deg   3) Goal: Patient will report worst shoulder pain as 2/10 or less in order to progress toward personal goals. Status at last note/certification:  7/40 pain at worst   4) Goal: Patient will report overall at least 75% improvement in function in order to progress toward premorbid status. Status at last note/certification: 50% improvement  5) Goal: Patient will demonstrate 5/5 left shoulder ER MMT for improved overhead lifting.   Status at last note/certification: 4/5    PLAN  [x]  Upgrade activities as tolerated     [x]  Continue plan of care  []  Update interventions per flow sheet       []  Discharge due to:_  [x]  Other: patient to f/u with referring MD next week following Friday's PT session     Jacob Smith PTA 3/23/2022     Future Appointments   Date Time Provider Karishma Ma   3/25/2022  8:45 AM Vanessa Fregoso [facility]   3/30/2022 12:15 PM Jaylon Richard, PTA [facility]   4/6/2022  3:00 PM Omer Monreal NP [facility]   4/8/2022  8:45 AM Kolby France., PT [facility]   4/15/2022  8:45 AM Jaylon Richard, PTA [facility]   4/20/2022  1:00 PM [facility] PT [facility] 2 [facility]   4/22/2022  8:45 AM Kolby France., PT [facility]   4/26/2022 12:15 PM [facility] PT [facility] 2 [facility]   4/29/2022  8:45 AM Vanessa Fregoso [facility]

## 2022-03-25 ENCOUNTER — HOSPITAL ENCOUNTER (OUTPATIENT)
Dept: PHYSICAL THERAPY | Age: 37
End: 2022-03-25
Payer: COMMERCIAL

## 2022-03-30 ENCOUNTER — HOSPITAL ENCOUNTER (OUTPATIENT)
Dept: PHYSICAL THERAPY | Age: 37
Discharge: HOME | End: 2022-03-30
Payer: COMMERCIAL

## 2022-03-30 PROCEDURE — 97110 THERAPEUTIC EXERCISES: CPT

## 2022-03-30 PROCEDURE — 97140 MANUAL THERAPY 1/> REGIONS: CPT

## 2022-03-30 PROCEDURE — 97530 THERAPEUTIC ACTIVITIES: CPT

## 2022-03-30 PROCEDURE — 97112 NEUROMUSCULAR REEDUCATION: CPT

## 2022-03-30 NOTE — PROGRESS NOTES
PT DAILY TREATMENT NOTE     Patient Name: Florencio Bence  Date:3/30/2022  : 1985  [x]  Patient  Verified  Payor: BLUE CROSS / Plan: [facility] PPO / Product Type: PPO /    In time: 12:30 pm       Out time: 1:20 pm  Total Treatment Time (min): 50  Visit #: 2 of 8    Medicare/BCBS Only   Total Timed Codes (min): 40 1:1 Treatment Time: 40       Treatment Area: Left shoulder pain [M25.512]    SUBJECTIVE  Pain Level (0-10 scale): 3  Any medication changes, allergies to medications, adverse drug reactions, diagnosis change, or new procedure performed?: [x] No    [] Yes (see summary sheet for update)  Subjective functional status/changes:   [] No changes reported  \"It has been feeling a little better today. I have been putting the ice on my neck instead of my shoulder and I think that is helping. \"    OBJECTIVE    Modality rationale: decrease inflammation and decrease pain to improve the patients ability to relax and sleep following therapy session to improve restorative sleep patterns. Min Type Additional Details    [] Estim:  []Unatt       []IFC  []Premod                        []Other:  []w/ice   []w/heat  Position:   Location:    NT []  Ultrasound: []Continuous   [] Pulsed 50%                           []1MHz   []3MHz W/cm2:   Location:    10 [x]  Ice     []  Heat   Position: prone  Location: (B) trapezius    []  Vasopneumatic Device    []  Right     []  Left  Pre-treatment girth:  Post-treatment girth:  Measured at (location):  Pressure:       [] lo [] med [] hi   Temperature: [] lo [] med [] hi   [x] Skin assessment post-treatment:  [x]intact []redness- no adverse reaction    []redness  adverse reaction:       12 min Therapeutic Exercise:  [x] See flow sheet:   Rationale: increase ROM and increase strength to improve the patients ability to perform ADLs with improved shoulder/elbow strength and mobility.     10 min Therapeutic Activity:  [x]  See flow sheet:    Rationale: increase ROM, increase strength, improve coordination, improve balance, and increase proprioception  to improve the patients ability to perform functional overhead/forward lifts. Patient education: Alexandru Fletcher, progress toward goals, healing prognosis     9 min Neuromuscular Re-education:  [x]  See flow sheet: added scapular I in prone (patient challenged)   Rationale: increase ROM, increase strength, improve coordination, improve balance and increase proprioception  to improve the patients ability to perform overhead functional lifts with improved scapular stabilization and neutral cervical posture. 9 min Manual Therapy:  left shoulder: pec manual stretch, pectoralis major and minor STM, Gr III inferior glides with flexion and abduction bias; massage gun STM to (B) pericapulars f/b grade III/IV PA mobs to T/S   The manual therapy interventions were performed at a separate and distinct time from the therapeutic activities interventions. Rationale: decrease pain and increase ROM to improve pt tolerance for overhead activity          With   [x] TE   [] TA   [x] neuro   [] other: Patient Education: [x] Review HEP - condensed for clarity (doorway pectoral stretch, resisted shoulder external rotation with rolled towel at elbow to minimize pect compensation, counter plank with wide spacing, prone scapular retractions, and serratus anterior slides using RTB around forearms)     Other Objective/Functional Measures:       Pain Level (0-10 scale) post treatment: 0 - sore    ASSESSMENT/Changes in Function:   Patient demonstrates improved SHR with all shoulder elevation attempts; no c/o anterior shoulder pinching when performing wall slides with proper form. Improved tolerance to CKC strengthening with isolation of the scapular protractors and anterior deltoid as opposed to the pectorals.     Patient will continue to benefit from skilled PT services to modify and progress therapeutic interventions, address functional mobility deficits, address ROM deficits, address strength deficits, analyze and address soft tissue restrictions, analyze and cue movement patterns, analyze and modify body mechanics/ergonomics, assess and modify postural abnormalities, address imbalance/dizziness and instruct in home and community integration to attain remaining goals. []  See Plan of Care  [x]  See progress note/recertification  []  See Discharge Summary         Progress towards goals / Updated goals:  1) Goal: Patient will improve FOTO assessment score to 74 pts in order to indicate improved functional abilities. Status at last note/certification: FOTO 63 pts   2) Goal: Patient will improve L shoulder flexion to 170 deg or better for overhead reaching  Status at last note/certification: left shoulder flexion 162 deg   3) Goal: Patient will report worst shoulder pain as 2/10 or less in order to progress toward personal goals. Status at last note/certification:  6/90 pain at worst   4) Goal: Patient will report overall at least 75% improvement in function in order to progress toward premorbid status. Status at last note/certification: 30% improvement  5) Goal: Patient will demonstrate 5/5 left shoulder ER MMT for improved overhead lifting.   Status at last note/certification: 4/5    PLAN  [x]  Upgrade activities as tolerated     [x]  Continue plan of care  []  Update interventions per flow sheet       []  Discharge due to:_  [x]  Other: patient to f/u with referring MD next week following Friday's PT session; assess response to progressed HEP      Vaibhav Ching PTA 3/30/2022     Future Appointments   Date Time Provider Karishma Ma   4/6/2022  3:00 PM YESENIA Davis [facility]   4/8/2022  8:45 AM Janet Ar., PT [facility]   4/15/2022  8:45 AM Maurita Kehr, PTA [facility]   4/20/2022  1:00 PM [facility] PT ABBIE 2 [facility]   4/22/2022  8:45 AM Janet Munguia, PT [facility]   4/26/2022 12:15 PM [facility] 4/29/2022  8:45 AM Chaitanya Palma [facility]

## 2022-04-01 ENCOUNTER — HOSPITAL ENCOUNTER (OUTPATIENT)
Dept: PHYSICAL THERAPY | Age: 37
Discharge: HOME | End: 2022-04-01
Payer: COMMERCIAL

## 2022-04-01 PROCEDURE — 97112 NEUROMUSCULAR REEDUCATION: CPT

## 2022-04-01 PROCEDURE — 97110 THERAPEUTIC EXERCISES: CPT

## 2022-04-01 NOTE — PROGRESS NOTES
PT DAILY TREATMENT NOTE     Patient Name: Rashaun Rasmussen  Date:2022  : 1985  [x]  Patient  Verified  Payor: BLUE CROSS / Plan: Ifeoma Johnston [id number] PPO / Product Type: PPO /    In time:2:22  Out time:3:20  Total Treatment Time (min): 58  Visit #: 3 of 8    Medicare/BCBS Only   Total Timed Codes (min):  48 1:1 Treatment Time:  45       Treatment Area: Left shoulder pain [M25.512]    SUBJECTIVE  Pain Level (0-10 scale): 4  Any medication changes, allergies to medications, adverse drug reactions, diagnosis change, or new procedure performed?: [x] No    [] Yes (see summary sheet for update)  Subjective functional status/changes:   [] No changes reported  \"The soreness kind of moves around but I am not feeling too bad right now, just some soreness. \"    OBJECTIVE    Modality rationale: decrease pain to improve the patients ability to relax and sleep following therapy session to improve restorative sleep patterns.     Min Type Additional Details    [x] Estim:  []Unatt       []IFC  []Premod                        []Other:  []w/ice   []w/heat  Position:   Location:     [] Estim: []Att    []TENS instruct  []NMES                    []Other:  []w/US   []w/ice   []w/heat  Position:  Location:    []  Traction: [] Cervical       []Lumbar                       [] Prone          []Supine                       []Intermittent   []Continuous Lbs:  [] before manual  [] after manual    []  Ultrasound: []Continuous   [] Pulsed                           []1MHz   []3MHz W/cm2:  Location:    []  Iontophoresis with dexamethasone         Location: [] Take home patch   [] In clinic   10 [x]  Ice     []  heat  []  Ice massage  []  Laser   []  Anodyne Position: seated  Location: left shoulder    []  Laser with stim  []  Other:  Position:  Location:    []  Vasopneumatic Device    []  Right     []  Left  Pre-treatment girth:  Post-treatment girth:  Measured at (location):  Pressure:       [] lo [] med [] hi   Temperature: [] lo [] med [] hi   [x] Skin assessment post-treatment:  [x]intact []redness- no adverse reaction    []redness  adverse reaction:       21 min Therapeutic Exercise:  [x] See flow sheet :   Rationale: increase ROM and increase strength to improve the patients ability to perform ADLs with improved shoulder/elbow strength and mobility. 27 min Neuromuscular Re-education:  [x]  See flow sheet :   Rationale: increase ROM, increase strength, improve coordination, improve balance and increase proprioception  to improve the patients ability to perform overhead functional lifts with improved scapular stabilization and neutral cervical posture. With   [x] TE   [] TA   [x] neuro   [] other: Patient Education: [x] Review HEP    [] Progressed/Changed HEP based on:   [] positioning   [] body mechanics   [] transfers   [] heat/ice application    [] other:      Other Objective/Functional Measures: -Left shoulder flexion AROM 166 deg   -Added following today   -GHJ ER Isometrics with green band at 90/90 position   -Shoulder flexion with horizonal abduction isometric    Pain Level (0-10 scale) post treatment: 4    ASSESSMENT/Changes in Function: Patient continues to require consistent verbal cuing for correct scapular protraction throughout today's interventions. He is significantly challenged by [facility] external rotation isometrics in multiple positions but does not report pain with these stability interventions. Progress to controlled mobility at next session (GHJ ER to 90/90 position).      Patient will continue to benefit from skilled PT services to modify and progress therapeutic interventions, address functional mobility deficits, address ROM deficits, address strength deficits, analyze and address soft tissue restrictions, analyze and cue movement patterns, analyze and modify body mechanics/ergonomics, assess and modify postural abnormalities, address imbalance/dizziness and instruct in home and community integration to attain remaining goals. []  See Plan of Care  []  See progress note/recertification  []  See Discharge Summary         Progress towards goals / Updated goals:  1) Goal: Patient will improve FOTO assessment score to 74 pts in order to indicate improved functional abilities. Status at last note/certification: FOTO 63 pts   Current:   2) Goal: Patient will improve L shoulder flexion to 170 deg or better for overhead reaching  Status at last note/certification: left shoulder flexion 162 deg   Current: progressing, left shoulder flexion 166 deg (4/1/22)  3) Goal: Patient will report worst shoulder pain as 2/10 or less in order to progress toward personal goals. Status at last note/certification:  5/10 pain at worst   Current:   4) Goal: Patient will report overall at least 75% improvement in function in order to progress toward premorbid status. Status at last note/certification: 44% improvement  Current:   5) Goal: Patient will demonstrate 5/5 left shoulder ER MMT for improved overhead lifting.   Status at last note/certification: 4/5  Current: addressing with ER walkout (4/1/22)    PLAN  [x]  Upgrade activities as tolerated     [x]  Continue plan of care  []  Update interventions per flow sheet       []  Discharge due to:_  []  Other:_      Rayne Sanchez 4/1/2022  7:58 AM    Future Appointments   Date Time Provider Karishma Ma   4/1/2022  2:15 PM Olga Claros [facility]   4/6/2022  3:00 PM Isabelle Bray NP [facility]   4/8/2022  8:45 AM Gila Hu., PT [facility]   4/15/2022  8:45 AM Amaris Montez, BRADY [facility]   4/20/2022  1:00 PM [facility] PT GHENT 2 [facility]   4/22/2022  8:45 AM Gila Hu., PT [facility]   4/26/2022 12:15 PM [facility] PT GHENT 2 [facility]   4/29/2022  8:45 AM Olga Claros [facility]

## 2022-04-08 ENCOUNTER — HOSPITAL ENCOUNTER (OUTPATIENT)
Dept: PHYSICAL THERAPY | Age: 37
Discharge: HOME | End: 2022-04-08
Payer: COMMERCIAL

## 2022-04-08 PROCEDURE — 97112 NEUROMUSCULAR REEDUCATION: CPT

## 2022-04-08 PROCEDURE — 97110 THERAPEUTIC EXERCISES: CPT

## 2022-04-08 NOTE — PROGRESS NOTES
PT DAILY TREATMENT NOTE     Patient Name: Rashaun Rasmussen  Date:2022  : 1985  [x]  Patient  Verified  Payor: BLUE CROSS / Plan: [facility] PPO / Product Type: PPO /    In time:829 Out time:928  Total Treatment Time (min): 59  Visit #: 4 of 8    Medicare/BCBS Only   Total Timed Codes (min):  54 1:1 Treatment Time:  54       Treatment Area: Left shoulder pain [M25.512]    SUBJECTIVE  Pain Level (0-10 scale): 3-4/10   Any medication changes, allergies to medications, adverse drug reactions, diagnosis change, or new procedure performed?: [x] No    [] Yes (see summary sheet for update)  Subjective functional status/changes:   [] No changes reported  Patient reports better mobility overall     OBJECTIVE    Modality rationale: decrease pain to improve the patients ability to relax and sleep following therapy session to improve restorative sleep patterns.     Min Type Additional Details    [x] Estim:  []Unatt       []IFC  []Premod                        []Other:  []w/ice   []w/heat  Position:   Location:     [] Estim: []Att    []TENS instruct  []NMES                    []Other:  []w/US   []w/ice   []w/heat  Position:  Location:    []  Traction: [] Cervical       []Lumbar                       [] Prone          []Supine                       []Intermittent   []Continuous Lbs:  [] before manual  [] after manual    []  Ultrasound: []Continuous   [] Pulsed                           []1MHz   []3MHz W/cm2:  Location:    []  Iontophoresis with dexamethasone         Location: [] Take home patch   [] In clinic   5 [x]  Ice     []  heat  []  Ice massage  []  Laser   []  Anodyne Position: semi reclined   Location: left shoulder    []  Laser with stim  []  Other:  Position:  Location:    []  Vasopneumatic Device    []  Right     []  Left  Pre-treatment girth:  Post-treatment girth:  Measured at (location):  Pressure:       [] lo [] med [] hi   Temperature: [] lo [] med [] hi   [x] Skin assessment post-treatment:  [x]intact []redness- no adverse reaction    []redness  adverse reaction:       30 min Therapeutic Exercise:  [x] See flow sheet : reassessment / FOTO   Rationale: increase ROM and increase strength to improve the patients ability to perform ADLs with improved shoulder/elbow strength and mobility. 24 min Neuromuscular Re-education:  [x]  See flow sheet :   Rationale: increase ROM, increase strength, improve coordination, improve balance and increase proprioception  to improve the patients ability to perform overhead functional lifts with improved scapular stabilization and neutral cervical posture. 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  Focus treatment on WBing stabilization to tolerance after patient co popping and discomfort with 90/90 TB AROM      Patient will continue to benefit from skilled PT services to modify and progress therapeutic interventions, address functional mobility deficits, address ROM deficits, address strength deficits, analyze and address soft tissue restrictions, analyze and cue movement patterns, analyze and modify body mechanics/ergonomics, assess and modify postural abnormalities, address imbalance/dizziness and instruct in home and community integration to attain remaining goals. []  See Plan of Care  [x]  See progress note/recertification 4/8  []  See Discharge Summary         Progress towards goals / Updated goals:  1) Goal: Patient will improve FOTO assessment score to 74 pts in order to indicate improved functional abilities. Status at last note/certification: FOTO 63 pts   Current: goal not met , cont at 63/100     2) Goal: Patient will improve L shoulder flexion to 170 deg or better for overhead reaching  Status at last note/certification: left shoulder flexion 162 deg   Current: goal met - WFL / <180 B     3) Goal: Patient will report worst shoulder pain as 2/10 or less in order to progress toward personal goals. Status at last note/certification:  5/10 pain at worst   Current: goal near met/ progressing at 4/10     4) Goal: Patient will report overall at least 75% improvement in function in order to progress toward premorbid status. Status at last note/certification: 66% improvement  Current: goal met at 80%    5) Goal: Patient will demonstrate 5/5 left shoulder ER MMT for improved overhead lifting.   Status at last note/certification: 4/5  Current: goal progress     PLAN  [x]  Upgrade activities as tolerated     [x]  Continue plan of care  []  Update interventions per flow sheet       []  Discharge due to:_  [x]  Other:_  SEE PN -patient returning to Dr Yolis Berrios today     Jose Barth PT 4/8/2022      Future Appointments   Date Time Provider Karishma Ma   4/8/2022  8:45 AM Janet Munguia, PT [facility]   4/15/2022  8:45 AM Maurita Kehr, PTA [facility]   4/20/2022  1:00 PM [facility] PT ABBIE 2 [facility]   4/22/2022  8:45 AM Janet Munguia, PT [facility]   4/26/2022 12:15 PM [facility] PT ABBIE 2 [facility]   4/29/2022  8:45 AM June Telles [facility]   5/4/2022  4:00 PM Kellee Pappas NP [facility]

## 2022-04-08 NOTE — PROGRESS NOTES
[facility] AT [address]  Phone: [phone] Fax: [phone]  PROGRESS NOTE  Patient Name: Carlitos Castillo : 1985   Treatment/Medical Diagnosis: Left shoulder pain [M25.512]   Referral Source: Stephanie Ledesma MD     Date of Initial Visit: 22 Attended Visits: 9 Missed Visits: 0     SUMMARY OF TREATMENT  Pt is a 39 y.o. Yan Chime presents with L shoulder pain s/p class 3 AC joint seperation. Treatment has consisted of: therapeutic exercise to improved UE strength/mobility; therapeutic activities to improve forward/overhead reach/lift; neuromuscular re-education to improve scapular stability, cervical stability, and UE movement patterns; physical agent/modality for symptom management;manual therapy for symptom relief and improved UE/cervical mobility; patient education to improve symptom management; self Care training; and functional mobility training. CURRENT STATUS  Patient continues to progress well with L shoulder mobility. Strength still limited for return to full PLOF, but progressing. Moderately painful clicking with elevated ER/IR , but good tolerate to stability / isometrics.   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 Status at last note/certification: FOTO 63 pts   Current: goal not met , cont at 63/100     2) Goal: Patient will improve L shoulder flexion to 170 deg or better for overhead reaching  Status at last note/certification: left shoulder flexion 162 deg   Current: goal met - WFL / <180 B     3) Goal: Patient will report worst shoulder pain as 2/10 or less in order to progress toward personal goals. Status at last note/certification:  5/10 pain at worst   Current: goal near met/ progressing at 4/10     4) Goal: Patient will report overall at least 75% improvement in function in order to progress toward premorbid status. Status at last note/certification: 57% improvement  Current: goal met at 80%    5) Goal: Patient will demonstrate 5/5 left shoulder ER MMT for improved overhead lifting. Status at last note/certification: 4/5  Current: goal progress 4+     New Goals to be achieved in __8__  treatments:  1. Patient will improve FOTO assessment score to 74 pts in order to indicate improved functional abilities. 2. Patient will demo 5/5 shoulder ER MMT for improved GH stability for reaching   3. Patient will demo 4+/5 pect major strength for return to all work duties   4. Patient will be IND with DC HEP for progression to self management   RECOMMENDATIONS  Pt to continue with skilled therapy for 1-2x/week for 8 sessions to improve overhead mobility and endurance. If you have any questions/comments please contact us directly at [phone]   Thank you for allowing us to assist in the care of your patient. Therapist Signature: Latia Stanton, PT Date: 4/8/2022   Reporting Period  NA Time: 545M   NOTE TO PHYSICIAN:  PLEASE COMPLETE THE ORDERS BELOW AND FAX TO   [facility]: [phone].   If you are unable to process this request in 24 hours please contact our office: [phone].  ___ I have read the above report and request that my patient continue as recommended.   ___ I have read the above report and request that my patient continue therapy with the following changes/special instructions:_________________________________________________________   ___ I have read the above report and request that my patient be discharged from therapy.      Physician Signature:        Date:       Time:                                                        Stephanie Ledesma MD.

## 2022-04-15 ENCOUNTER — HOSPITAL ENCOUNTER (OUTPATIENT)
Dept: PHYSICAL THERAPY | Age: 37
Discharge: HOME | End: 2022-04-15
Payer: COMMERCIAL

## 2022-04-15 PROCEDURE — 97112 NEUROMUSCULAR REEDUCATION: CPT

## 2022-04-15 PROCEDURE — 97110 THERAPEUTIC EXERCISES: CPT

## 2022-04-15 NOTE — PROGRESS NOTES
PT DAILY TREATMENT NOTE     Patient Name: Ivy Calvert  Date:4/15/2022  : 1985  [x]  Patient  Verified  Payor: BLUE CROSS / Plan: Ifeoma Johnston [id number] PPO / Product Type: PPO /    In time: 8:53 am             Out time: 9:41 am  Total Treatment Time (min): 48  Visit #: 1 of 8    Medicare/BCBS Only   Total Timed Codes (min): 38 1:1 Treatment Time: 38       Treatment Area: Left shoulder pain [M25.512]    SUBJECTIVE  Pain Level (0-10 scale): 1  Any medication changes, allergies to medications, adverse drug reactions, diagnosis change, or new procedure performed?: [x] No    [] Yes (see summary sheet for update)  Subjective functional status/changes:   [] No changes reported  \"The doctor pretty much said that's as good as it's gonna get. I tried downward dog the other day and was careful about the movement into the pose and out and it was fine. I have most pain with the rotational movements. \"    OBJECTIVE    Modality rationale: decrease pain to improve the patients ability to relax and sleep following therapy session to improve restorative sleep patterns.     Min Type Additional Details    [x] Estim:  []Unatt       []IFC  []Premod                        []Other:  []w/ice   []w/heat  Position:   Location:     [] Estim: []Att    []TENS instruct  []NMES                    []Other:  []w/US   []w/ice   []w/heat  Position:  Location:    []  Traction: [] Cervical       []Lumbar                       [] Prone          []Supine                       []Intermittent   []Continuous Lbs:  [] before manual  [] after manual    []  Ultrasound: []Continuous   [] Pulsed                           []1MHz   []3MHz W/cm2:  Location:    []  Iontophoresis with dexamethasone         Location: [] Take home patch   [] In clinic   10 [x]  Ice     []  Heat Position: prone  Location: left shoulder    []  Laser with stim  []  Other:  Position:  Location:    []  Vasopneumatic Device    []  Right     []  Left  Pre-treatment girth:  Post-treatment girth:  Measured at (location):  Pressure:       [] lo [] med [] hi   Temperature: [] lo [] med [] hi   [x] Skin assessment post-treatment:  [x]intact []redness- no adverse reaction    []redness  adverse reaction:     10 min Therapeutic Exercise:  [x] See flow sheet:   Rationale: increase ROM and increase strength to improve the patients ability to perform ADLs with improved shoulder/elbow strength and mobility. 28 min Neuromuscular Re-education:  [x]  See flow sheet: added bear walks (UEs with elbows extended, LEs with knees flexed - patient moves a certain distances forward and backwards focusing on scapular setting)   Rationale: increase ROM, increase strength, improve coordination, improve balance and increase proprioception  to improve the patients ability to perform overhead functional lifts with improved scapular stabilization and neutral cervical posture. WithRX Patient Education: [x] Review HEP     Other Objective/Functional Measures:       Pain Level (0-10 scale) post treatment: 0    ASSESSMENT/Changes in Function:   Patient continues to demonstrate improved tolerance to closed kinetic-chain strengthening as opposed to opened kinetics. Good response to bear walks with patient challenged appropriately in clinic today. Patient will continue to benefit from skilled PT services to modify and progress therapeutic interventions, address functional mobility deficits, address ROM deficits, address strength deficits, analyze and address soft tissue restrictions, analyze and cue movement patterns, analyze and modify body mechanics/ergonomics, assess and modify postural abnormalities, address imbalance/dizziness and instruct in home and community integration to attain remaining goals. []  See Plan of Care  [x]  See progress note/recertification 4/8  []  See Discharge Summary         Progress towards goals / Updated goals:  1.  Patient will improve FOTO assessment score to 74 pts in order to indicate improved functional abilities. 2. Patient will demo 5/5 shoulder ER MMT for improved GH stability for reaching   3. Patient will demo 4+/5 pect major strength for return to all work duties   4.   Patient will be IND with DC HEP for progression to self management     PLAN  [x]  Upgrade activities as tolerated     [x]  Continue plan of care  []  Update interventions per flow sheet       []  Discharge due to:_  []  Other:_    Vaibhav Ching PTA 4/15/2022      Future Appointments   Date Time Provider Karishma Ma   4/20/2022  1:00 PM [facility] PT GH[facility] 2 [facility]   4/22/2022  8:45 AM Janet Munguia, PT [facility]   4/26/2022 12:15 PM [facility] PT GHENT 2 [facility]   4/29/2022  8:45 AM June Telles [facility]   5/4/2022  4:00 PM YESENIA Davis BS [facility]

## 2022-04-20 ENCOUNTER — HOSPITAL ENCOUNTER (OUTPATIENT)
Dept: PHYSICAL THERAPY | Age: 37
Discharge: HOME | End: 2022-04-20
Payer: COMMERCIAL

## 2022-04-20 PROCEDURE — 97112 NEUROMUSCULAR REEDUCATION: CPT

## 2022-04-20 PROCEDURE — 97110 THERAPEUTIC EXERCISES: CPT

## 2022-04-20 NOTE — PROGRESS NOTES
PT DAILY TREATMENT NOTE     Patient Name: Fadi Schultz  Date:2022  : 1985  [x]  Patient  Verified  Payor: BLUE CROSS / Plan: [facility] PPO / Product Type: PPO /    In time:1307            Out time: 1400   Total Treatment Time (min): 48   Visit #: 2 of 8    Medicare/BCBS Only   Total Timed Codes (min): 43 1:1 Treatment Time: 43        Treatment Area: Left shoulder pain [M25.512]    SUBJECTIVE  Pain Level (0-10 scale):1  Any medication changes, allergies to medications, adverse drug reactions, diagnosis change, or new procedure performed?: [x] No    [] Yes (see summary sheet for update)  Subjective functional status/changes:   [] No changes reported  Pt has been back to full yoga, riding his bike to work. Pt notes that he has not had any complications other than \"soreness\" and states that he was able to lay on his L side x 15 min with minimal soreness. OBJECTIVE    Modality rationale: decrease pain to improve the patients ability to relax and sleep following therapy session to improve restorative sleep patterns.     Min Type Additional Details    [x] Estim:  []Unatt       []IFC  []Premod                        []Other:  []w/ice   []w/heat  Position:   Location:     [] Estim: []Att    []TENS instruct  []NMES                    []Other:  []w/US   []w/ice   []w/heat  Position:  Location:    []  Traction: [] Cervical       []Lumbar                       [] Prone          []Supine                       []Intermittent   []Continuous Lbs:  [] before manual  [] after manual    []  Ultrasound: []Continuous   [] Pulsed                           []1MHz   []3MHz W/cm2:  Location:    []  Iontophoresis with dexamethasone         Location: [] Take home patch   [] In clinic   10 [x]  Ice     []  Heat Position: prone  Location: left shoulder, 2 layers between skin and ice    []  Laser with stim  []  Other:  Position:  Location:    []  Vasopneumatic Device    []  Right     [] Left  Pre-treatment girth:  Post-treatment girth:  Measured at (location):  Pressure:       [] lo [] med [] hi   Temperature: [] lo [] med [] hi   [x] Skin assessment post-treatment:  [x]intact []redness- no adverse reaction    []redness  adverse reaction:     13 min Therapeutic Exercise:  [x] See flow sheet:   Rationale: increase ROM and increase strength to improve the patients ability to perform ADLs with improved shoulder/elbow strength and mobility. 30 min Neuromuscular Re-education:  [x]  See flow sheet: added lateral bear walks, body blade IR/ER, flex/ext, plank w/ cone taps, BOSU ball full plank CW/CCW rotations all w/ emphasis on scap setting and GHJ weight bearing   Rationale: increase ROM, increase strength, improve coordination, improve balance and increase proprioception  to improve the patients ability to perform overhead functional lifts with improved scapular stabilization and neutral cervical posture. WithRX Patient Education: [x] Review HEP and educated pt on potential for DOMs w/ activity progression. Other Objective/Functional Measures:   + added side planks 2x15 sec, quadruped clocks w/ red TB, BOSU clockwise and counter clockwise in full plank position  + progressed: plank shoulder taps to shoulder reaches, increased reps of prone I,T,Y activity    Pain Level (0-10 scale) post treatment: 0/10     ASSESSMENT/Changes in Function:   Addressed strengthening of the shoulder and the periscapular musculature w/ progressive challenges in the weight bearing position. Pt w/ good recall of scap setting, noting most difficulty w/ side plank additions and body blade IR/ER coordinated movement patterns. Pt able to correctly perform all tasks as instructed demo increased kinesthetic awareness. No reported increased pain noted post session, citing muscle fatigue only.      Patient will continue to benefit from skilled PT services to modify and progress therapeutic interventions, address functional mobility deficits, address ROM deficits, address strength deficits, analyze and address soft tissue restrictions, analyze and cue movement patterns, analyze and modify body mechanics/ergonomics, assess and modify postural abnormalities, address imbalance/dizziness and instruct in home and community integration to attain remaining goals. []  See Plan of Care  []  See progress note/recertification 4/8  []  See Discharge Summary         Progress towards goals / Updated goals:  1. Patient will improve FOTO assessment score to 74 pts in order to indicate improved functional abilities. 2. Patient will demo 5/5 shoulder ER MMT for improved GH stability for reaching   3. Patient will demo 4+/5 pect major strength for return to all work duties   Current: 4/20/22: addressing scap setting and periscapular/GHJ strength in the weight bearing position   4.   Patient will be IND with DC HEP for progression to self management   Current: 4/20/22: Pt w/ good recall/demo of activity performance and technique throughout session    PLAN  [x]  Upgrade activities as tolerated     [x]  Continue plan of care  []  Update interventions per flow sheet       []  Discharge due to:_  []  Other:_    [facility] 4/20/2022      Future Appointments   Date Time Provider Karishma Ma   4/20/2022  1:00 PM [facility]   4/22/2022  8:45 AM Mabeline Heading., PT [facility]   4/26/2022 12:15 PM [facility] PT [facility]   4/29/2022  8:45 AM Ruthe Anger [facility]   5/4/2022  2:45 PM Ruthe Anger [facility]   5/4/2022  4:00 PM YESENIA Us BS AMB   5/6/2022  8:45 AM [facility] PT [facility]   5/9/2022  9:15 AM [facility] PT [facility]   5/11/2022  2:00 [facility]

## 2022-04-22 ENCOUNTER — HOSPITAL ENCOUNTER (OUTPATIENT)
Dept: PHYSICAL THERAPY | Age: 37
Discharge: HOME | End: 2022-04-22
Payer: COMMERCIAL

## 2022-04-22 PROCEDURE — 97110 THERAPEUTIC EXERCISES: CPT

## 2022-04-22 PROCEDURE — 97112 NEUROMUSCULAR REEDUCATION: CPT

## 2022-04-22 NOTE — PROGRESS NOTES
PT DAILY TREATMENT NOTE     Patient Name: Bony Dear  Date:2022  : 1985  [x]  Patient  Verified  Payor: BLUE CROSS / Plan: [id number] PPO / Product Type: PPO /    In time:8:37          Out time: 9:40  Total Treatment Time (min): 63  Visit #: 3 of 8    Medicare/BCBS Only   Total Timed Codes (min): 53 1:1 Treatment Time: 53       Treatment Area: Left shoulder pain [M25.512]    SUBJECTIVE  Pain Level (0-10 scale): 2  Any medication changes, allergies to medications, adverse drug reactions, diagnosis change, or new procedure performed?: [x] No    [] Yes (see summary sheet for update)  Subjective functional status/changes:   [] No changes reported  Pt reports he was sore in his shoulder muscles after the last visit, but its fine today. He is ready to work on continued strengthening. OBJECTIVE    Modality rationale: decrease pain to improve the patients ability to relax and sleep following therapy session to improve restorative sleep patterns.     Min Type Additional Details    [x] Estim:  []Unatt       []IFC  []Premod                        []Other:  []w/ice   []w/heat  Position:   Location:     [] Estim: []Att    []TENS instruct  []NMES                    []Other:  []w/US   []w/ice   []w/heat  Position:  Location:    []  Traction: [] Cervical       []Lumbar                       [] Prone          []Supine                       []Intermittent   []Continuous Lbs:  [] before manual  [] after manual    []  Ultrasound: []Continuous   [] Pulsed                           []1MHz   []3MHz W/cm2:  Location:    []  Iontophoresis with dexamethasone         Location: [] Take home patch   [] In clinic   10 []  Ice     [x]  Heat Position: supine   Location: left shoulder,     []  Laser with stim  []  Other:  Position:  Location:    []  Vasopneumatic Device    []  Right     []  Left  Pre-treatment girth:  Post-treatment girth:  Measured at (location):  Pressure:       [] lo [] med [] hi Temperature: [] lo [] med [] hi   [x] Skin assessment post-treatment:  [x]intact []redness- no adverse reaction    []redness  adverse reaction:     23 min Therapeutic Exercise:  [x] See flow sheet:   Rationale: increase ROM and increase strength to improve the patients ability to perform ADLs with improved shoulder/elbow strength and mobility. 30 min Neuromuscular Re-education:  [x]  See flow sheet: added lateral bear walks, body blade IR/ER, flex/ext, plank w/ cone taps, BOSU ball full plank CW/CCW rotations all w/ emphasis on scap setting and GHJ weight bearing   Rationale: increase ROM, increase strength, improve coordination, improve balance and increase proprioception  to improve the patients ability to perform overhead functional lifts with improved scapular stabilization and neutral cervical posture. WithRX Patient Education: [x] Review HEP and educated pt on potential for DOMs w/ activity progression. Other Objective/Functional Measures:  Performed modified side planks with rotation, added down dog to dolphin transitions, stationary  carry transitions, shoulder flexion bounces    Pain Level (0-10 scale) post treatment: 0/10     ASSESSMENT/Changes in Function:   Pt was most challenged with down dog to dolphin pose transition reporting fatigue in triceps. Focus of tx today was on improving dynamic shoulder stability and strength while transitioning from one movement plane to another. Pt reported no significant inc in sx with any exercise today, only mm fatigue. Pt was able to progress to 1lb weight with ITY evidencing improving strength.    Patient will continue to benefit from skilled PT services to modify and progress therapeutic interventions, address functional mobility deficits, address ROM deficits, address strength deficits, analyze and address soft tissue restrictions, analyze and cue movement patterns, analyze and modify body mechanics/ergonomics, assess and modify postural abnormalities, address imbalance/dizziness and instruct in home and community integration to attain remaining goals. []  See Plan of Care  []  See progress note/recertification 4/8  []  See Discharge Summary         Progress towards goals / Updated goals:  1. Patient will improve FOTO assessment score to 74 pts in order to indicate improved functional abilities. 2. Patient will demo 5/5 shoulder ER MMT for improved GH stability for reaching   3. Patient will demo 4+/5 pect major strength for return to all work duties   Current: 4/20/22: addressing scap setting and periscapular/GHJ strength in the weight bearing position   4.   Patient will be IND with DC HEP for progression to self management   Current: 4/20/22: Pt w/ good recall/demo of activity performance and technique throughout session updated today 4/22/22    PLAN  [x]  Upgrade activities as tolerated     [x]  Continue plan of care  []  Update interventions per flow sheet       []  Discharge due to:_  []  Other:_    Saratogatiara Blackburn 4/22/2022      Future Appointments   Date Time Provider Karishma Ma   4/22/2022  8:45 AM Carol Velazquez [facility]   4/26/2022 12:15 PM [facility] PT [place] 2 [facility]   4/29/2022  8:45 AM Su Hess [facility]   5/4/2022  2:45 PM Su Hess [facility]   5/4/2022  4:00 PM Valentina Robles NP [facility] AMB   5/6/2022  8:45 AM [facility] PT [facility] 2 [facility]   5/9/2022  9:15 AM [facility] PT [place]   5/11/2022  2:00 PM Su Hess [facility]

## 2022-04-26 ENCOUNTER — HOSPITAL ENCOUNTER (OUTPATIENT)
Dept: PHYSICAL THERAPY | Age: 37
Discharge: HOME | End: 2022-04-26
Payer: COMMERCIAL

## 2022-04-26 PROCEDURE — 97112 NEUROMUSCULAR REEDUCATION: CPT

## 2022-04-26 PROCEDURE — 97110 THERAPEUTIC EXERCISES: CPT

## 2022-04-26 NOTE — PROGRESS NOTES
PT DAILY TREATMENT NOTE     Patient Name: Yomi Cruz  Date:2022  : 1985  [x]  Patient  Verified  Payor: BLUE CROSS / Plan: [id number] PPO / Product Type: PPO /    In time: 330       Out time: 433  Total Treatment Time (min): 63  Visit #: 4 of 8    Medicare/BCBS Only   Total Timed Codes (min): 53 1:1 Treatment Time: 53       Treatment Area: Left shoulder pain [M25.512]    SUBJECTIVE  Pain Level (0-10 scale):0-1/10  Any medication changes, allergies to medications, adverse drug reactions, diagnosis change, or new procedure performed?: [x] No    [] Yes (see summary sheet for update)  Subjective functional status/changes:   [] No changes reported  Pt reports he is progressing very well overall. OBJECTIVE    Modality rationale: decrease pain to improve the patients ability to relax and sleep following therapy session to improve restorative sleep patterns.     Min Type Additional Details    [x] Estim:  []Unatt       []IFC  []Premod                        []Other:  []w/ice   []w/heat  Position:   Location:     [] Estim: []Att    []TENS instruct  []NMES                    []Other:  []w/US   []w/ice   []w/heat  Position:  Location:    []  Traction: [] Cervical       []Lumbar                       [] Prone          []Supine                       []Intermittent   []Continuous Lbs:  [] before manual  [] after manual    []  Ultrasound: []Continuous   [] Pulsed                           []1MHz   []3MHz W/cm2:  Location:    []  Iontophoresis with dexamethasone         Location: [] Take home patch   [] In clinic   10 [x]  Ice     []  Heat Position: semi reclined    Location: left shoulder,     []  Laser with stim  []  Other:  Position:  Location:    []  Vasopneumatic Device    []  Right     []  Left  Pre-treatment girth:  Post-treatment girth:  Measured at (location):  Pressure:       [] lo [] med [] hi   Temperature: [] lo [] med [] hi   [x] Skin assessment post-treatment:  [x]intact []redness- no adverse reaction    []redness  adverse reaction:     25 min Therapeutic Exercise:  [x] See flow sheet:   Rationale: increase ROM and increase strength to improve the patients ability to perform ADLs with improved shoulder/elbow strength and mobility. 28 min Neuromuscular Re-education:  [x]  See flow sheet:    Rationale: increase ROM, increase strength, improve coordination, improve balance and increase proprioception  to improve the patients ability to perform overhead functional lifts with improved scapular stabilization and neutral cervical posture. With RX Patient Education: [x] Review HEP , DOMS , discussed POC and scheduling      Other Objective/Functional Measures:  Improvement: controlled movement, climbing rope , work duties, road bike 5 miles    Deficits: sudden movements, repetitive/ heavy  work duties     Pain Level (0-10 scale) post treatment: 0/10     ASSESSMENT/Changes in Function:   Pt reports he was able to lower his 50# son from a tree from elevated position. Fear avoidance behaviors have significantly improved and patient is testing his shoulder abilities. Discuss option of decreasing frequency and patient comfortable with that - will decrease to 1x per week. Excellent tolerance to progression but challenged by [facility] body blade. Patient will continue to benefit from skilled PT services to modify and progress therapeutic interventions, address functional mobility deficits, address ROM deficits, address strength deficits, analyze and address soft tissue restrictions, analyze and cue movement patterns, analyze and modify body mechanics/ergonomics, assess and modify postural abnormalities, address imbalance/dizziness and instruct in home and community integration to attain remaining goals. []  See Plan of Care  [x]  See progress note/recertification 4/8  []  See Discharge Summary         Progress towards goals / Updated goals:  1.  Patient will improve FOTO assessment score to 74 pts in order to indicate improved functional abilities. 2. Patient will demo 5/5 shoulder ER MMT for improved GH stability for reaching   Current: goal progressing as evident by progression to [facility]   4/26/2022    3. Patient will demo 4+/5 pect major strength for return to all work duties   Current: 4/20/22: addressing scap setting and periscapular/GHJ strength in the weight bearing position     4.   Patient will be IND with DC HEP for progression to self management   Current: 4/20/22: Pt w/ good recall/demo of activity performance and technique throughout session updated today 4/22/22    PLAN  [x]  Upgrade activities as tolerated     [x]  Continue plan of care  []  Update interventions per flow sheet       []  Discharge due to:_  [x]  Other:_ decreased freq to 1x per week till [address], PT 4/26/2022      Future Appointments   Date Time Provider Karishma Ma   4/26/2022  3:30 PM Teddy Macias, PT [facility]   4/29/2022  8:45 AM Tatiana Blush [facility]   5/4/2022  2:45 PM Tatiana Blush [facility]   5/4/2022  4:00 PM Vikki Levy, NP [facility] AMB   5/6/2022  8:45 AM Teddy Macias, PT [facility]   5/9/2022  9:15 AM [facility] PT [facility] 2 [facility]   5/11/2022  2:00 PM Tatiana Blush [facility]

## 2022-04-29 ENCOUNTER — APPOINTMENT (OUTPATIENT)
Dept: PHYSICAL THERAPY | Age: 37
End: 2022-04-29
Payer: COMMERCIAL

## 2022-05-04 ENCOUNTER — APPOINTMENT (OUTPATIENT)
Dept: PHYSICAL THERAPY | Age: 37
End: 2022-05-04
Payer: COMMERCIAL

## 2022-05-06 ENCOUNTER — APPOINTMENT (OUTPATIENT)
Dept: PHYSICAL THERAPY | Age: 37
End: 2022-05-06
Payer: COMMERCIAL

## 2022-05-09 ENCOUNTER — HOSPITAL ENCOUNTER (OUTPATIENT)
Dept: PHYSICAL THERAPY | Age: 37
Discharge: HOME | End: 2022-05-09
Payer: COMMERCIAL

## 2022-05-09 PROCEDURE — 97112 NEUROMUSCULAR REEDUCATION: CPT

## 2022-05-09 PROCEDURE — 97110 THERAPEUTIC EXERCISES: CPT

## 2022-05-09 NOTE — PROGRESS NOTES
[facility] AT [address]  Phone: [phone] Fax: [phone]  PROGRESS NOTE  Patient Name: Fadi Schultz : 1985   Treatment/Medical Diagnosis: Left shoulder pain [M25.512]   Referral Source: Abbe Gitelman, MD     Date of Initial Visit: 22 Attended Visits: 15 Missed Visits: 2      SUMMARY OF TREATMENT  Pt is a 39 y.o. [employer] presents with L shoulder pain s/p class 3 AC joint seperation. Treatment has consisted of: therapeutic exercise to improved UE strength/mobility; therapeutic activities to improve forward/overhead reach/lift; neuromuscular re-education to improve scapular stability, cervical stability, and UE movement patterns; physical agent/modality for symptom management;manual therapy for symptom relief and improved UE/cervical mobility; patient education to improve symptom management; self Care training; and functional mobility training. CURRENT STATUS  Pt has been seen for a total of 15 therapy sessions and has made great progress towards all goals. Pt is compliant w/ HEP, has returned to working in the yard, riding his bike, and participating in some yoga. Pt notes intermittent soreness and reports that he is ready to d/c to his HEP. All goals met at this time.      Other Objective/Functional Measures:  Pain:  Best: 0/10,  at worst 3/10, av/10  FOTO: 87/100  Subjective % Improvement : 95%  Objective:   L shoulder AROM WFL throughout - co end range tightness, lat tightness with ER   L shoulder strength flexion : 5, ABD 5 without  pain , 5/5 0 deg IR/ER   Postural strength MT - 5, LT 5/5 \"pinching\"   Pect strength 5/5   Sling use :  NO CHANGE SINCE LAST PN; DC'd and no taping  Observation - visibile separation      Improvement: controlled movement, climbing rope , work duties (limited, will do mostly in the fall), road bike 5 miles    Deficits: sudden movements, repetitive/ heavy work duties Progress towards goals / Updated goals:  1. Patient will improve FOTO assessment score to 74 pts in order to indicate improved functional abilities. Current: 5/9/22: goal met. 87/100  2. Patient will demo 5/5 shoulder ER MMT for improved [address] stability for reaching   Current: 5/9/22: goal met: 5/5  3.  Patient will demo 4+/5 pect major strength for return to all work duties   Current: 5/9/22: goal met: 5/5  4.  Patient will be IND with DC HEP for progression to self management   Current:5/9/22: goal met, pt defers need for updated HEP    RECOMMENDATIONS  Pt to be d/c from skilled PT services at this time 2/2 current program completed, all goals met. Pt to d/c to comprehensive HEP. If you have any questions/comments please contact us directly at [phone]   Thank you for allowing us to assist in the care of your patient. Therapist Signature: [facility] ROXANN [facility] Date: 5/9/2022   Reporting Period  NA Time: 1005   NOTE TO PHYSICIAN:  PLEASE COMPLETE THE ORDERS BELOW AND FAX TO   [facility]: [phone]. If you are unable to process this request in 24 hours please contact our office: [phone].  ___ I have read the above report and request that my patient continue as recommended.   ___ I have read the above report and request that my patient continue therapy with the following changes/special instructions:_________________________________________________________   ___ I have read the above report and request that my patient be discharged from therapy.      Physician Signature:        Date:       Time:                                                        Katy Shukla MD.

## 2022-05-09 NOTE — PROGRESS NOTES
Physical Therapy Discharge Instructions      [facility] [address] [facility]  [phone] fax      Patient: Carol Hart  : 1985      Continue Home Exercise Program 1-2 times per day for 4-6 weeks, then decrease to 3 times per week      Continue with    [x] Ice  as needed for pain relief     [] Heat           Follow up with MD:     [] Upon completion of therapy     [x] As needed      Recommendations:     [x]   Return to activity with home program    []   Return to activity with the following modifications:       []Post Rehab Program    []Join Independent aquatic program     []Return to/join local gym        Additional Comments: Thank you for choosing [facility]. It has been a pleasure working with you. Please do not hesitate to reach out if you have any questions or concerns.        [facility] Manjit Carreon 2022 9:36 AM

## 2022-05-09 NOTE — PROGRESS NOTES
PT DAILY TREATMENT NOTE     Patient Name: Merwin Riedel  Date:2022  : 1985  [x]  Patient  Verified  Payor: BLUE CROSS / Plan: Ifeoma Johnston [id number] PPO / Product Type: PPO /    In time: 910    Out time:100  Total Treatment Time (min): 47  Visit #: 5 of 8    Medicare/BCBS Only   Total Timed Codes (min):44 1:1 Treatment Time: 44       Treatment Area: Left shoulder pain [M25.512]    SUBJECTIVE  Pain Level (0-10 scale):1 /10 \"sore\"  Any medication changes, allergies to medications, adverse drug reactions, diagnosis change, or new procedure performed?: [x] No    [] Yes (see summary sheet for update)  Subjective functional status/changes:   [] No changes reported  Pt reports that he was sore following last session \"muscles\", denied any adverse responses otherwise. Pt notes that he got back on his skates and has been doing well. Able to lift 50# child out of the tree, [place], without complications, push his broken down car without complications. OBJECTIVE    Modality rationale: decrease pain to improve the patients ability to relax and sleep following therapy session to improve restorative sleep patterns.     Min Type Additional Details    [x] Estim:  []Unatt       []IFC  []Premod                        []Other:  []w/ice   []w/heat  Position:   Location:     [] Estim: []Att    []TENS instruct  []NMES                    []Other:  []w/US   []w/ice   []w/heat  Position:  Location:    []  Traction: [] Cervical       []Lumbar                       [] Prone          []Supine                       []Intermittent   []Continuous Lbs:  [] before manual  [] after manual    []  Ultrasound: []Continuous   [] Pulsed                           []1MHz   []3MHz W/cm2:  Location:    []  Iontophoresis with dexamethasone         Location: [] Take home patch   [] In clinic    10 [x]  Ice     []  Heat Position: prone   Location: left shoulder, 2 layers between skin and ice    []  Laser with stim  []  Other: Position:  Location:    []  Vasopneumatic Device    []  Right     []  Left  Pre-treatment girth:  Post-treatment girth:  Measured at (location):  Pressure:       [] lo [] med [] hi   Temperature: [] lo [] med [] hi   [x] Skin assessment post-treatment:  [x]intact []redness- no adverse reaction    []redness  adverse reaction:     30 min Therapeutic Exercise:  [x] See flow sheet: re-assessment during exercises   Rationale: increase ROM and increase strength to improve the patients ability to perform ADLs with improved shoulder/elbow strength and mobility. 14 min Neuromuscular Re-education:  [x]  See flow sheet:    Rationale: increase ROM, increase strength, improve coordination, improve balance and increase proprioception  to improve the patients ability to perform overhead functional lifts with improved scapular stabilization and neutral cervical posture. With RX Patient Education: [x] Review HEP , discussed POC and d/c at this time to HEP, d/c instructions provided     Other Objective/Functional Measures:  Pain:  Best: 0/10,  at worst 3/10, av/10  FOTO: 87/100  Subjective % Improvement : 95%  Objective:   L shoulder AROM WFL throughout - co end range tightness, lat tightness with ER   L shoulder strength flexion : 5, ABD 5 without  pain , 5/5 0 deg IR/ER   Postural strength MT - 5, LT 5/5 \"pinching\"   Pect strength 5/5   Sling use :  NO CHANGE SINCE LAST PN; DC'd and no taping  Observation - visibile separation      Improvement: controlled movement, climbing rope , work duties (limited, will do mostly in the fall), road bike 5 miles    Deficits: sudden movements, repetitive/ heavy work duties     Pain Level (0-10 scale) post treatment: 0/10 \"just muscles sore\"    ASSESSMENT/Changes in Function:   SEE PN/discharge summary    Pt has made great progress towards all goals and is ready to d/c to comprehensive HEP. Pt denied any need for updated HEP pictures.      []  See Plan of Care  []  See progress note/recertification  [x]  See Discharge Summary         Progress towards goals / Updated goals:  1. Patient will improve FOTO assessment score to 74 pts in order to indicate improved functional abilities. Current: 5/9/22: goal met. 87/100  2. Patient will demo 5/5 shoulder ER MMT for improved [address] stability for reaching   Current: 5/9/22: goal met: 5/5  3. Patient will demo 4+/5 pect major strength for return to all work duties   Current: 5/9/22: goal met: 5/5  4.   Patient will be IND with DC HEP for progression to self management   Current:5/9/22: goal met, pt defers need for updated HEP    PLAN  []  Upgrade activities as tolerated     []  Continue plan of care  []  Update interventions per flow sheet       []  Discharge due to:_  [x]  Other:_d/c to [employer], PT 5/9/2022      Future Appointments   Date Time Provider Karishma Ma   5/9/2022  9:15 AM [facility] 2 [facility]   5/19/2022  8:30 AM Sera Schumacher NP [facility] BS AMB

## 2022-05-11 ENCOUNTER — APPOINTMENT (OUTPATIENT)
Dept: PHYSICAL THERAPY | Age: 37
End: 2022-05-11
Payer: COMMERCIAL

## 2022-05-16 ENCOUNTER — APPOINTMENT (OUTPATIENT)
Dept: PHYSICAL THERAPY | Age: 37
End: 2022-05-16
Payer: COMMERCIAL

## 2022-05-18 ENCOUNTER — APPOINTMENT (OUTPATIENT)
Dept: PHYSICAL THERAPY | Age: 37
End: 2022-05-18
Payer: COMMERCIAL

## 2022-05-19 ENCOUNTER — OFFICE VISIT (OUTPATIENT)
Dept: INTERNAL MEDICINE CLINIC | Age: 37
End: 2022-05-19
Payer: COMMERCIAL

## 2022-05-19 VITALS
RESPIRATION RATE: 20 BRPM | OXYGEN SATURATION: 98 % | DIASTOLIC BLOOD PRESSURE: 79 MMHG | WEIGHT: 176.6 LBS | TEMPERATURE: 98.2 F | SYSTOLIC BLOOD PRESSURE: 119 MMHG | HEIGHT: 71 IN | HEART RATE: 72 BPM | BODY MASS INDEX: 24.72 KG/M2

## 2022-05-19 DIAGNOSIS — Z11.59 NEED FOR HEPATITIS C SCREENING TEST: ICD-10-CM

## 2022-05-19 DIAGNOSIS — F32.A ANXIETY AND DEPRESSION: ICD-10-CM

## 2022-05-19 DIAGNOSIS — F41.9 ANXIETY AND DEPRESSION: ICD-10-CM

## 2022-05-19 DIAGNOSIS — Z00.00 ROUTINE PHYSICAL EXAMINATION: Primary | ICD-10-CM

## 2022-05-19 PROCEDURE — 99395 PREV VISIT EST AGE 18-39: CPT | Performed by: NURSE PRACTITIONER

## 2022-05-19 RX ORDER — ESCITALOPRAM OXALATE 10 MG/1
10 TABLET ORAL DAILY
Qty: 90 TABLET | Refills: 1 | Status: SHIPPED | OUTPATIENT
Start: 2022-05-19

## 2022-05-19 NOTE — PROGRESS NOTES
[address]  Primary Care Office Visit - Complete Physical    Enzo Johnson is a 39 y.o. male presenting for annual physical.  : 1985        Assessment/Plan:     1. Routine physical examination  Labs for baseline   - CBC WITH AUTOMATED DIFF; Future  - METABOLIC PANEL, COMPREHENSIVE; Future  - HEMOGLOBIN A1C WITH EAG; Future  - LIPID PANEL; Future  - T4, FREE; Future  - TSH 3RD GENERATION; Future  - URINALYSIS W/ RFLX MICROSCOPIC; Future  - VITAMIN D, 25 HYDROXY; Future    2. Need for hepatitis C screening test    - HEPATITIS C AB; Future    3. Anxiety and depression  Controlled continue lexapro 10 mg daily   - escitalopram oxalate (LEXAPRO) 10 mg tablet; Take 1 Tablet by mouth daily. Indications: anxiousness associated with depression  Dispense: 90 Tablet; Refill: 1      Follow-up and Dispositions    · Return in about 6 months (around 2022) for anxiety and depression . Orders & Diagnoses associated with This Encounter:         ICD-10-CM ICD-9-CM   1. Routine physical examination  Z00.00 V70.0   2. Need for hepatitis C screening test  Z11.59 V73.89   3. Anxiety and depression  F41.9 300.00    F32. A 311       Orders Placed This Encounter    CBC WITH AUTOMATED DIFF     Standing Status:   Future     Standing Expiration Date:       METABOLIC PANEL, COMPREHENSIVE     Standing Status:   Future     Standing Expiration Date:   2023    HEMOGLOBIN A1C WITH EAG     Standing Status:   Future     Standing Expiration Date:   2023    LIPID PANEL     Standing Status:   Future     Standing Expiration Date:   2023    T4, FREE     Standing Status:   Future     Standing Expiration Date:   2022    TSH 3RD GENERATION     Standing Status:   Future     Standing Expiration Date:   2023    URINALYSIS W/ RFLX MICROSCOPIC     Standing Status:   Future     Standing Expiration Date:   2023    VITAMIN D, 25 HYDROXY     Standing Status:   Future     Standing Expiration Date:   5/20/2023    HEPATITIS C AB     Standing Status:   Future     Standing Expiration Date:   5/20/2023    escitalopram oxalate (LEXAPRO) 10 mg tablet     Sig: Take 1 Tablet by mouth daily. Indications: anxiousness associated with depression     Dispense:  90 Tablet     Refill:  1       Management plan & patient instructions reviewed with Roshan Hernandez, who voiced understanding. This document may have been created with the aid of dictation software. Text may contain errors, particularly phonetic errors. Nikki Yanez NP-C   5/19/2022         Subjective   History:   Roshan Hernandez is a 39 y.o. male presenting for an annual physical.    Anxiety and Depression - controlled with lexapro 10 mg     Past Medical History:   Diagnosis Date    Anxiety and depression     Asthma     Eczema      Past Surgical History:   Procedure Laterality Date    HX WISDOM TEETH EXTRACTION Bilateral       reports that he has never smoked. He has never used smokeless tobacco. He reports current alcohol use of about 8.0 standard drinks of alcohol per week. He reports that he does not use drugs. Social History     Social History Narrative    Not on file     Social History     Tobacco Use   Smoking Status Never Smoker   Smokeless Tobacco Never Used     Family History   Problem Relation Age of Onset    Thyroid Disease Mother     Osteoporosis Mother     No Known Problems Father      Allergies   Allergen Reactions    Sulfa (Sulfonamide Antibiotics) Hives       Problem List:    There are no problems to display for this patient. Medications:     Current Outpatient Medications   Medication Sig    escitalopram oxalate (LEXAPRO) 10 mg tablet Take 1 Tablet by mouth daily. Indications: anxiousness associated with depression    VENTOLIN HFA 90 mcg/actuation inhaler TAKE 2 PUFFS BY INHALATION EVERY FOUR (4) HOURS AS NEEDED FOR WHEEZING. No current facility-administered medications for this visit.        Review of Systems:     Review of Systems   Constitutional: Negative for chills, fever and malaise/fatigue. Eyes: Negative for blurred vision and double vision. Respiratory: Negative for cough, shortness of breath and wheezing. Cardiovascular: Negative for chest pain, palpitations and leg swelling. Gastrointestinal: Negative for abdominal pain, constipation, diarrhea, heartburn, nausea and vomiting. Genitourinary: Positive for frequency. Negative for dysuria and urgency. Skin: Negative for itching and rash. Neurological: Negative for dizziness and headaches. Endo/Heme/Allergies: Negative for polydipsia. Psychiatric/Behavioral: Negative for depression. The patient is not nervous/anxious. Objective   Physical Assessment:   VS:    Visit Vitals  /79   Pulse 72   Temp 98.2 °F (36.8 °C)   Resp 20   Ht 5' 11\" (1.803 m)   Wt 176 lb 9.6 oz (80.1 kg)   SpO2 98%   BMI 24.63 kg/m²         Physical Exam  Vitals and nursing note reviewed. Constitutional:       General: He is not in acute distress. Appearance: Normal appearance. He is not ill-appearing or toxic-appearing. HENT:      Head: Normocephalic and atraumatic. Right Ear: Tympanic membrane, ear canal and external ear normal.      Left Ear: Tympanic membrane, ear canal and external ear normal.      Nose: Nose normal. No congestion. Mouth/Throat:      Mouth: Mucous membranes are moist.      Pharynx: Oropharynx is clear. Eyes:      General: No scleral icterus. Right eye: No discharge. Left eye: No discharge. Extraocular Movements: Extraocular movements intact. Conjunctiva/sclera: Conjunctivae normal.      Pupils: Pupils are equal, round, and reactive to light. Cardiovascular:      Rate and Rhythm: Normal rate and regular rhythm. Pulses: Normal pulses. Heart sounds: Normal heart sounds. Pulmonary:      Effort: Pulmonary effort is normal. No respiratory distress.       Breath sounds: Normal breath sounds. No wheezing. Abdominal:      General: Abdomen is flat. Bowel sounds are normal. There is no distension. Palpations: Abdomen is soft. There is no mass. Tenderness: There is no abdominal tenderness. There is no right CVA tenderness, left CVA tenderness, guarding or rebound. Hernia: No hernia is present. Musculoskeletal:         General: Normal range of motion. Cervical back: Normal range of motion. Right lower leg: No edema. Left lower leg: No edema. Lymphadenopathy:      Cervical: No cervical adenopathy. Skin:     General: Skin is warm and dry. Findings: No lesion or rash. Neurological:      General: No focal deficit present. Mental Status: He is alert and oriented to person, place, and time. Mental status is at baseline. Psychiatric:         Mood and Affect: Mood normal.         Behavior: Behavior normal.         Thought Content: Thought content normal.         Judgment: Judgment normal.         Records Review:         Recent Labs & Imaging:     No results found for this or any previous visit (from the past 12 hour(s)).

## 2022-05-19 NOTE — PROGRESS NOTES
Silverio Yo presents today for   Chief Complaint   Patient presents with    Physical       Is someone accompanying this pt? no    Is the patient using any DME equipment during OV? no    Depression Screening:  3 most recent PHQ Screens 5/19/2022   Little interest or pleasure in doing things Not at all   Feeling down, depressed, irritable, or hopeless Not at all   Total Score PHQ 2 0       Learning Assessment:  Learning Assessment 10/10/2017   PRIMARY LEARNER Patient   HIGHEST LEVEL OF EDUCATION - PRIMARY LEARNER  > 4 YEARS OF COLLEGE   BARRIERS PRIMARY LEARNER NONE   CO-LEARNER CAREGIVER No   PRIMARY LANGUAGE ENGLISH   LEARNER PREFERENCE PRIMARY DEMONSTRATION   ANSWERED BY patient   RELATIONSHIP SELF       Abuse Screening:  No flowsheet data found. Fall Risk  No flowsheet data found. Health Maintenance reviewed and discussed and ordered per Provider. Health Maintenance Due   Topic Date Due    Hepatitis C Screening  Never done    COVID-19 Vaccine (1) Never done   . 1. \"Have you been to the ER, urgent care clinic since your last visit? Hospitalized since your last visit? \" No    2. \"Have you seen or consulted any other health care providers outside of the [address] [place] [address] since your last visit? \" No     3. For patients over 45: Has the patient had a colonoscopy? NA - based on age     If the patient is female:    4. For patients over 40: Has the patient had a mammogram? No    5. For patients over 21: Has the patient had a pap smear?  No

## 2022-05-20 LAB
25(OH)D3+25(OH)D2 SERPL-MCNC: 42.5 NG/ML (ref 30–100)
ALBUMIN SERPL-MCNC: 4.7 G/DL (ref 4–5)
ALBUMIN/GLOB SERPL: 2.6 {RATIO} (ref 1.2–2.2)
ALP SERPL-CCNC: 42 IU/L (ref 44–121)
ALT SERPL-CCNC: 24 IU/L (ref 0–44)
APPEARANCE UR: CLEAR
AST SERPL-CCNC: 19 IU/L (ref 0–40)
BASOPHILS # BLD AUTO: 0 X10E3/UL (ref 0–0.2)
BASOPHILS NFR BLD AUTO: 0 %
BILIRUB SERPL-MCNC: 0.5 MG/DL (ref 0–1.2)
BILIRUB UR QL STRIP: NEGATIVE
BUN SERPL-MCNC: 15 MG/DL (ref 6–20)
BUN/CREAT SERPL: 17 (ref 9–20)
CALCIUM SERPL-MCNC: 9.3 MG/DL (ref 8.7–10.2)
CHLORIDE SERPL-SCNC: 104 MMOL/L (ref 96–106)
CHOLEST SERPL-MCNC: 154 MG/DL (ref 100–199)
CO2 SERPL-SCNC: 22 MMOL/L (ref 20–29)
COLOR UR: YELLOW
CREAT SERPL-MCNC: 0.87 MG/DL (ref 0.76–1.27)
EGFR: 115 ML/MIN/1.73
EOSINOPHIL # BLD AUTO: 0.1 X10E3/UL (ref 0–0.4)
EOSINOPHIL NFR BLD AUTO: 1 %
ERYTHROCYTE [DISTWIDTH] IN BLOOD BY AUTOMATED COUNT: 12.7 % (ref 11.6–15.4)
EST. AVERAGE GLUCOSE BLD GHB EST-MCNC: 94 MG/DL
GLOBULIN SER CALC-MCNC: 1.8 G/DL (ref 1.5–4.5)
GLUCOSE SERPL-MCNC: 82 MG/DL (ref 65–99)
GLUCOSE UR QL STRIP: NEGATIVE
HBA1C MFR BLD: 4.9 % (ref 4.8–5.6)
HCT VFR BLD AUTO: 43.4 % (ref 37.5–51)
HCV AB S/CO SERPL IA: <0.1 S/CO RATIO (ref 0–0.9)
HDLC SERPL-MCNC: 45 MG/DL
HGB BLD-MCNC: 14.7 G/DL (ref 13–17.7)
HGB UR QL STRIP: NEGATIVE
IMM GRANULOCYTES # BLD AUTO: 0 X10E3/UL (ref 0–0.1)
IMM GRANULOCYTES NFR BLD AUTO: 0 %
IMP & REVIEW OF LAB RESULTS: NORMAL
KETONES UR QL STRIP: NEGATIVE
LDLC SERPL CALC-MCNC: 95 MG/DL (ref 0–99)
LEUKOCYTE ESTERASE UR QL STRIP: NEGATIVE
LYMPHOCYTES # BLD AUTO: 1.6 X10E3/UL (ref 0.7–3.1)
LYMPHOCYTES NFR BLD AUTO: 25 %
MCH RBC QN AUTO: 30 PG (ref 26.6–33)
MCHC RBC AUTO-ENTMCNC: 33.9 G/DL (ref 31.5–35.7)
MCV RBC AUTO: 89 FL (ref 79–97)
MICRO URNS: NORMAL
MONOCYTES # BLD AUTO: 0.4 X10E3/UL (ref 0.1–0.9)
MONOCYTES NFR BLD AUTO: 7 %
NEUTROPHILS # BLD AUTO: 4.3 X10E3/UL (ref 1.4–7)
NEUTROPHILS NFR BLD AUTO: 67 %
NITRITE UR QL STRIP: NEGATIVE
PH UR STRIP: 7.5 [PH] (ref 5–7.5)
PLATELET # BLD AUTO: 177 X10E3/UL (ref 150–450)
POTASSIUM SERPL-SCNC: 4.2 MMOL/L (ref 3.5–5.2)
PROT SERPL-MCNC: 6.5 G/DL (ref 6–8.5)
PROT UR QL STRIP: NEGATIVE
RBC # BLD AUTO: 4.9 X10E6/UL (ref 4.14–5.8)
SODIUM SERPL-SCNC: 140 MMOL/L (ref 134–144)
SP GR UR STRIP: 1.01 (ref 1–1.03)
T4 FREE SERPL-MCNC: 1.18 NG/DL (ref 0.82–1.77)
TRIGL SERPL-MCNC: 69 MG/DL (ref 0–149)
TSH SERPL DL<=0.005 MIU/L-ACNC: 1.87 UIU/ML (ref 0.45–4.5)
UROBILINOGEN UR STRIP-MCNC: 0.2 MG/DL (ref 0.2–1)
VLDLC SERPL CALC-MCNC: 14 MG/DL (ref 5–40)
WBC # BLD AUTO: 6.5 X10E3/UL (ref 3.4–10.8)

## 2022-05-23 ENCOUNTER — APPOINTMENT (OUTPATIENT)
Dept: PHYSICAL THERAPY | Age: 37
End: 2022-05-23
Payer: COMMERCIAL

## 2022-05-23 NOTE — PROGRESS NOTES
Results reviewed. Please call patient with results.     Labs are stable or within acceptable ranges - recommend yearly physicals

## 2022-05-25 ENCOUNTER — APPOINTMENT (OUTPATIENT)
Dept: PHYSICAL THERAPY | Age: 37
End: 2022-05-25
Payer: COMMERCIAL

## 2022-07-12 ENCOUNTER — OFFICE VISIT (OUTPATIENT)
Dept: INTERNAL MEDICINE CLINIC | Age: 37
End: 2022-07-12
Payer: COMMERCIAL

## 2022-07-12 VITALS
SYSTOLIC BLOOD PRESSURE: 105 MMHG | OXYGEN SATURATION: 97 % | RESPIRATION RATE: 18 BRPM | HEART RATE: 70 BPM | BODY MASS INDEX: 24.5 KG/M2 | WEIGHT: 175 LBS | TEMPERATURE: 97 F | HEIGHT: 71 IN | DIASTOLIC BLOOD PRESSURE: 69 MMHG

## 2022-07-12 DIAGNOSIS — K42.9 UMBILICAL HERNIA WITHOUT OBSTRUCTION AND WITHOUT GANGRENE: Primary | ICD-10-CM

## 2022-07-12 PROCEDURE — 99213 OFFICE O/P EST LOW 20 MIN: CPT | Performed by: INTERNAL MEDICINE

## 2022-07-12 NOTE — PROGRESS NOTES
1. \"Have you been to the ER, urgent care clinic since your last visit? Hospitalized since your last visit? \" No    2. \"Have you seen or consulted any other health care providers outside of the [address] [place] [address] since your last visit? \" No     3. For patients aged 39-70: Has the patient had a colonoscopy / FIT/ Cologuard? NA - based on age      If the patient is female:    4. For patients aged 41-77: Has the patient had a mammogram within the past 2 years? NA - based on age or sex      11. For patients aged 21-65: Has the patient had a pap smear?  NA - based on age or sex

## 2022-07-12 NOTE — PROGRESS NOTES
.Progress Note    Patient: Genesis Fernandez               Sex: male                  YOB: 1985      Age:  39 y.o.                    HPI:     Genesis Fernandez is a 39 y.o. male who has been seen for  changes in his \"belly button\". He has some discomfort there. Past Medical History:   Diagnosis Date    Anxiety and depression     Asthma     Eczema        Past Surgical History:   Procedure Laterality Date    HX WISDOM TEETH EXTRACTION Bilateral        Family History   Problem Relation Age of Onset    Thyroid Disease Mother     Osteoporosis Mother     No Known Problems Father        Social History     Socioeconomic History    Marital status:    Tobacco Use    Smoking status: Never Smoker    Smokeless tobacco: Never Used   Vaping Use    Vaping Use: Never used   Substance and Sexual Activity    Alcohol use: Yes     Alcohol/week: 8.0 standard drinks     Types: 2 Glasses of wine, 4 Cans of beer, 2 Shots of liquor per week    Drug use: No    Sexual activity: Yes     Birth control/protection: None     Social Determinants of Health     Financial Resource Strain: Medium Risk    Difficulty of Paying Living Expenses: Somewhat hard   Food Insecurity: No Food Insecurity    Worried About Running Out of Food in the Last Year: Never true    Ran Out of Food in the Last Year: Never true         Current Outpatient Medications:     escitalopram oxalate (LEXAPRO) 10 mg tablet, Take 1 Tablet by mouth daily. Indications: anxiousness associated with depression, Disp: 90 Tablet, Rfl: 1    VENTOLIN HFA 90 mcg/actuation inhaler, TAKE 2 PUFFS BY INHALATION EVERY FOUR (4) HOURS AS NEEDED FOR WHEEZING., Disp: 1 Inhaler, Rfl: 0     Allergies   Allergen Reactions    Sulfa (Sulfonamide Antibiotics) Hives       Review of Systems   Gastrointestinal: Negative for constipation, diarrhea, nausea and vomiting.         Physical Exam:      Visit Vitals  /69 (BP 1 Location: Left upper arm, BP Patient Position: Sitting, BP Cuff Size: Adult)   Pulse 70   Temp 97 °F (36.1 °C) (Temporal)   Resp 18   Ht 5' 11\" (1.803 m)   Wt 175 lb (79.4 kg)   SpO2 97%   BMI 24.41 kg/m²       Physical Exam  Abdominal:      Hernia: A hernia is present. Comments: Small umbilical hernia  upper aspect  of the navel    Psychiatric:         Mood and Affect: Mood normal.         Behavior: Behavior normal.          Labs Reviewed:      Assessment/Plan       ICD-10-CM ICD-9-CM    1.  Umbilical hernia without obstruction and without gangrene  K42.9 553.1 REFERRAL TO SURGERY             Gato Nguyen MD

## 2022-07-14 ENCOUNTER — OFFICE VISIT (OUTPATIENT)
Dept: SURGERY | Age: 37
End: 2022-07-14
Payer: COMMERCIAL

## 2022-07-14 VITALS
SYSTOLIC BLOOD PRESSURE: 128 MMHG | DIASTOLIC BLOOD PRESSURE: 83 MMHG | BODY MASS INDEX: 24.5 KG/M2 | TEMPERATURE: 97.2 F | RESPIRATION RATE: 16 BRPM | WEIGHT: 175 LBS | HEART RATE: 61 BPM | HEIGHT: 71 IN | OXYGEN SATURATION: 97 %

## 2022-07-14 DIAGNOSIS — K42.9 UMBILICAL HERNIA WITHOUT OBSTRUCTION AND WITHOUT GANGRENE: Primary | ICD-10-CM

## 2022-07-14 PROCEDURE — 99204 OFFICE O/P NEW MOD 45 MIN: CPT | Performed by: SURGERY

## 2022-07-14 RX ORDER — IBUPROFEN 200 MG
200 TABLET ORAL
COMMUNITY
End: 2022-08-09

## 2022-07-14 NOTE — PROGRESS NOTES
Joann Seo is a 39 y.o. male (: 1985) presenting to address:    Chief Complaint   Patient presents with    New Patient     umbilical hernia       Medication list and allergies have been reviewed with Joann Seo and updated as of today's date. I have gone over all Medical, Surgical and Social History with Joann Seo and updated/added the information accordingly.

## 2022-07-14 NOTE — PROGRESS NOTES
Consult    Patient: Paulino Barrios MRN: [id number]  SSN: [id number]    YOB: 1985  Age: 39 y.o. Sex: male      Subjective:      Paulino Barrios is a 39 y.o. white male who presents in referral for evaluation of a 1 month history of a swelling at the level of his umbilicus which has become increasingly more symptomatic with local achy discomfort especially after heavy lifting at the end of the day. The patient notes no change in the size of the bulge and specifically denies obstructive GI  and pulmonary symptomatology  Allergies: Sulfa-hives  Current medications: See medication list  Past medical history:  1. Allergic rhinitis  2. Reactive airway disease  3. Eczema  4. Anxiety/depression  Past surgical history:  1. [place] tooth extractions age 25  Social history:  Tobacco: None  Alcohol approximately 1 ounce on a weekly basis  Mother 67- cutaneous  malignancies  F Father 70-hypertension  Brother 30-healthy amily history: Allergies   Allergen Reactions    Sulfa (Sulfonamide Antibiotics) Hives     Current Outpatient Medications   Medication Sig Dispense Refill    ibuprofen (MOTRIN) 200 mg tablet Take 200 mg by mouth.  escitalopram oxalate (LEXAPRO) 10 mg tablet Take 1 Tablet by mouth daily. Indications: anxiousness associated with depression 90 Tablet 1    VENTOLIN HFA 90 mcg/actuation inhaler TAKE 2 PUFFS BY INHALATION EVERY FOUR (4) HOURS AS NEEDED FOR WHEEZING. 1 Inhaler 0     Past Medical History:   Diagnosis Date    Anxiety and depression     Asthma     Eczema      Past Surgical History:   Procedure Laterality Date    HX WISDOM TEETH EXTRACTION Bilateral       Social History     Tobacco Use    Smoking status: Never Smoker    Smokeless tobacco: Never Used   Substance Use Topics    Alcohol use:  Yes     Alcohol/week: 8.0 standard drinks     Types: 2 Glasses of wine, 4 Cans of beer, 2 Shots of liquor per week     Family History   Problem Relation Age of Onset    Thyroid Disease Mother     Osteoporosis Mother     No Known Problems Father            Review of Systems:    General: Denies fevers, chills, night sweats, fatigue, weight loss, or weight gain. HEENT: Denies changes in auditory or visual acuity, recurrent pharyngitis, epistaxis, chronic rhinorrhea, vertigo    Respiratory: Denies increasing shortness of breath, productive cough, hemoptysis    Cardiac: Denies known history of cardiac disease, heart murmur, palpitations    GI: Denies dysphagia, recurrent emesis, hematemesis, changes in bowel habits, hematochezia, melena    : Denies hematuria frequency urgency dysuria    Musculoskeletal: Denies fractures, dislocations    Neurologic: Denies history of CVA, paralysis paresthesias, recurrent cephalgia, seizures    Endocrine: Denies polyuria, polydipsia, polyphagia, heat and cold intolerance    Lymph/heme: Denies a history of malignancy, anemia, bruising, blood transfusions    Integumentary: Negative for dermatitis     Objective:     Vitals:    07/14/22 1003   BP: 128/83   Pulse: 61   Resp: 16   Temp: 97.2 °F (36.2 °C)   SpO2: 97%   Weight: 79.4 kg (175 lb)   Height: 5' 11\" (1.803 m)        General: no acute distress, nontoxic in appearance. Head: Normocephalic, atraumatic  Mouth: Clear, no overt lesions, oral mucosa is pink and moist.  Neck: Supple, no masses, no adenopathy or carotid bruits, trachea midline  Resp: Clear to auscultation bilaterally, no wheezing, rhonchi, or rales, excursions normal and symmetrical.  Cardio: Regular rate and rhythm, no murmurs, clicks, gallops, or rubs.    Abdomen: soft, nontender, nondistended, normoactive bowel sounds, easily reducible umbilical hernia with a 3 cm subcutaneous component and a 1 cm fascial defect  Extremities: Warm, well perfused, no tenderness or swelling, normal gait/station, without edema or varicosities  Neuro: Sensation and strength grossly intact and symmetrical.  Psych: Alert and oriented to person, place, and time.          Assessment:     Umbilical hernia      Plan:     The diagnosis, potential management strategies including nonoperative/observational versus surgical intervention were discussed the risk benefits of operating versus not potential alternatives were addressed.   The patient noted his understanding wishes to consider the options and will notify us of his decision as to whether he desires to pursue surgical intervention    Signed By: Candelaria Tovar DO     July 14, 2022

## 2022-08-04 NOTE — PERIOP NOTES
PRE-SURGICAL INSTRUCTIONS        Patient's Name:  Omer SILVESTRE Date:  8/4/2022            Covid Testing Date and Time:    Surgery Date:  8/9/2022                Do NOT eat or drink anything, including candy, gum, or ice chips after midnight on 8/8/2022, unless you have specific instructions from your surgeon or anesthesia provider to do so. You may brush your teeth before coming to the hospital.  No smoking 24 hours prior to the day of surgery. No alcohol 24 hours prior to the day of surgery. No recreational drugs for one week prior to the day of surgery. Leave all valuables, including money/purse, at home. Remove all jewelry, nail polish, acrylic nails, and makeup (including mascara); no lotions powders, deodorant, or perfume/cologne/after shave on the skin. Follow instruction for Hibiclens washes and CHG wipes from surgeon's office. Glasses/contact lenses and dentures may be worn to the hospital.  They will be removed prior to surgery. Call your doctor if symptoms of a cold or illness develop within 24-48 hours prior to your surgery. 11.  If you are having an outpatient procedure, please make arrangements for a responsible ADULT TO [address] [place] [address] and stay with you for 24 hours after your surgery. 12. ONE VISITOR in the hospital at this time for outpatient procedures. Exceptions may be made for surgical admissions, per nursing unit guidelines      Special Instructions:      Bring list of CURRENT medications. Bring inhaler. Bring CPAP machine. Bring any pertinent legal medical records. Take these medications the morning of surgery with a sip of water:  per MD  Follow physician instructions about insulin. Follow physician instructions about stopping anticoagulants. Complete bowel prep per MD instructions. On the day of surgery, come in the main entrance of [facility]. Let the  at the desk know you are there for surgery.   A staff member will come escort you to the surgical area on the second floor. If you have any questions or concerns, please do not hesitate to call:     (Prior to the day of surgery) PAT department:  [phone]   (Day of surgery) Pre-Op department:  [phone]    These surgical instructions were reviewed with patient during the PAT phone call.

## 2022-08-08 ENCOUNTER — ANESTHESIA EVENT (OUTPATIENT)
Dept: SURGERY | Age: 37
End: 2022-08-08
Payer: COMMERCIAL

## 2022-08-09 ENCOUNTER — ANESTHESIA (OUTPATIENT)
Dept: SURGERY | Age: 37
End: 2022-08-09
Payer: COMMERCIAL

## 2022-08-09 ENCOUNTER — HOSPITAL ENCOUNTER (OUTPATIENT)
Age: 37
Discharge: HOME | End: 2022-08-09
Attending: SURGERY | Admitting: SURGERY
Payer: COMMERCIAL

## 2022-08-09 ENCOUNTER — TELEPHONE (OUTPATIENT)
Dept: SURGERY | Age: 37
End: 2022-08-09

## 2022-08-09 VITALS
RESPIRATION RATE: 14 BRPM | DIASTOLIC BLOOD PRESSURE: 72 MMHG | SYSTOLIC BLOOD PRESSURE: 119 MMHG | BODY MASS INDEX: 24.77 KG/M2 | TEMPERATURE: 97.3 F | WEIGHT: 176.9 LBS | OXYGEN SATURATION: 98 % | HEART RATE: 53 BPM | HEIGHT: 71 IN

## 2022-08-09 DIAGNOSIS — K42.9 UMBILICAL HERNIA WITHOUT OBSTRUCTION OR GANGRENE: ICD-10-CM

## 2022-08-09 DIAGNOSIS — K42.9 UMBILICAL HERNIA WITHOUT OBSTRUCTION OR GANGRENE: Primary | ICD-10-CM

## 2022-08-09 DIAGNOSIS — G89.18 POST-OP PAIN: Primary | ICD-10-CM

## 2022-08-09 DIAGNOSIS — Z98.890 H/O HERNIA REPAIR: ICD-10-CM

## 2022-08-09 DIAGNOSIS — Z87.19 H/O HERNIA REPAIR: ICD-10-CM

## 2022-08-09 LAB
AMPHET UR QL SCN: NEGATIVE
BARBITURATES UR QL SCN: NEGATIVE
BENZODIAZ UR QL: NEGATIVE
CANNABINOIDS UR QL SCN: NEGATIVE
COCAINE UR QL SCN: NEGATIVE
HDSCOM,HDSCOM: NORMAL
METHADONE UR QL: NEGATIVE
OPIATES UR QL: NEGATIVE
PCP UR QL: NEGATIVE

## 2022-08-09 PROCEDURE — 76210000006 HC OR PH I REC 0.5 TO 1 HR: Performed by: SURGERY

## 2022-08-09 PROCEDURE — 76010000138 HC OR TIME 0.5 TO 1 HR: Performed by: SURGERY

## 2022-08-09 PROCEDURE — 76210000026 HC REC RM PH II 1 TO 1.5 HR: Performed by: SURGERY

## 2022-08-09 PROCEDURE — 74011250636 HC RX REV CODE- 250/636: Performed by: NURSE ANESTHETIST, CERTIFIED REGISTERED

## 2022-08-09 PROCEDURE — 77030008518 HC TBNG INSUF ENDO STRY -B: Performed by: SURGERY

## 2022-08-09 PROCEDURE — 77030013079 HC BLNKT BAIR HGGR 3M -A: Performed by: ANESTHESIOLOGY

## 2022-08-09 PROCEDURE — 80307 DRUG TEST PRSMV CHEM ANLYZR: CPT

## 2022-08-09 PROCEDURE — 74011000258 HC RX REV CODE- 258: Performed by: SURGERY

## 2022-08-09 PROCEDURE — 74011000250 HC RX REV CODE- 250: Performed by: ANESTHESIOLOGY

## 2022-08-09 PROCEDURE — 77030031139 HC SUT VCRL2 J&J -A: Performed by: SURGERY

## 2022-08-09 PROCEDURE — 74011250637 HC RX REV CODE- 250/637: Performed by: SURGERY

## 2022-08-09 PROCEDURE — C1781 MESH (IMPLANTABLE): HCPCS | Performed by: SURGERY

## 2022-08-09 PROCEDURE — 74011250636 HC RX REV CODE- 250/636: Performed by: SURGERY

## 2022-08-09 PROCEDURE — 76060000032 HC ANESTHESIA 0.5 TO 1 HR: Performed by: SURGERY

## 2022-08-09 PROCEDURE — 74011250636 HC RX REV CODE- 250/636: Performed by: ANESTHESIOLOGY

## 2022-08-09 PROCEDURE — 77030008608 HC TRCR ENDOSC SMTH AMR -B: Performed by: SURGERY

## 2022-08-09 PROCEDURE — 00790 ANES IPER UPR ABD NOS: CPT | Performed by: ANESTHESIOLOGY

## 2022-08-09 PROCEDURE — 77030008683 HC TU ET CUF COVD -A: Performed by: ANESTHESIOLOGY

## 2022-08-09 PROCEDURE — 2709999900 HC NON-CHARGEABLE SUPPLY: Performed by: SURGERY

## 2022-08-09 PROCEDURE — 77030002933 HC SUT MCRYL J&J -A: Performed by: SURGERY

## 2022-08-09 PROCEDURE — C9290 INJ, BUPIVACAINE LIPOSOME: HCPCS | Performed by: SURGERY

## 2022-08-09 PROCEDURE — 77030025927 HC STPLR FIX SECURSTRP J&J -F: Performed by: SURGERY

## 2022-08-09 PROCEDURE — 49653 PR LAP, VENTRAL HERNIA REPAIR,INCARCERATED: CPT | Performed by: SURGERY

## 2022-08-09 PROCEDURE — 77030012770 HC TRCR OPT FX AMR -B: Performed by: SURGERY

## 2022-08-09 PROCEDURE — 77030020829: Performed by: SURGERY

## 2022-08-09 DEVICE — VENTRALIGHT ST MESH WITH ECHO PS POSITIONING SYSTEM, 4.5" (11.4 CM), CIRCLE
Type: IMPLANTABLE DEVICE | Site: ABDOMEN | Status: FUNCTIONAL
Brand: VENTRALIGHT

## 2022-08-09 RX ORDER — SODIUM CHLORIDE 0.9 % (FLUSH) 0.9 %
5-40 SYRINGE (ML) INJECTION AS NEEDED
Status: DISCONTINUED | OUTPATIENT
Start: 2022-08-09 | End: 2022-08-09 | Stop reason: HOSPADM

## 2022-08-09 RX ORDER — CEFAZOLIN SODIUM 1 G/3ML
INJECTION, POWDER, FOR SOLUTION INTRAMUSCULAR; INTRAVENOUS AS NEEDED
Status: DISCONTINUED | OUTPATIENT
Start: 2022-08-09 | End: 2022-08-09 | Stop reason: HOSPADM

## 2022-08-09 RX ORDER — ONDANSETRON 2 MG/ML
INJECTION INTRAMUSCULAR; INTRAVENOUS AS NEEDED
Status: DISCONTINUED | OUTPATIENT
Start: 2022-08-09 | End: 2022-08-09 | Stop reason: HOSPADM

## 2022-08-09 RX ORDER — OXYCODONE AND ACETAMINOPHEN 5; 325 MG/1; MG/1
1 TABLET ORAL
Qty: 10 TABLET | Refills: 0 | Status: SHIPPED | OUTPATIENT
Start: 2022-08-09 | End: 2022-08-12

## 2022-08-09 RX ORDER — ROCURONIUM BROMIDE 10 MG/ML
INJECTION, SOLUTION INTRAVENOUS AS NEEDED
Status: DISCONTINUED | OUTPATIENT
Start: 2022-08-09 | End: 2022-08-09 | Stop reason: HOSPADM

## 2022-08-09 RX ORDER — OXYCODONE AND ACETAMINOPHEN 5; 325 MG/1; MG/1
1 TABLET ORAL
Qty: 10 TABLET | Refills: 0 | Status: SHIPPED | OUTPATIENT
Start: 2022-08-09 | End: 2022-08-09 | Stop reason: CLARIF

## 2022-08-09 RX ORDER — IBUPROFEN 800 MG/1
800 TABLET ORAL
Qty: 30 TABLET | Refills: 1 | Status: SHIPPED | OUTPATIENT
Start: 2022-08-09 | End: 2022-08-09 | Stop reason: SDUPTHER

## 2022-08-09 RX ORDER — NEOSTIGMINE METHYLSULFATE 1 MG/ML
INJECTION, SOLUTION INTRAVENOUS AS NEEDED
Status: DISCONTINUED | OUTPATIENT
Start: 2022-08-09 | End: 2022-08-09 | Stop reason: HOSPADM

## 2022-08-09 RX ORDER — FAMOTIDINE 20 MG/1
20 TABLET, FILM COATED ORAL ONCE
Status: COMPLETED | OUTPATIENT
Start: 2022-08-09 | End: 2022-08-09

## 2022-08-09 RX ORDER — SODIUM CHLORIDE, SODIUM LACTATE, POTASSIUM CHLORIDE, CALCIUM CHLORIDE 600; 310; 30; 20 MG/100ML; MG/100ML; MG/100ML; MG/100ML
25 INJECTION, SOLUTION INTRAVENOUS CONTINUOUS
Status: DISCONTINUED | OUTPATIENT
Start: 2022-08-09 | End: 2022-08-09 | Stop reason: HOSPADM

## 2022-08-09 RX ORDER — LIDOCAINE HYDROCHLORIDE 20 MG/ML
INJECTION, SOLUTION EPIDURAL; INFILTRATION; INTRACAUDAL; PERINEURAL AS NEEDED
Status: DISCONTINUED | OUTPATIENT
Start: 2022-08-09 | End: 2022-08-09 | Stop reason: HOSPADM

## 2022-08-09 RX ORDER — MIDAZOLAM HYDROCHLORIDE 1 MG/ML
INJECTION, SOLUTION INTRAMUSCULAR; INTRAVENOUS AS NEEDED
Status: DISCONTINUED | OUTPATIENT
Start: 2022-08-09 | End: 2022-08-09 | Stop reason: HOSPADM

## 2022-08-09 RX ORDER — SODIUM CHLORIDE 0.9 % (FLUSH) 0.9 %
5-40 SYRINGE (ML) INJECTION EVERY 8 HOURS
Status: DISCONTINUED | OUTPATIENT
Start: 2022-08-09 | End: 2022-08-09 | Stop reason: HOSPADM

## 2022-08-09 RX ORDER — FENTANYL CITRATE 50 UG/ML
50 INJECTION, SOLUTION INTRAMUSCULAR; INTRAVENOUS AS NEEDED
Status: DISCONTINUED | OUTPATIENT
Start: 2022-08-09 | End: 2022-08-09 | Stop reason: HOSPADM

## 2022-08-09 RX ORDER — ENOXAPARIN SODIUM 100 MG/ML
40 INJECTION SUBCUTANEOUS ONCE
Status: COMPLETED | OUTPATIENT
Start: 2022-08-09 | End: 2022-08-09

## 2022-08-09 RX ORDER — GLYCOPYRROLATE 0.2 MG/ML
INJECTION INTRAMUSCULAR; INTRAVENOUS AS NEEDED
Status: DISCONTINUED | OUTPATIENT
Start: 2022-08-09 | End: 2022-08-09 | Stop reason: HOSPADM

## 2022-08-09 RX ORDER — SODIUM CHLORIDE, SODIUM LACTATE, POTASSIUM CHLORIDE, CALCIUM CHLORIDE 600; 310; 30; 20 MG/100ML; MG/100ML; MG/100ML; MG/100ML
150 INJECTION, SOLUTION INTRAVENOUS CONTINUOUS
Status: DISCONTINUED | OUTPATIENT
Start: 2022-08-09 | End: 2022-08-09 | Stop reason: HOSPADM

## 2022-08-09 RX ORDER — ACETAMINOPHEN 650 MG/1
650 SUPPOSITORY RECTAL ONCE
Status: DISCONTINUED | OUTPATIENT
Start: 2022-08-09 | End: 2022-08-09 | Stop reason: HOSPADM

## 2022-08-09 RX ORDER — PROPOFOL 10 MG/ML
INJECTION, EMULSION INTRAVENOUS AS NEEDED
Status: DISCONTINUED | OUTPATIENT
Start: 2022-08-09 | End: 2022-08-09 | Stop reason: HOSPADM

## 2022-08-09 RX ORDER — DEXAMETHASONE SODIUM PHOSPHATE 4 MG/ML
INJECTION, SOLUTION INTRA-ARTICULAR; INTRALESIONAL; INTRAMUSCULAR; INTRAVENOUS; SOFT TISSUE AS NEEDED
Status: DISCONTINUED | OUTPATIENT
Start: 2022-08-09 | End: 2022-08-09 | Stop reason: HOSPADM

## 2022-08-09 RX ORDER — FENTANYL CITRATE 50 UG/ML
INJECTION, SOLUTION INTRAMUSCULAR; INTRAVENOUS AS NEEDED
Status: DISCONTINUED | OUTPATIENT
Start: 2022-08-09 | End: 2022-08-09 | Stop reason: HOSPADM

## 2022-08-09 RX ORDER — IBUPROFEN 800 MG/1
800 TABLET ORAL
Qty: 30 TABLET | Refills: 1 | Status: SHIPPED | OUTPATIENT
Start: 2022-08-09

## 2022-08-09 RX ORDER — LIDOCAINE HYDROCHLORIDE 10 MG/ML
0.1 INJECTION, SOLUTION EPIDURAL; INFILTRATION; INTRACAUDAL; PERINEURAL AS NEEDED
Status: DISCONTINUED | OUTPATIENT
Start: 2022-08-09 | End: 2022-08-09 | Stop reason: HOSPADM

## 2022-08-09 RX ADMIN — GLYCOPYRROLATE 0.4 MG: 0.2 INJECTION INTRAMUSCULAR; INTRAVENOUS at 08:28

## 2022-08-09 RX ADMIN — MIDAZOLAM HYDROCHLORIDE 2 MG: 2 INJECTION, SOLUTION INTRAMUSCULAR; INTRAVENOUS at 07:57

## 2022-08-09 RX ADMIN — PROPOFOL 150 MG: 10 INJECTION, EMULSION INTRAVENOUS at 08:05

## 2022-08-09 RX ADMIN — SODIUM CHLORIDE, POTASSIUM CHLORIDE, SODIUM LACTATE AND CALCIUM CHLORIDE 25 ML/HR: 600; 310; 30; 20 INJECTION, SOLUTION INTRAVENOUS at 06:32

## 2022-08-09 RX ADMIN — FENTANYL CITRATE 50 MCG: 50 INJECTION INTRAMUSCULAR; INTRAVENOUS at 09:40

## 2022-08-09 RX ADMIN — ONDANSETRON 4 MG: 2 INJECTION INTRAMUSCULAR; INTRAVENOUS at 08:38

## 2022-08-09 RX ADMIN — Medication 3 MG: at 08:28

## 2022-08-09 RX ADMIN — FENTANYL CITRATE 50 MCG: 50 INJECTION INTRAMUSCULAR; INTRAVENOUS at 10:50

## 2022-08-09 RX ADMIN — CEFAZOLIN SODIUM 2 G: 1 INJECTION, POWDER, FOR SOLUTION INTRAMUSCULAR; INTRAVENOUS at 08:11

## 2022-08-09 RX ADMIN — FENTANYL CITRATE 50 MCG: 50 INJECTION INTRAMUSCULAR; INTRAVENOUS at 09:57

## 2022-08-09 RX ADMIN — FENTANYL CITRATE 50 MCG: 50 INJECTION, SOLUTION INTRAMUSCULAR; INTRAVENOUS at 08:32

## 2022-08-09 RX ADMIN — FENTANYL CITRATE 100 MCG: 50 INJECTION, SOLUTION INTRAMUSCULAR; INTRAVENOUS at 08:05

## 2022-08-09 RX ADMIN — DEXAMETHASONE SODIUM PHOSPHATE 4 MG: 4 INJECTION, SOLUTION INTRAMUSCULAR; INTRAVENOUS at 08:38

## 2022-08-09 RX ADMIN — ROCURONIUM BROMIDE 25 MG: 50 INJECTION INTRAVENOUS at 08:06

## 2022-08-09 RX ADMIN — FENTANYL CITRATE 50 MCG: 50 INJECTION, SOLUTION INTRAMUSCULAR; INTRAVENOUS at 08:42

## 2022-08-09 RX ADMIN — ENOXAPARIN SODIUM 40 MG: 100 INJECTION SUBCUTANEOUS at 06:32

## 2022-08-09 RX ADMIN — LIDOCAINE HYDROCHLORIDE 80 MG: 20 INJECTION, SOLUTION EPIDURAL; INFILTRATION; INTRACAUDAL; PERINEURAL at 08:05

## 2022-08-09 RX ADMIN — FAMOTIDINE 20 MG: 20 TABLET ORAL at 06:33

## 2022-08-09 NOTE — ANESTHESIA POSTPROCEDURE EVALUATION
Procedure(s):  LAPAROSCOPIC UMBILICAL HERNIA REPAIR WITH MESH. general    Anesthesia Post Evaluation      Multimodal analgesia: multimodal analgesia used between 6 hours prior to anesthesia start to PACU discharge  Patient location during evaluation: bedside  Patient participation: complete - patient participated  Level of consciousness: awake  Pain score: 0  Pain management: adequate  Airway patency: patent  Anesthetic complications: no  Cardiovascular status: stable  Respiratory status: acceptable  Hydration status: acceptable  Post anesthesia nausea and vomiting:  none  Final Post Anesthesia Temperature Assessment:  Normothermia (36.0-37.5 degrees C)      INITIAL Post-op Vital signs:   Vitals Value Taken Time   /78 08/09/22 0918   Temp 36.6 °C (97.8 °F) 08/09/22 0918   Pulse 50 08/09/22 0926   Resp 10 08/09/22 0926   SpO2 98 % 08/09/22 0927   Vitals shown include unvalidated device data.

## 2022-08-09 NOTE — TELEPHONE ENCOUNTER
Patient's wife Danica Huitron called and stated she did  percocet from pharmacy but missing ibuprofen 800 mg. States was told by Discharge nurse percocet and ibuprofen would be sent to pharmacy. I apologized to Danica Huitron for medication not being sent to pharmacy and  advised Danica Huitron that NP is already gone for the day but I will forward message as high priority. Danica Huitron verbalized understanding. States please call [phone] with any further questions or concerns.

## 2022-08-09 NOTE — ANESTHESIA PREPROCEDURE EVALUATION
Relevant Problems   No relevant active problems       Anesthetic History   No history of anesthetic complications            Review of Systems / Medical History  Patient summary reviewed and pertinent labs reviewed    Pulmonary            Asthma        Neuro/Psych         Psychiatric history     Cardiovascular  Within defined limits                Exercise tolerance: >4 METS     GI/Hepatic/Renal  Within defined limits              Endo/Other  Within defined limits           Other Findings              Physical Exam    Airway  Mallampati: II  TM Distance: 4 - 6 cm  Neck ROM: normal range of motion   Mouth opening: Normal     Cardiovascular  Regular rate and rhythm,  S1 and S2 normal,  no murmur, click, rub, or gallop  Rhythm: regular  Rate: normal         Dental    Dentition: Lower dentition intact and Upper dentition intact     Pulmonary  Breath sounds clear to auscultation               Abdominal  GI exam deferred       Other Findings            Anesthetic Plan    ASA: 2  Anesthesia type: general          Induction: Intravenous  Anesthetic plan and risks discussed with: Patient

## 2022-08-09 NOTE — H&P
Office Visit    7/14/2022  [facility]  Vanesa Saunders DO    General Surgery Umbilical hernia without obstruction and without gangrene    Dx New Patient ; Referred by Elias Myers MD    Reason for Visit       Progress Notes  Vanesa Saunders DO (Physician)   General Surgery              Consult     Patient: Omer Rodney MRN: [id number]  SSN: [id number]    YOB: 1985  Age: 39 y.o. Sex: male       Subjective:      Omer Rodney is a 39 y.o. white male who presents in referral for evaluation of a 1 month history of a swelling at the level of his umbilicus which has become increasingly more symptomatic with local achy discomfort especially after heavy lifting at the end of the day. The patient notes no change in the size of the bulge and specifically denies obstructive GI  and pulmonary symptomatology  Allergies: Sulfa-hives  Current medications: See medication list  Past medical history:  1. Allergic rhinitis  2. Reactive airway disease  3. Eczema  4. Anxiety/depression  Past surgical history:  1. Trosper tooth extractions age 25  Social history:  Tobacco: None  Alcohol approximately 1 ounce on a weekly basis  Mother 67- cutaneous  malignancies  F Father 70-hypertension  Brother 30-healthy amily history: Allergies   Allergen Reactions    Sulfa (Sulfonamide Antibiotics) Hives             Current Outpatient Medications   Medication Sig Dispense Refill    ibuprofen (MOTRIN) 200 mg tablet Take 200 mg by mouth.        escitalopram oxalate (LEXAPRO) 10 mg tablet Take 1 Tablet by mouth daily. Indications: anxiousness associated with depression 90 Tablet 1    VENTOLIN HFA 90 mcg/actuation inhaler TAKE 2 PUFFS BY INHALATION EVERY FOUR (4) HOURS AS NEEDED FOR WHEEZING.  1 Inhaler 0           Past Medical History:   Diagnosis Date    Anxiety and depression      Asthma      Eczema              Past Surgical History:   Procedure Laterality Date HX WISDOM TEETH EXTRACTION Bilateral        Social History            Tobacco Use    Smoking status: Never Smoker    Smokeless tobacco: Never Used   Substance Use Topics    Alcohol use: Yes       Alcohol/week: 8.0 standard drinks       Types: 2 Glasses of wine, 4 Cans of beer, 2 Shots of liquor per week            Family History   Problem Relation Age of Onset    Thyroid Disease Mother      Osteoporosis Mother      No Known Problems Father              Review of Systems:     General: Denies fevers, chills, night sweats, fatigue, weight loss, or weight gain. HEENT: Denies changes in auditory or visual acuity, recurrent pharyngitis, epistaxis, chronic rhinorrhea, vertigo    Respiratory: Denies increasing shortness of breath, productive cough, hemoptysis    Cardiac: Denies known history of cardiac disease, heart murmur, palpitations     GI: Denies dysphagia, recurrent emesis, hematemesis, changes in bowel habits, hematochezia, melena    : Denies hematuria frequency urgency dysuria    Musculoskeletal: Denies fractures, dislocations    Neurologic: Denies history of CVA, paralysis paresthesias, recurrent cephalgia, seizures    Endocrine: Denies polyuria, polydipsia, polyphagia, heat and cold intolerance    Lymph/heme: Denies a history of malignancy, anemia, bruising, blood transfusions     Integumentary: Negative for dermatitis     Objective:          Vitals:     07/14/22 1003   BP: 128/83   Pulse: 61   Resp: 16   Temp: 97.2 °F (36.2 °C)   SpO2: 97%   Weight: 79.4 kg (175 lb)   Height: 5' 11\" (1.803 m)         General: no acute distress, nontoxic in appearance. Head: Normocephalic, atraumatic  Mouth: Clear, no overt lesions, oral mucosa is pink and moist.  Neck: Supple, no masses, no adenopathy or carotid bruits, trachea midline  Resp: Clear to auscultation bilaterally, no wheezing, rhonchi, or rales, excursions normal and symmetrical.  Cardio: Regular rate and rhythm, no murmurs, clicks, gallops, or rubs.   Abdomen: soft, nontender, nondistended, normoactive bowel sounds, easily reducible umbilical hernia with a 3 cm subcutaneous component and a 1 cm fascial defect  Extremities: Warm, well perfused, no tenderness or swelling, normal gait/station, without edema or varicosities  Neuro: Sensation and strength grossly intact and symmetrical.  Psych: Alert and oriented to person, place, and time. Assessment:      Umbilical hernia        Plan:      The diagnosis, potential management strategies including nonoperative/observational versus surgical intervention were discussed the risk benefits of operating versus not potential alternatives were addressed. The patient noted his understanding wishes to consider the options and will notify us of his decision as to whether he desires to pursue surgical intervention     The above history physical examination reviewed the proposed laparoscopic entry open umbilical hernia repair utilizing mesh was again discussed. There is been no medical or surgical history. There is benefits of operating versus not potential alternatives and potential complications up to including death were again reviewed. The patient noted his understanding of the discussion wished to proceed.     Enrrique Nieves DO, FACS        Signed By: Bharati Funk DO      July 14, 2022

## 2022-08-09 NOTE — BRIEF OP NOTE
Brief Postoperative Note    Patient: Yolis Choi  YOB: 1985  MRN: [id number]    Date of Procedure: 8/9/2022     Pre-Op Diagnosis: Umbilical hernia without obstruction or gangrene [K42.9]    Post-Op Diagnosis: Same as preoperative diagnosis. Procedure(s):  LAPAROSCOPIC UMBILICAL HERNIA REPAIR WITH MESH    Surgeon(s):  Floridalma Olmstead DO    Surgical Assistant: Surg Asst-1: Cheryl Miranda    Anesthesia: General     Estimated Blood Loss (mL): Minimal    Complications: None    Specimens: * No specimens in log *     Implants:   Implant Name Type Inv.  Item Serial No.  Lot No. LRB No. Used Action   MESH W/ECHO PS 11.4CM CIR -- VENTRALIGHT ORDER BY CA - [id number]  MESH W/ECHO PS 11.4CM CIR -- VENTRALIGHT ORDER BY HELADIO PURVIS_JULISA [id number] N/A 1 Implanted       Drains: * No LDAs found *    Findings: 2 cm umbilical hernia fascial defect    Electronically Signed by Kathryn Marte DO on 8/9/2022 at 8:46 AM

## 2022-08-09 NOTE — DISCHARGE INSTRUCTIONS
Regular diet   Resume preadmission medications with exception of 200 mg Motrin. Began Motrin/Percocet as needed analgesia   Discontinue dressings and begin daily showers August 10, 2022   Trim Steri-Strips as needed   Activity ad abner. avoiding lifting greater than 20 pounds or service for 30 days following surgical procedure         Umbilical Hernia Repair: What to Expect at [address] [place] [address]     After surgery, you are likely to have pain for a few days. The area around your navel may be swollen. You may also feel tired and have less energy than normal. This is common. You should feel better after a few days. This care sheet gives you a general idea about how long it will take for you to recover. But each person recovers at a different pace. Follow the steps below to get better as quickly as possible. How can you care for yourself at home? Activity    Allow your body to heal. Don't move quickly until you are feeling better. Don't lift anything heavier than 10 pounds until your doctor says it's okay. Rest when you feel tired. You can do your normal activities when it feels okay to do so. Be active. Walking is a good choice. Hold a pillow over your incisions when you cough or take deep breaths. This will support your belly and may help to decrease your pain. Many people are able to return to work within 2 to 3 days after surgery. But if your job requires you to do heavy lifting or strenuous activity, you may need to take 4 to 6 weeks off from work. Diet    You can eat your normal diet. If your stomach is upset, try bland, low-fat foods like plain rice, broiled chicken, toast, and yogurt. If your bowel movements are not regular right after surgery, try to avoid constipation and straining. Drink plenty of water. Your doctor may suggest fiber, a stool softener, or a mild laxative. Medicines    Be safe with medicines. Read and follow all instructions on the label.   If the doctor gave you a prescription medicine for pain, take it as prescribed. If you are not taking a prescription pain medicine, ask your doctor if you can take an over-the-counter medicine. If you take aspirin or some other blood thinner, be sure to talk to your doctor. He or she will tell you if and when to start taking this medicine again. Make sure that you understand exactly what your doctor wants you to do. Your doctor will tell you if and when you can restart your medicines. He or she will also give you instructions about taking any new medicines. Incision care    You will have a dressing over the cut (incision). A dressing helps the incision heal and protects it. Your doctor will tell you how to take care of this. If you have strips of tape on the cut (incision) the doctor made, leave the tape on for a week or until it falls off. If you had stitches, your doctor will tell you when to come back to have them removed. If you have skin adhesive on the cut (incision), leave it on until it falls off. Skin adhesive is also called liquid stitches. You may cover the area with a gauze bandage if it oozes fluid or rubs against clothing. Wash the area daily with warm water, and pat it dry. Don't use hydrogen peroxide or alcohol. They can slow healing. Hygiene    You may shower 24 to 48 hours after surgery, if your doctor okays it. Pat the incision dry. Do not take a bath for the first 2 weeks, or until your doctor tells you it is okay. Other instructions    You may have a special girdle, called a binder, placed around the area where you had surgery. This binder will help ease swelling and pain. Your doctor will tell you how long to wear it. Follow-up care is a key part of your treatment and safety. Be sure to make and go to all appointments, and call your doctor if you are having problems. It's also a good idea to know your test results and keep a list of the medicines you take.   When should you call for help? Call 911 anytime you think you may need emergency care. For example, call if:    You passed out (lost consciousness). You have severe trouble breathing. You have symptoms of a blood clot in your lung (called a pulmonary embolism). These may include:  Sudden chest pain. Trouble breathing. Coughing up blood. Call your doctor now or seek immediate medical care if:    You have pain that does not get better after you take pain medicine. You cannot pass stool or gas. You have loose stitches, or your incision comes open. You are bleeding from the incision. You have signs of a blood clot, such as pain in your calf, back of the knee, thigh, or groin. You have signs of infection, such as: Increased pain, swelling, warmth, or redness. Red streaks leading from the incision. Pus draining from the incision. A fever. Watch closely for changes in your health, and be sure to contact your doctor if:    You do not have a bowel movement within several days after the surgery. You do not get better as expected. Current as of: September 8, 2021               Content Version: 13.2  © 2006-2022 [employer]. Care instructions adapted under license by [employer] (which disclaims liability or warranty for this information). If you have questions about a medical condition or this instruction, always ask your healthcare professional. [place] [address] any warranty or liability for your use of this information. DISCHARGE SUMMARY from Nurse    PATIENT INSTRUCTIONS:    After general anesthesia or intravenous sedation, for 24 hours or while taking prescription Narcotics:  Limit your activities  Do not drive and operate hazardous machinery  Do not make important personal or business decisions  Do  not drink alcoholic beverages  If you have not urinated within 8 hours after discharge, please contact your surgeon on call.     Report the following to your surgeon:  Excessive pain, swelling, redness or odor of or around the surgical area  Temperature over 100.5  Nausea and vomiting lasting longer than 4 hours or if unable to take medications  Any signs of decreased circulation or nerve impairment to extremity: change in color, persistent  numbness, tingling, coldness or increase pain  Any questions    What to do at Home:      *  Please give a list of your current medications to your Primary Care Provider. *  Please update this list whenever your medications are discontinued, doses are      changed, or new medications (including over-the-counter products) are added. *  Please carry medication information at all times in case of emergency situations. These are general instructions for a healthy lifestyle:    No smoking/ No tobacco products/ Avoid exposure to second hand smoke  Surgeon General's Warning:  Quitting smoking now greatly reduces serious risk to your health. Obesity, smoking, and sedentary lifestyle greatly increases your risk for illness    A healthy diet, regular physical exercise & weight monitoring are important for maintaining a healthy lifestyle    You may be retaining fluid if you have a history of heart failure or if you experience any of the following symptoms:  Weight gain of 3 pounds or more overnight or 5 pounds in a week, increased swelling in our hands or feet or shortness of breath while lying flat in bed. Please call your doctor as soon as you notice any of these symptoms; do not wait until your next office visit. The discharge information has been reviewed with the patient. The patient verbalized understanding. Discharge medications reviewed with the patient and appropriate educational materials and side effects teaching were provided.   ___________________________________________________________________________________________________________________________________

## 2022-08-10 NOTE — TELEPHONE ENCOUNTER
Received message that ibuprofen 800 mg was not sent in to pharmacy. Sent in new prescription and left message on wife's phone that script was sent in.

## 2022-08-10 NOTE — OP NOTES
[address] [place] [address]   OPERATIVE REPORT    Name:  Liana Dick  MR#:   [id number]  :  1985  ACCOUNT #:  [de-identified]  DATE OF SERVICE:  2022    PREOPERATIVE DIAGNOSIS:  Symptomatic umbilical hernia. POSTOPERATIVE DIAGNOSIS:  Symptomatic umbilical hernia with incarcerated properitoneal fat with the fascial defect measuring 2 cm. PROCEDURES PERFORMED:  1. Laparoscopic primary umbilical hernia repair. 2.  Laparoscopic umbilical hernia repair utilizing a 4-inch circular Ventralight mesh implant with echolocation. SURGEON:  Chandler Pantoja. Aly Mcdaniels DO.    FIRST ASSISTANT:  Cheryl Miranda SA.    ANESTHESIA:  General endotracheal supplemented at the conclusion of the operative procedure with local infiltration of the operative sites utilizing 266 mg of Exparel diluted in 50 mL of normal saline solution. COMPLICATIONS:  None. SPECIMENS REMOVED:  None. IMPLANTS: None. ESTIMATED BLOOD LOSS:  Less than 5 mL. OPERATIVE FINDINGS:  A 2-cm umbilical fascial defect with incarcerated properitoneal fat. INDICATIONS FOR SURGICAL PROCEDURE:  This is a 14-year-old white male with a symptomatic umbilical hernia, who after discussing the options for management, has elected to pursue laparoscopic, potentially open primary and mesh umbilical hernia repair. The risks and benefits of operative versus nonoperative potential alternatives and potential complications to include, but not limited to undesired cosmetic result; conversion to open procedure; wound hematoma, seroma, or infection; mesh infection or extrusion; chronic abdominal wall pain; injury to any intra-abdominal or retroperitoneal structure; intra-abdominal adhesive disease; DVT; PE; pneumonia; myocardial infarction; recurrent hernia; stroke; and potentially death were discussed in detail with the patient prior to the surgical procedure, who voiced his understanding and wished to proceed.     DESCRIPTION OF PROCEDURE:  The patient received Ancef for antibiotic prophylaxis and Lovenox for DVT prophylaxis in the preoperative staging area. After voiding and then signing his operative permit, which was properly witnessed, he was then transported to the operating room, where he was placed in the supine position on the operating table and SCDs were placed and inflated prior to the induction of general endotracheal anesthesia. The abdomen was then prepped and draped in the usual sterile fashion. A time-out procedure was performed according to protocol and agreed upon by all personnel in the operating suite. A 2-mm incision was made just superior to the umbilicus in the midline through which a Veress needle was placed into the peritoneal cavity and utilized to insufflate the peritoneal cavity never exceeding a pressure of 15 mmHg. A transverse 12-mm incision was then made in the right anterior axillary line two fingerbreadths below the costal margin and a 12-mm OptiView trocar and cannula were placed into the peritoneal cavity under direct vision utilizing a 10-mm 0-degree laparoscope. The laparoscope and trocar were removed. The abdomen was continued to be insufflated with carbon dioxide gas never exceeding a pressure of 15 mmHg and a 30-degree 10-mm laparoscope was placed and utilized for the remainder of the procedure. A transverse 5-mm cutaneous incision was then made 8 cm below the level of the first in the right anterior axillary line and a 5-mm OptiView trocar and cannula were placed into the peritoneal cavity under direct vision. Visualization of the anterior abdominal wall noted the patient to have an umbilical fascial defect at the umbilicus, which contained incarcerated properitoneal fat. The peritoneum was incised circumferentially and external compression and internal traction were utilized to reduce the incarcerated properitoneal fat.   A suture passing device and #2 Vicryl suture were then utilized to close the 2-cm umbilical fascial hernia defect with simple interrupted sutures. The abdomen was desufflated off carbon dioxide gas. The fascial sutures were tied. The abdomen was re-insufflated with carbon dioxide gas never exceeding a pressure of 15 mmHg and a 4 x 4 Ventralight ST mesh was implanted into the abdomen. The mesh had the echolocation device and the insufflation tubings were retrieved through the exact midportion of the previously primarily repaired umbilical fascial defect. After inflating the balloon, the mesh was anchored in position utilizing an Ethicon absorbable fixation device placing the fixation devices circumferentially 1.5 cm around the full perimeter of the mesh as well as within the body of the mesh. The echolocation inflation device was then removed. A 5-mm 30-degree laparoscope was placed through the 5-mm port and utilized to visualize the 12-mm fascial trocar defect, which was closed with a suture passing device and #2 Vicryl suture. All operative sites were inspected and noted to be hemostatic. The abdomen was then desufflated off carbon dioxide gas. The trocars were removed under direct vision. The fascial suture was tied. The wounds were irrigated with normal saline solution and closure of the skin was accomplished with a series of simple interrupted buried deep dermal 4-0 Monocryl sutures. The incisional sites were then infiltrated with 266 mg of Exparel diluted in 50 mL of normal saline solution. Steri-Strips were applied as were sterile dressings. Sponge and needle count was correct both during and at the completion of the procedure. The patient tolerated the procedure without operative complications, subsequently was extubated and transferred to the recovery room in awake, alert, and stable condition. The estimated blood loss was less than 5 mL. The patient received 400 mL of crystalloid during the procedure. The operative start time was 0818, completion time was 0840.         PADMINI Lopez DO      [id number]  D:  08/09/2022 9:11  T:  08/09/2022 21:06  JOB #:  [id number]

## 2022-08-18 ENCOUNTER — OFFICE VISIT (OUTPATIENT)
Dept: SURGERY | Age: 37
End: 2022-08-18
Payer: COMMERCIAL

## 2022-08-18 VITALS
DIASTOLIC BLOOD PRESSURE: 79 MMHG | BODY MASS INDEX: 24.22 KG/M2 | TEMPERATURE: 97.1 F | HEART RATE: 72 BPM | WEIGHT: 173 LBS | OXYGEN SATURATION: 97 % | HEIGHT: 71 IN | SYSTOLIC BLOOD PRESSURE: 133 MMHG | RESPIRATION RATE: 16 BRPM

## 2022-08-18 DIAGNOSIS — Z87.19 STATUS POST HERNIA REPAIR: Primary | ICD-10-CM

## 2022-08-18 DIAGNOSIS — Z98.890 STATUS POST HERNIA REPAIR: Primary | ICD-10-CM

## 2022-08-18 PROCEDURE — 99024 POSTOP FOLLOW-UP VISIT: CPT | Performed by: SURGERY

## 2022-08-18 RX ORDER — LORATADINE 10 MG/1
10 TABLET ORAL
COMMUNITY

## 2022-08-18 NOTE — PROGRESS NOTES
Surgical Follow-Up Evaluation    Katlin Win is a 39 y.o. white male who presents in follow-up status post laparoscopic umbilical hernia repair utilizing mesh on August 9, 2022 note expected decrease incisional discomfort utilizing intermittent Motrin for analgesia and otherwise without complaint. Tolerant of and compliant with a regular diet with normal bowel bladder habits. The patient denies local or systemic signs or symptoms of infectious complications. Allergies   Allergen Reactions    Sulfa (Sulfonamide Antibiotics) Hives       Current Outpatient Medications on File Prior to Visit   Medication Sig Dispense Refill    loratadine (Claritin) 10 mg tablet Take 10 mg by mouth. ibuprofen (MOTRIN) 800 mg tablet Take 1 Tablet by mouth every eight (8) hours as needed for Pain. 30 Tablet 1    escitalopram oxalate (LEXAPRO) 10 mg tablet Take 1 Tablet by mouth daily. Indications: anxiousness associated with depression 90 Tablet 1    VENTOLIN HFA 90 mcg/actuation inhaler TAKE 2 PUFFS BY INHALATION EVERY FOUR (4) HOURS AS NEEDED FOR WHEEZING. 1 Inhaler 0     No current facility-administered medications on file prior to visit. Past Medical History:   Diagnosis Date    Anxiety and depression     Asthma     Eczema        Past Surgical History:   Procedure Laterality Date    HX WISDOM TEETH EXTRACTION Bilateral        Social History     Tobacco Use    Smoking status: Never    Smokeless tobacco: Never   Vaping Use    Vaping Use: Never used   Substance Use Topics    Alcohol use: Yes     Alcohol/week: 8.0 standard drinks     Types: 2 Glasses of wine, 4 Cans of beer, 2 Shots of liquor per week    Drug use: Yes     Types: Marijuana     Comment: Edible Marijuana       Family History   Problem Relation Age of Onset    Thyroid Disease Mother     Osteoporosis Mother     No Known Problems Father        ROS:  General: Negative for fevers, chills, night sweats, fatigue, weight loss, or weight gain.     GI: Negative for abdominal pain, change in bowel habits, hematochezia, melena, or GERD. Integumentary: Negative for dermatitis or abnormal moles. HEENT: Negative for visual changes, vertigo, epistaxis, dysphagia, or hoarseness    Cardiac: Negative for chest pain, palpitations, hypertension, edema, or varicosities    Resp: Negative for cough, shortness of breath, wheezing, hemoptysis, snoring, or reactive airway disease    : Negative for hematuria, dysuria, frequency, urgency, nocturia, or stress urinary incontinence    MSK:  Negative for weakness, joint pain, or arthritis    Endocrine: Negative for diabetes, thyroid disease, polyuria, polydipsia, polyphagia, poor wound, heat intolerance, or cold intolerance. Lymph/Heme: Negative for anemia, bruising, or history of blood transfusions. Neuro:  Negative for dizziness, headache, fainting, seizures, and stroke. Psychiatry:  Negative for anxiety or depression    Physical Exam    Visit Vitals  /79   Pulse 72   Temp 97.1 °F (36.2 °C)   Resp 16   Ht 5' 11\" (1.803 m)   Wt 78.5 kg (173 lb)   SpO2 97%   BMI 24.13 kg/m²       Nursing note reviewed. General: 39 y.o. male is in no acute distress. Resp: Clear to auscultation bilaterally, no wheezing, rhonchi, or rales, excursions normal and symmetrical.  Cardio: Regular rate and rhythm, no murmurs, clicks, gallops, or rubs. No edema or varicosities. Abdomen: Obese, soft, nontender, nondistended, normoactive bowel sounds, wounds clean dry approximated with appropriate incisional tenderness without evidence of infectious complications or incisional hernia  Psych: Alert and oriented to person, place, and time.     Impression    Status post laparoscopic umbilical hernia repair utilizing mesh August 9, 2022-doing well      Plan    Continue pursuit of a healthy regular diet, utilization of analgesics on as-needed basis, refrain from heavy lifting until 1 month after surgical procedure which time he may progress his activity as he tolerates.   Follow-up as needed

## 2022-08-18 NOTE — PROGRESS NOTES
Yolis Choi is a 39 y.o. male (: 1985) presenting to address:    Chief Complaint   Patient presents with    Post OP Follow Up     Umbilical hernia 22       Medication list and allergies have been reviewed with Yolis Faridarafael and updated as of today's date. I have gone over all Medical, Surgical and Social History with Yolis Choi and updated/added the information accordingly. 1. Have you been to the ER, urgent care clinic since your last visit? Hospitalized since your last visit? No    2. Have you seen or consulted any other health care providers outside of the [address] [place] [address] since your last visit? Include any pap smears or colon screening.  No

## 2022-08-25 DIAGNOSIS — M25.512 LEFT SHOULDER PAIN, UNSPECIFIED CHRONICITY: Primary | ICD-10-CM

## 2022-09-19 ENCOUNTER — HOSPITAL ENCOUNTER (OUTPATIENT)
Dept: PHYSICAL THERAPY | Age: 37
Discharge: HOME | End: 2022-09-19
Payer: COMMERCIAL

## 2022-09-19 PROCEDURE — 97112 NEUROMUSCULAR REEDUCATION: CPT

## 2022-09-19 PROCEDURE — 97161 PT EVAL LOW COMPLEX 20 MIN: CPT

## 2022-09-19 NOTE — PROGRESS NOTES
PT DAILY TREATMENT NOTE     Patient Name: Yolis Choi  Date:2022  : 1985  [x]  Patient  Verified  Payor: BLUE [employer] / Plan: Ifeoma Johnston [id number] PPO / Product Type: PPO /    In time:1:18  Out time:2:02  Total Treatment Time (min): 44  Visit #: 1 of 10    Medicare/BCBS Only   Total Timed Codes (min):  18 1:1 Treatment Time:  44       Treatment Area: Left shoulder pain [M25.512]    SUBJECTIVE  Pain Level (0-10 scale): 3  Any medication changes, allergies to medications, adverse drug reactions, diagnosis change, or new procedure performed?: [x] No    [] Yes (see summary sheet for update)  Subjective functional status/changes:   [] No changes reported    CC: left shoulder pain  History/Mechanism of Injury: 2021[facility]  Hernia surgery early 2022  Current Symptoms/Complaints:   Left shoulder pain- piano key worsening   shooting pains into both shoulders, running down sides/back neck  Neck stiffness/tightness   Pain-  Current: 3/10     Worst: 7/10   Best: 0/10  Aggravated By: prolonged inactivity, lifting  Alleviated By: exercise  Previous Treatment/Compliance: previus PT  PMHx/Surgical Hx: asthma, hernia surgery 22-   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 Rationale: to improve the patients ability to adhere to HEP and therapy sessions for increased compliance when working toward therapy goals.      15 min Neuromuscular Re-education:  []  See flow sheet :   Rationale: increase strength, improve coordination, and increase proprioception  to improve the patients ability to perform overhead reaching with decreased pain. With   [] TE   [x] TA   [] neuro   [] other: Patient Education: [x] Review HEP    [] Progressed/Changed HEP based on:   [] positioning   [] body mechanics   [] transfers   [] heat/ice application    [] other:      Other Objective/Functional Measures:     Observation: left piano key deformity  Palpation: TTP at left [facility] joint (inferolateral)    Shoulder AROM/PROM:                                           AROM (deg)              PROM (deg)   Right Left Right Left   Flexion  170      Extension  WFL     Scaption  164     ER at 45 Deg ABD  WFL     IR at 39 Deg ABD  WFL     Seated Flexion   161 --------------- ---------------     Functional ER: symmetrical  Functional IR: symmetrical    C/S Screening: all movements WNL except side bend B 21 deg     Strength:   Right (/5) Left (/5)   GHJ   Flexion 5 4+             Abduction 5 5             Extension 5 5             ER 4+ 4+             IR 5 5   Upper Trapezius  5 5   Middle/Lower Trap  (Gross) 5 4+   Elbow Flexion 5 5   Elbow Extension 5 5          Scapulohumeral Rhythm: increased left inferomedial border winging    Reflexes/Sensation: denies numbness/tinglin     Pain Level (0-10 scale) post treatment: 3    ASSESSMENT/Changes in Function: See POC    Patient will continue to benefit from skilled PT services to modify and progress therapeutic interventions, address functional mobility deficits, address ROM deficits, address strength deficits, analyze and address soft tissue restrictions, analyze and cue movement patterns, analyze and modify body mechanics/ergonomics, assess and modify postural abnormalities, address imbalance/dizziness and instruct in home and community integration to attain remaining goals.      [x]  See Plan of Care  []  See progress note/recertification  []  See Discharge Summary         Progress towards goals / Updated goals:  See POC    PLAN  [x]  Upgrade activities as tolerated     []  Continue plan of care  [x]  Update interventions per flow sheet       []  Discharge due to:_  []  Other:_      Delaney Hinds 9/19/2022  1:18 PM    Future Appointments   Date Time Provider Karishma Ma   9/23/2022  3:00 PM DO MARISEL Carbajal BS AMB   '

## 2022-09-19 NOTE — PROGRESS NOTES
[address] PHYSICAL THERAPY AT [facility]   [address] [facility] [address], located within [facility], [place] [address]  Phone: [phone] Fax: [phone] / [address] [address] PHYSICAL THERAPY SERVICES  Patient Name: Bessie Castañeda : 1985   Medical   Diagnosis: Left shoulder pain [M25.512] Treatment Diagnosis: Left shoulder pain   Onset Date: 2022     Referral Source: Marcus Cid NP [facility]): 2022   Prior Hospitalization: See medical history Provider #: [id number]   Prior Level of Function: Functionally independent, right handed,  at [employer]   Comorbidities: Asthma, umbilical hernia repair 3/9/78   Medications: Verified on Patient Summary List   The Plan of Care and following information is based on the information from the initial evaluation.     ========================================================================    Assessment / key information:  Pt is a pleasant 39 y.o. male who presents with c/o left shoulder pain. The patient returns to the clinic today following significant exacerbation of left shoulder pain associated with previous left AC joint sprain. The pt had discharged skilled therapy and was managing well his independent HEP, but his HEP was difficult to maintain following umbilical hernia repair on 22, leading to recurrence of left shoulder pain. Signs/symptoms at eval consistent with recurrence of left AC joint pain. Functional deficits include: impaired ER strength, impaired terminal shoulder flexion strength, increased pain levels in left shoulder impairing function. Rehab potential is good due to previous positive response to skilled PT.  Pt would benefit from skilled PT to address above deficits to improve Pt's function and ability to return to PLOF with decreased pain.    ========================================================================    Eval Complexity: History: MEDIUM  Complexity : 1-2 comorbidities / personal factors will impact the outcome/ POC Exam:LOW Complexity : 1-2 Standardized tests and measures addressing body structure, function, activity limitation and / or participation in recreation  Presentation: LOW Complexity : Stable, uncomplicated  Clinical Decision Making:MEDIUM Complexity : FOTO score of 26-74Overall Complexity:LOW   Problem List: pain affecting function, decrease ROM, decrease strength, decrease ADL/ functional abilitiies, decrease activity tolerance, decrease flexibility/ joint mobility, and decrease transfer abilities   Treatment Plan may include any combination of the following: Therapeutic exercise, Therapeutic activities, Neuromuscular re-education, Physical agent/modality, Gait/balance training, Manual therapy, Aquatic therapy, Patient education, Self Care training, Functional mobility training, Home safety training, and Stair training  Patient / Family readiness to learn indicated by: asking questions    Persons(s) to be included in education: patient (P)  Barriers to Learning/Limitations: None  Measures taken:    Patient Goal (s): \"Revise exercise program post op\"   Patient self reported health status: good  Rehabilitation Potential: good    GOALS-  Short Term Goals: To be accomplished in 1 week  - Goal: Pt to be compliant with initial HEP to improve shoulder range of motion and pain. Status at last note/certification: Established and reviewed with Pt  Long Term Goals: To be accomplished in 10 treatments  - Goal: Pt to demonstrate 5/5 shoulder flexion MMT and end range flexion of left shoulder to improve overhead lifting ability   Status at last note/certification: 4/5 shoulder flexion MMT at end range  - Goal: Pt to demonstrate 5/5 shoulder ER MMT of left shoulder to improve overhead lifting ability   Status at last note/certification: 4+/5   - Goal: Pt to report < 3/10 pain at worst to return to PLOF activities.   Status at last note/certification: 8/65 pain at worst  - Goal: Pt to report FOTO score of at least 78 pts to show improved function and quality of life. Status at last note/certification: FOTO 70 pts     Frequency / Duration:   -Patient to be seen  1-2  times per week for 8  treatments:      Patient / Caregiver education and instruction: self care, activity modification, and exercises    Therapist Signature: Seymour Giffordm Date: 6/76/4471   Certification Period:  Time: 5:40 PM   ========================================================================  I certify that the above Physical Therapy Services are being furnished while the patient is under my care. I agree with the treatment plan and certify that this therapy is necessary. Physician Signature:        Date:       Time: ___                                            Lyric Esteban NP. Please sign and return to [facility] [place] or you may fax the signed copy to [phone]. Thank you.

## 2022-09-26 ENCOUNTER — HOSPITAL ENCOUNTER (OUTPATIENT)
Dept: PHYSICAL THERAPY | Age: 37
Discharge: HOME | End: 2022-09-26
Payer: COMMERCIAL

## 2022-09-26 PROCEDURE — 97112 NEUROMUSCULAR REEDUCATION: CPT

## 2022-09-26 PROCEDURE — 97110 THERAPEUTIC EXERCISES: CPT

## 2022-09-26 NOTE — PROGRESS NOTES
PT DAILY TREATMENT NOTE     Patient Name: Bessie Castañeda  Date:2022  : 1985  [x]  Patient  Verified  Payor: BLUE CROSS / Plan: Ifeoma Johnston [id number] PPO / Product Type: PPO /    In time:11:34  Out time:12:28  Total Treatment Time (min): 54  Visit #: 2 of 8    Medicare/BCBS Only   Total Timed Codes (min):  44 1:1 Treatment Time:  40       Treatment Area: Left shoulder pain [M25.512]    SUBJECTIVE  Pain Level (0-10 scale): 2  Any medication changes, allergies to medications, adverse drug reactions, diagnosis change, or new procedure performed?: [x] No    [] Yes (see summary sheet for update)  Subjective functional status/changes:   [] No changes reported  \"I have some questions about the Ts and Ws in standing. \"    OBJECTIVE    Modality rationale: decrease pain to improve the patients ability to relax and sleep following therapy session to improve restorative sleep patterns.     Min Type Additional Details    [x] Estim:  []Unatt       []IFC  []Premod                        []Other:  []w/ice   []w/heat  Position:   Location:     [] Estim: []Att    []TENS instruct  []NMES                    []Other:  []w/US   []w/ice   []w/heat  Position:  Location:    []  Traction: [] Cervical       []Lumbar                       [] Prone          []Supine                       []Intermittent   []Continuous Lbs:  [] before manual  [] after manual    []  Ultrasound: []Continuous   [] Pulsed                           []1MHz   []3MHz W/cm2:  Location:    []  Iontophoresis with dexamethasone         Location: [] Take home patch   [] In clinic   10 [x]  Ice     []  heat  []  Ice massage  []  Laser   []  Anodyne Position: reclined  Location: left shoulder    []  Laser with stim  []  Other:  Position:  Location:    []  Vasopneumatic Device    []  Right     []  Left  Pre-treatment girth:  Post-treatment girth:  Measured at (location):  Pressure:       [] lo [] med [] hi   Temperature: [] lo [] med [] hi   [x] Skin assessment post-treatment:  [x]intact []redness- no adverse reaction    []redness - adverse reaction:     13 min Therapeutic Exercise:  [x] See flow sheet :   Rationale: increase ROM and increase strength to improve the patients ability to perform ADLs with improved shoulder/elbow strength and mobility. 312 min Neuromuscular Re-education:  [x]  See flow sheet :   Rationale: increase ROM, increase strength, improve coordination, improve balance and increase proprioception  to improve the patients ability to perform overhead functional lifts with improved scapular stabilization and neutral cervical posture. With   [] TE   [] TA   [] neuro   [] other: Patient Education: [x] Review HEP    [] Progressed/Changed HEP based on:   [] positioning   [] body mechanics   [] transfers   [] heat/ice application    [] other:      Other Objective/Functional Measures: -Initiated therapy per flow sheet     Pain Level (0-10 scale) post treatment: 2    ASSESSMENT/Changes in Function: Patient requires skilled verbal/visual cuing for correct technique with all interventions today, especially for improved awareness of scapular stability during interventions today. Patient reports significant interscapular mm and external rotation mm fatigue following today's session. Overall positive response to initial HEP    Patient will continue to benefit from skilled PT services to modify and progress therapeutic interventions, address functional mobility deficits, address ROM deficits, address strength deficits, analyze and address soft tissue restrictions, analyze and cue movement patterns, analyze and modify body mechanics/ergonomics, assess and modify postural abnormalities, address imbalance/dizziness and instruct in home and community integration to attain remaining goals. []  See Plan of Care  []  See progress note/recertification  []  See Discharge Summary         Progress towards goals / Updated goals:  Short Term Goals:  To be accomplished in 1 week  - Goal: Pt to be compliant with initial HEP to improve shoulder range of motion and pain. Status at last note/certification: Established and reviewed with Pt  Current: met, pt reports compliance with HEP (9/26/22)  Long Term Goals: To be accomplished in 10 treatments  - Goal: Pt to demonstrate 5/5 shoulder flexion MMT and end range flexion of left shoulder to improve overhead lifting ability   Status at last note/certification: 4/5 shoulder flexion MMT at end range  Current:   - Goal: Pt to demonstrate 5/5 shoulder ER MMT of left shoulder to improve overhead lifting ability   Status at last note/certification: 4+/5   Current:   - Goal: Pt to report < 3/10 pain at worst to return to PLOF activities. Status at last note/certification: 9/91 pain at worst  Current:   - Goal: Pt to report FOTO score of at least 78 pts to show improved function and quality of life.   Status at last note/certification: FOTO 70 pts   Current:     PLAN  [x]  Upgrade activities as tolerated     [x]  Continue plan of care  []  Update interventions per flow sheet       []  Discharge due to:_  []  Other:_      Stephany Palomino 9/26/2022  9:56 AM    Future Appointments   Date Time Provider Karishma Ma   9/26/2022 11:30 AM Giana Bello [facility]   10/5/2022 11:30 AM Giana Pyo [facility]   10/10/2022 11:30 AM Giana Pyo [facility]   10/17/2022 11:30 AM Giana Pyo [facility]

## 2022-10-03 ENCOUNTER — APPOINTMENT (OUTPATIENT)
Dept: PHYSICAL THERAPY | Age: 37
End: 2022-10-03
Payer: COMMERCIAL

## 2022-10-05 ENCOUNTER — APPOINTMENT (OUTPATIENT)
Dept: PHYSICAL THERAPY | Age: 37
End: 2022-10-05
Payer: COMMERCIAL

## 2022-10-10 ENCOUNTER — APPOINTMENT (OUTPATIENT)
Dept: PHYSICAL THERAPY | Age: 37
End: 2022-10-10
Payer: COMMERCIAL

## 2022-10-12 ENCOUNTER — HOSPITAL ENCOUNTER (OUTPATIENT)
Dept: PHYSICAL THERAPY | Age: 37
Discharge: HOME | End: 2022-10-12
Payer: COMMERCIAL

## 2022-10-12 PROCEDURE — 97110 THERAPEUTIC EXERCISES: CPT

## 2022-10-12 PROCEDURE — 97112 NEUROMUSCULAR REEDUCATION: CPT

## 2022-10-12 NOTE — PROGRESS NOTES
PT DAILY TREATMENT NOTE     Patient Name: Marguerite Estevez  Date:10/12/2022  : 1985  [x]  Patient  Verified  Payor: BLUE CROSS / Plan: Ifeoma Johnston [id number] PPO / Product Type: PPO /    In time:10:03  Out time:10:50  Total Treatment Time (min): 52  Visit #: 3 of 8    Medicare/BCBS Only   Total Timed Codes (min):  47 1:1 Treatment Time:  45       Treatment Area: Left shoulder pain [M25.512]    SUBJECTIVE  Pain Level (0-10 scale): 2  Any medication changes, allergies to medications, adverse drug reactions, diagnosis change, or new procedure performed?: [x] No    [] Yes (see summary sheet for update)  Subjective functional status/changes:   [] No changes reported  \"I have been able to work more on my usual exercises at home, my core is feeling better and I can get back into doing planks. \"    OBJECTIVE    15 min Therapeutic Exercise:  [x] See flow sheet :   Rationale: increase ROM and increase strength to improve the patients ability to perform ADLs with improved shoulder/elbow strength and mobility. 32 min Neuromuscular Re-education:  [x]  See flow sheet :   Rationale: increase ROM, increase strength, improve coordination, improve balance and increase proprioception  to improve the patients ability to perform overhead functional lifts with improved scapular stabilization and neutral cervical posture. With   [x] TE   [] TA   [x] neuro   [] other: Patient Education: [x] Review HEP    [x] Progressed/Changed HEP based on: proximity to discharge  [] positioning   [] body mechanics   [] transfers   [] heat/ice application    [] other:      Other Objective/Functional Measures:   -Updated HEP to include additional open chain RTC/scapular stability interventions     Pain Level (0-10 scale) post treatment: 0-1    ASSESSMENT/Changes in Function: Patient reporting gradual improvement in his ability to self manage left shoulder pain at home with previous HEP as his core discomfort eases.  His shoulder strength has improved over the past month leading to improved ease with overhead lifting and demonstrating techniques for art students. Patient will continue to benefit from skilled PT services to modify and progress therapeutic interventions, address functional mobility deficits, address ROM deficits, address strength deficits, analyze and address soft tissue restrictions, analyze and cue movement patterns, analyze and modify body mechanics/ergonomics, assess and modify postural abnormalities, address imbalance/dizziness and instruct in home and community integration to attain remaining goals. []  See Plan of Care  []  See progress note/recertification  []  See Discharge Summary         Progress towards goals / Updated goals:  Short Term Goals: To be accomplished in 1 week  - Goal: Pt to be compliant with initial HEP to improve shoulder range of motion and pain. Status at last note/certification: Established and reviewed with Pt  Current: met, pt reports compliance with HEP (9/26/22)  Long Term Goals: To be accomplished in 10 treatments  - Goal: Pt to demonstrate 5/5 shoulder flexion MMT and end range flexion of left shoulder to improve overhead lifting ability   Status at last note/certification: 4/5 shoulder flexion MMT at end range  Current: met, 5/5 (10/12/22)  - Goal: Pt to demonstrate 5/5 shoulder ER MMT of left shoulder to improve overhead lifting ability   Status at last note/certification: 4+/5   Current: met, 5/5 (10/12/22)  - Goal: Pt to report < 3/10 pain at worst to return to PLOF activities. Status at last note/certification: 2/89 pain at worst  Current: met, 2/10 pain at worst (10/12/22)  - Goal: Pt to report FOTO score of at least 78 pts to show improved function and quality of life.   Status at last note/certification: FOTO 70 pts   Current:     PLAN  [x]  Upgrade activities as tolerated     [x]  Continue plan of care  []  Update interventions per flow sheet       []  Discharge due to:_  []  Other:_      Weston Gonzales 10/12/2022  9:06 AM    Future Appointments   Date Time Provider Karishma Ma   10/12/2022 10:00 AM Judit [facility]   10/17/2022 11:30 AM Manon Fuelling [facility]

## 2022-10-17 ENCOUNTER — HOSPITAL ENCOUNTER (OUTPATIENT)
Dept: PHYSICAL THERAPY | Age: 37
Discharge: HOME | End: 2022-10-17
Payer: COMMERCIAL

## 2022-10-17 PROCEDURE — 97112 NEUROMUSCULAR REEDUCATION: CPT

## 2022-10-17 PROCEDURE — 97530 THERAPEUTIC ACTIVITIES: CPT

## 2022-10-17 NOTE — PROGRESS NOTES
PT DAILY TREATMENT NOTE     Patient Name: Ailyn Jhaveri  Date:10/17/2022  : 1985  [x]  Patient  Verified  Payor: BLUE CROSS / Plan: Ifeoma Johnston [id number] PPO / Product Type: PPO /    In time:11:34  Out time:12:30  Total Treatment Time (min): 56  Visit #: 4 of 8    Medicare/BCBS Only   Total Timed Codes (min):  44 1:1 Treatment Time:  40       Treatment Area: Left shoulder pain [M25.512]    SUBJECTIVE  Pain Level (0-10 scale): 4  Any medication changes, allergies to medications, adverse drug reactions, diagnosis change, or new procedure performed?: [x] No    [] Yes (see summary sheet for update)  Subjective functional status/changes:   [] No changes reported  \"I think I strained my back lifting a pumpkin the other day. \"    OBJECTIVE    Modality rationale: decrease pain and increase tissue extensibility to improve the patients ability to relax and sleep following therapy session to improve restorative sleep patterns.     Min Type Additional Details    [x] Estim:  []Unatt       []IFC  []Premod                        []Other:  []w/ice   []w/heat  Position:   Location:     [] Estim: []Att    []TENS instruct  []NMES                    []Other:  []w/US   []w/ice   []w/heat  Position:  Location:    []  Traction: [] Cervical       []Lumbar                       [] Prone          []Supine                       []Intermittent   []Continuous Lbs:  [] before manual  [] after manual    []  Ultrasound: []Continuous   [] Pulsed                           []1MHz   []3MHz W/cm2:  Location:    []  Iontophoresis with dexamethasone         Location: [] Take home patch   [] In clinic   10 + 2 []  Ice     [x]  heat  []  Ice massage  []  Laser   []  Anodyne Position: supine  Location: interscapular area    []  Laser with stim  []  Other:  Position:  Location:    []  Vasopneumatic Device    []  Right     []  Left  Pre-treatment girth:  Post-treatment girth:  Measured at (location):  Pressure:       [] lo [] med [] hi Temperature: [] lo [] med [] hi   [x] Skin assessment post-treatment:  [x]intact []redness- no adverse reaction    []redness - adverse reaction:       12 min Therapeutic Activity:  [x]  See flow sheet :   Rationale: increase ROM, increase strength, improve coordination, improve balance, and increase proprioception  to improve the patients ability to perform functional overhead/forward lifts. Patient education: Reed Cuff, progress toward goals, discharge planning     32 min Neuromuscular Re-education:  [x]  See flow sheet :   Rationale: increase ROM, increase strength, improve coordination, improve balance and increase proprioception  to improve the patients ability to perform overhead functional lifts with improved scapular stabilization and neutral cervical posture. With   [] TE   [] TA   [] neuro   [] other: Patient Education: [x] Review HEP    [] Progressed/Changed HEP based on:   [] positioning   [] body mechanics   [] transfers   [] heat/ice application    [] other:      Other Objective/Functional Measures:   Gross Shoulder AROM  Seated Flexion  170 170     Functional ER: symmetrical to mid thoracic spine  Functional IR: symmetrical to mid thoracic spine    Strength:   Right (/5) Left (/5)   GHJ   Flexion 5 5             Abduction 5 5             Extension 5 5             ER 5 5             IR 5 5   Upper Trapezius      Middle/Lower Trap 4+ 4+   Elbow Flexion 5 5   Elbow Extension 5 5     Pain Level (0-10 scale) post treatment: 0-1    ASSESSMENT/Changes in Function: Pt attended therapy for 4 visits for the treatment of left shoulder pain associated with exacerbation of left AC joint separation. At this point, the patient reports significant improvement since [facility] with specific improvements in the following areas: decreased intensity/frequency of pain, improved ability to manage pain with HEP, improved left shoulder strength, and improved ease with shoulder mobility.  The patient has also demonstrated improvement in his FOTO score from 70 pts to 81 pts, demonstrating improved function in the home and community. The patient is appropriate for discharge at this time with a comprehensive HEP and will follow up with our office about any questions that arise following discharge. Thank you for this referral.      Patient will continue to benefit from skilled PT services to modify and progress therapeutic interventions, address functional mobility deficits, address ROM deficits, address strength deficits, analyze and address soft tissue restrictions, analyze and cue movement patterns, analyze and modify body mechanics/ergonomics, assess and modify postural abnormalities, address imbalance/dizziness and instruct in home and community integration to attain remaining goals. []  See Plan of Care  []  See progress note/recertification  []  See Discharge Summary         Progress towards goals / Updated goals:  Short Term Goals: To be accomplished in 1 week  - Goal: Pt to be compliant with initial HEP to improve shoulder range of motion and pain. Status at last note/certification: Established and reviewed with Pt  Current: met, pt reports compliance with HEP (9/26/22)  Long Term Goals: To be accomplished in 10 treatments  - Goal: Pt to demonstrate 5/5 shoulder flexion MMT and end range flexion of left shoulder to improve overhead lifting ability   Status at last note/certification: 4/5 shoulder flexion MMT at end range  Current: met, 5/5 (10/12/22)  - Goal: Pt to demonstrate 5/5 shoulder ER MMT of left shoulder to improve overhead lifting ability   Status at last note/certification: 4+/5   Current: met, 5/5 (10/12/22)  - Goal: Pt to report < 3/10 pain at worst to return to PLOF activities. Status at last note/certification: 4/07 pain at worst  Current: met, 2/10 pain at worst (10/12/22)  - Goal: Pt to report FOTO score of at least 78 pts to show improved function and quality of life.   Status at last note/certification: FOTO 70 pts   Current: met, FOTO 81 pts (10/17/22)    PLAN  []  Upgrade activities as tolerated     []  Continue plan of care  []  Update interventions per flow sheet       [x]  Discharge due to: I with HEP  []  Other:_      Vibha Diana 10/17/2022  9:06 AM    Future Appointments   Date Time Provider Karishma Ma   10/17/2022 11:30 AM Karl Kraus [facility]

## 2022-10-17 NOTE — PROGRESS NOTES
[facility] AT [address]. [address], [place] [address]  Phone: [phone] Fax [phone] SUMMARY  Patient Name: Phoenix Booker : 1985   Treatment/Medical Diagnosis: Left shoulder pain [M25.512]   Referral Source: Jhoana Clark NP     Date of Initial Visit: 22 Attended Visits: 4 Missed Visits: 1     SUMMARY OF TREATMENT  Pt is a pleasant 39 y.o. male who presents with c/o left shoulder pain. Treatment has consisted of: therapeutic exercise to improved UE strength/mobility; therapeutic activities to improve forward/overhead reach/lift; neuromuscular re-education to improve scapular stability, cervical stability, and UE movement patterns; physical agent/modality for symptom management;manual therapy for symptom relief and improved UE/cervical mobility; patient education to improve symptom management; self Care training; and functional mobility training. CURRENT STATUS  Pt attended therapy for 4 visits for the treatment of left shoulder pain associated with exacerbation of left AC joint separation. At this point, the patient reports significant improvement since [facility] with specific improvements in the following areas: decreased intensity/frequency of pain, improved ability to manage pain with HEP, improved left shoulder strength, and improved ease with shoulder mobility. The patient has also demonstrated improvement in his FOTO score from 70 pts to 81 pts, demonstrating improved function in the home and community. The patient is appropriate for discharge at this time with a comprehensive HEP and will follow up with our office about any questions that arise following discharge.  Thank you for this referral.    Gross Shoulder AROM  Seated Flexion  170 170      Functional ER: symmetrical to mid thoracic spine  Functional IR: symmetrical to mid thoracic spine     Strength:    Right (/5) Left (/5)   GHJ   Flexion 5 5 Abduction 5 5             Extension 5 5             ER 5 5             IR 5 5   Upper Trapezius        Middle/Lower Trap 4+ 4+   Elbow Flexion 5 5   Elbow Extension 5 5        Short Term Goals: To be accomplished in 1 week  - Goal: Pt to be compliant with initial HEP to improve shoulder range of motion and pain. Status at last note/certification: Established and reviewed with Pt  Current: met, pt reports compliance with HEP (9/26/22)  Long Term Goals: To be accomplished in 10 treatments  - Goal: Pt to demonstrate 5/5 shoulder flexion MMT and end range flexion of left shoulder to improve overhead lifting ability   Status at last note/certification: 4/5 shoulder flexion MMT at end range  Current: met, 5/5 (10/12/22)  - Goal: Pt to demonstrate 5/5 shoulder ER MMT of left shoulder to improve overhead lifting ability   Status at last note/certification: 4+/5   Current: met, 5/5 (10/12/22)  - Goal: Pt to report < 3/10 pain at worst to return to PLOF activities. Status at last note/certification: 3/72 pain at worst  Current: met, 2/10 pain at worst (10/12/22)  - Goal: Pt to report FOTO score of at least 78 pts to show improved function and quality of life. Status at last note/certification: FOTO 70 pts   Current: met, FOTO 81 pts (10/17/22)    RECOMMENDATIONS  Discontinue therapy. Progressing towards or have reached established goals. If you have any questions/comments please contact us directly at [phone]. Thank you for allowing us to assist in the care of your patient.   Therapist Signature: Roger Hernández Date: 10/17/22   Reporting Period: 9/19/22-10/17/22 Time: 2:14 PM

## 2022-10-17 NOTE — PROGRESS NOTES
Physical Therapy Discharge Instructions      [facility] - [address] [facility]  [phone] fax    Patient: Cheryl Solo  : 1985    Continue Home Exercise Program 2 times per week. Continue with    [] Ice  as needed 2 times per day    [x] Heat       Follow up with MD:     [] Upon completion of therapy     [x] As needed      Recommendations:     [x]   Return to activity with home program    []   Return to activity with the following modifications:       []Post Rehab Program    []Join Independent aquatic program     []Return to/join local gym    Additional Comments: Asia Chaudhary, it has been a pleasure working with you (again!). Keep up the good work and please reach out in the future if you have any questions. Take care!     Cisco Sunshine PT, DPT 10/17/2022 11:49 AM

## 2023-04-07 DIAGNOSIS — F41.9 ANXIETY DISORDER, UNSPECIFIED: ICD-10-CM

## 2023-04-09 RX ORDER — ESCITALOPRAM OXALATE 10 MG/1
TABLET ORAL
Qty: 90 TABLET | Refills: 0 | Status: SHIPPED | OUTPATIENT
Start: 2023-04-09

## 2023-06-29 ENCOUNTER — OFFICE VISIT (OUTPATIENT)
Facility: CLINIC | Age: 38
End: 2023-06-29
Payer: COMMERCIAL

## 2023-06-29 VITALS
HEART RATE: 69 BPM | TEMPERATURE: 98 F | DIASTOLIC BLOOD PRESSURE: 78 MMHG | SYSTOLIC BLOOD PRESSURE: 114 MMHG | WEIGHT: 192 LBS | BODY MASS INDEX: 26.78 KG/M2 | OXYGEN SATURATION: 96 %

## 2023-06-29 DIAGNOSIS — Z76.89 ESTABLISHING CARE WITH NEW DOCTOR, ENCOUNTER FOR: Primary | ICD-10-CM

## 2023-06-29 DIAGNOSIS — J45.20 MILD INTERMITTENT ASTHMA WITHOUT COMPLICATION: ICD-10-CM

## 2023-06-29 DIAGNOSIS — F41.9 ANXIETY DISORDER, UNSPECIFIED TYPE: ICD-10-CM

## 2023-06-29 DIAGNOSIS — B35.4 TINEA CORPORIS: ICD-10-CM

## 2023-06-29 PROCEDURE — 99213 OFFICE O/P EST LOW 20 MIN: CPT | Performed by: INTERNAL MEDICINE

## 2023-06-29 RX ORDER — KETOCONAZOLE 20 MG/G
CREAM TOPICAL
Qty: 30 G | Refills: 1 | Status: SHIPPED | OUTPATIENT
Start: 2023-06-29

## 2023-06-29 RX ORDER — ALBUTEROL SULFATE 90 UG/1
AEROSOL, METERED RESPIRATORY (INHALATION)
Qty: 18 G | Refills: 1 | Status: SHIPPED | OUTPATIENT
Start: 2023-06-29

## 2023-06-29 RX ORDER — ESCITALOPRAM OXALATE 10 MG/1
10 TABLET ORAL DAILY
Qty: 90 TABLET | Refills: 0 | Status: SHIPPED | OUTPATIENT
Start: 2023-06-29

## 2023-06-29 ASSESSMENT — PATIENT HEALTH QUESTIONNAIRE - PHQ9
SUM OF ALL RESPONSES TO PHQ QUESTIONS 1-9: 0
SUM OF ALL RESPONSES TO PHQ QUESTIONS 1-9: 0
SUM OF ALL RESPONSES TO PHQ9 QUESTIONS 1 & 2: 0
SUM OF ALL RESPONSES TO PHQ QUESTIONS 1-9: 0
1. LITTLE INTEREST OR PLEASURE IN DOING THINGS: 0
2. FEELING DOWN, DEPRESSED OR HOPELESS: 0
SUM OF ALL RESPONSES TO PHQ QUESTIONS 1-9: 0

## 2023-06-29 ASSESSMENT — ENCOUNTER SYMPTOMS
COUGH: 0
CONSTIPATION: 0
DIARRHEA: 0
SHORTNESS OF BREATH: 0
ABDOMINAL PAIN: 0

## 2023-09-24 DIAGNOSIS — F41.9 ANXIETY DISORDER, UNSPECIFIED TYPE: ICD-10-CM

## 2023-09-26 RX ORDER — ESCITALOPRAM OXALATE 10 MG/1
10 TABLET ORAL DAILY
Qty: 60 TABLET | Refills: 0 | Status: SHIPPED | OUTPATIENT
Start: 2023-09-26 | End: 2023-10-02 | Stop reason: SDUPTHER

## 2023-10-02 ENCOUNTER — OFFICE VISIT (OUTPATIENT)
Facility: CLINIC | Age: 38
End: 2023-10-02
Payer: COMMERCIAL

## 2023-10-02 VITALS
TEMPERATURE: 97.7 F | HEART RATE: 71 BPM | RESPIRATION RATE: 18 BRPM | SYSTOLIC BLOOD PRESSURE: 116 MMHG | OXYGEN SATURATION: 98 % | BODY MASS INDEX: 27.33 KG/M2 | DIASTOLIC BLOOD PRESSURE: 84 MMHG | HEIGHT: 71 IN | WEIGHT: 195.2 LBS

## 2023-10-02 DIAGNOSIS — Z11.4 SCREENING FOR HIV (HUMAN IMMUNODEFICIENCY VIRUS): Primary | ICD-10-CM

## 2023-10-02 DIAGNOSIS — F41.9 ANXIETY DISORDER, UNSPECIFIED TYPE: ICD-10-CM

## 2023-10-02 PROCEDURE — 99213 OFFICE O/P EST LOW 20 MIN: CPT | Performed by: INTERNAL MEDICINE

## 2023-10-02 RX ORDER — ESCITALOPRAM OXALATE 10 MG/1
10 TABLET ORAL DAILY
Qty: 120 TABLET | Refills: 0 | Status: SHIPPED | OUTPATIENT
Start: 2023-10-02

## 2023-10-02 SDOH — ECONOMIC STABILITY: FOOD INSECURITY: WITHIN THE PAST 12 MONTHS, THE FOOD YOU BOUGHT JUST DIDN'T LAST AND YOU DIDN'T HAVE MONEY TO GET MORE.: NEVER TRUE

## 2023-10-02 SDOH — ECONOMIC STABILITY: HOUSING INSECURITY
IN THE LAST 12 MONTHS, WAS THERE A TIME WHEN YOU DID NOT HAVE A STEADY PLACE TO SLEEP OR SLEPT IN A SHELTER (INCLUDING NOW)?: NO

## 2023-10-02 SDOH — ECONOMIC STABILITY: INCOME INSECURITY: HOW HARD IS IT FOR YOU TO PAY FOR THE VERY BASICS LIKE FOOD, HOUSING, MEDICAL CARE, AND HEATING?: NOT HARD AT ALL

## 2023-10-02 SDOH — ECONOMIC STABILITY: FOOD INSECURITY: WITHIN THE PAST 12 MONTHS, YOU WORRIED THAT YOUR FOOD WOULD RUN OUT BEFORE YOU GOT MONEY TO BUY MORE.: NEVER TRUE

## 2023-10-02 ASSESSMENT — PATIENT HEALTH QUESTIONNAIRE - PHQ9
8. MOVING OR SPEAKING SO SLOWLY THAT OTHER PEOPLE COULD HAVE NOTICED. OR THE OPPOSITE, BEING SO FIGETY OR RESTLESS THAT YOU HAVE BEEN MOVING AROUND A LOT MORE THAN USUAL: 0
1. LITTLE INTEREST OR PLEASURE IN DOING THINGS: 1
9. THOUGHTS THAT YOU WOULD BE BETTER OFF DEAD, OR OF HURTING YOURSELF: 0
SUM OF ALL RESPONSES TO PHQ QUESTIONS 1-9: 2
6. FEELING BAD ABOUT YOURSELF - OR THAT YOU ARE A FAILURE OR HAVE LET YOURSELF OR YOUR FAMILY DOWN: 0
SUM OF ALL RESPONSES TO PHQ QUESTIONS 1-9: 2
SUM OF ALL RESPONSES TO PHQ QUESTIONS 1-9: 2
10. IF YOU CHECKED OFF ANY PROBLEMS, HOW DIFFICULT HAVE THESE PROBLEMS MADE IT FOR YOU TO DO YOUR WORK, TAKE CARE OF THINGS AT HOME, OR GET ALONG WITH OTHER PEOPLE: 0
4. FEELING TIRED OR HAVING LITTLE ENERGY: 0
SUM OF ALL RESPONSES TO PHQ9 QUESTIONS 1 & 2: 2
3. TROUBLE FALLING OR STAYING ASLEEP: 0
7. TROUBLE CONCENTRATING ON THINGS, SUCH AS READING THE NEWSPAPER OR WATCHING TELEVISION: 0
SUM OF ALL RESPONSES TO PHQ QUESTIONS 1-9: 2
5. POOR APPETITE OR OVEREATING: 0
2. FEELING DOWN, DEPRESSED OR HOPELESS: 1

## 2023-10-02 ASSESSMENT — ENCOUNTER SYMPTOMS
CONSTIPATION: 0
COUGH: 0
SHORTNESS OF BREATH: 0
DIARRHEA: 0
ABDOMINAL PAIN: 0

## 2023-10-02 NOTE — PROGRESS NOTES
1. \"Have you been to the ER, urgent care clinic since your last visit? Hospitalized since your last visit? \" No    2. \"Have you seen or consulted any other health care providers outside of the [address] [place] [address] since your last visit? \" Yes therapist      3. For patients aged 43-73: Has the patient had a colonoscopy / FIT/ Cologuard? NA - based on age      If the patient is female:    4. For patients aged 43-66: Has the patient had a mammogram within the past 2 years? NA - based on age or sex      11. For patients aged 21-65: Has the patient had a pap smear?  NA - based on age or sex

## 2024-01-24 ENCOUNTER — OFFICE VISIT (OUTPATIENT)
Facility: CLINIC | Age: 39
End: 2024-01-24
Payer: COMMERCIAL

## 2024-01-24 VITALS
HEIGHT: 71 IN | BODY MASS INDEX: 27.72 KG/M2 | TEMPERATURE: 97.6 F | HEART RATE: 75 BPM | OXYGEN SATURATION: 99 % | RESPIRATION RATE: 16 BRPM | DIASTOLIC BLOOD PRESSURE: 76 MMHG | WEIGHT: 198 LBS | SYSTOLIC BLOOD PRESSURE: 102 MMHG

## 2024-01-24 DIAGNOSIS — F41.9 ANXIETY DISORDER, UNSPECIFIED TYPE: Primary | ICD-10-CM

## 2024-01-24 PROCEDURE — 99213 OFFICE O/P EST LOW 20 MIN: CPT | Performed by: INTERNAL MEDICINE

## 2024-01-24 RX ORDER — M-VIT,TX,IRON,MINS/CALC/FOLIC 27MG-0.4MG
1 TABLET ORAL DAILY
COMMUNITY

## 2024-01-24 RX ORDER — ESCITALOPRAM OXALATE 10 MG/1
10 TABLET ORAL DAILY
Qty: 90 TABLET | Refills: 0 | Status: SHIPPED | OUTPATIENT
Start: 2024-01-24

## 2024-01-24 ASSESSMENT — PATIENT HEALTH QUESTIONNAIRE - PHQ9
SUM OF ALL RESPONSES TO PHQ QUESTIONS 1-9: 0
6. FEELING BAD ABOUT YOURSELF - OR THAT YOU ARE A FAILURE OR HAVE LET YOURSELF OR YOUR FAMILY DOWN: 0
4. FEELING TIRED OR HAVING LITTLE ENERGY: 0
3. TROUBLE FALLING OR STAYING ASLEEP: 0
2. FEELING DOWN, DEPRESSED OR HOPELESS: 0
SUM OF ALL RESPONSES TO PHQ9 QUESTIONS 1 & 2: 0
SUM OF ALL RESPONSES TO PHQ QUESTIONS 1-9: 0
1. LITTLE INTEREST OR PLEASURE IN DOING THINGS: 0
9. THOUGHTS THAT YOU WOULD BE BETTER OFF DEAD, OR OF HURTING YOURSELF: 0
8. MOVING OR SPEAKING SO SLOWLY THAT OTHER PEOPLE COULD HAVE NOTICED. OR THE OPPOSITE, BEING SO FIGETY OR RESTLESS THAT YOU HAVE BEEN MOVING AROUND A LOT MORE THAN USUAL: 0
7. TROUBLE CONCENTRATING ON THINGS, SUCH AS READING THE NEWSPAPER OR WATCHING TELEVISION: 0
10. IF YOU CHECKED OFF ANY PROBLEMS, HOW DIFFICULT HAVE THESE PROBLEMS MADE IT FOR YOU TO DO YOUR WORK, TAKE CARE OF THINGS AT HOME, OR GET ALONG WITH OTHER PEOPLE: 0
5. POOR APPETITE OR OVEREATING: 0

## 2024-01-24 ASSESSMENT — ENCOUNTER SYMPTOMS
ABDOMINAL PAIN: 0
SHORTNESS OF BREATH: 0

## 2024-01-24 ASSESSMENT — ANXIETY QUESTIONNAIRES
5. BEING SO RESTLESS THAT IT IS HARD TO SIT STILL: 0
IF YOU CHECKED OFF ANY PROBLEMS ON THIS QUESTIONNAIRE, HOW DIFFICULT HAVE THESE PROBLEMS MADE IT FOR YOU TO DO YOUR WORK, TAKE CARE OF THINGS AT HOME, OR GET ALONG WITH OTHER PEOPLE: NOT DIFFICULT AT ALL
1. FEELING NERVOUS, ANXIOUS, OR ON EDGE: 1
4. TROUBLE RELAXING: 1
GAD7 TOTAL SCORE: 3
7. FEELING AFRAID AS IF SOMETHING AWFUL MIGHT HAPPEN: 0
2. NOT BEING ABLE TO STOP OR CONTROL WORRYING: 0
6. BECOMING EASILY ANNOYED OR IRRITABLE: 1
3. WORRYING TOO MUCH ABOUT DIFFERENT THINGS: 0

## 2024-01-24 NOTE — PROGRESS NOTES
1. \"Have you been to the ER, urgent care clinic since your last visit?  Hospitalized since your last visit?\" No    2. \"Have you seen or consulted any other health care providers outside of the [facility] since your last visit?\" No     3. For patients aged 45-75: Has the patient had a colonoscopy / FIT/ Cologuard? NA - based on age      If the patient is female:    4. For patients aged 40-74: Has the patient had a mammogram within the past 2 years? NA - based on age or sex      5. For patients aged 21-65: Has the patient had a pap smear? NA - based on age or sex

## 2024-01-24 NOTE — PROGRESS NOTES
Subjective:      Patient ID: Liborio Ruiz is a 38 y.o. male.    Follow up    Patient is coming for routine follow-up controlled anxiety.  He is seeing his therapist every 2 weeks.  He feels anxiety is controlled.  He is sleeping well most of the nights he is feeling rested most of the days.  His energy is good.  He had denies New Year's colt with family and friends at home.  He has no major complaints or concerns today.    PHQ-9 Total Score: 0 (1/24/2024  9:30 AM)  Thoughts that you would be better off dead, or of hurting yourself in some way: 0 (1/24/2024  9:30 AM)  Next appointment annual physical.        Anxiety disorder  PHQ-9 Total Score: 0 (6/29/2023 11:15 AM)  PHQ-9 Total Score: 2 (10/2/2023  1:11 PM)  Thoughts that you would be better off dead, or of hurting yourself in some way: 0 (10/2/2023  1:11 PM)  PHQ-9 Total Score: 0 (1/24/2024  9:30 AM)  Thoughts that you would be better off dead, or of hurting yourself in some way: 0 (1/24/2024  9:30 AM)  Therapy every other week, Kody Stevenson.  Therapy and Lexapro 10  mg daily.  Symptoms controlled     Hx of 2021 Left shoulder ligament partial tear s/p surgery     Hx of 08/2022 Umbilical hernia s/p surgical repair        PAST MEDICAL HISTORY  Medical: as listed.  Surgical: as listed.  Allergies: as listed.  Medication: as listed.  Family: parents skin cancer. Dad HTN.  Work: artist.   Social: tobacco denied, [place] socially, recreational drugs edible marihuana occasionally.   Sexual:  female, 2 children, STI herpes.      Anxiety  Patient reports no chest pain or shortness of breath.           Review of Systems   Constitutional:  Negative for chills and fever.   Respiratory:  Negative for shortness of breath.    Cardiovascular:  Negative for chest pain.   Gastrointestinal:  Negative for abdominal pain.       Objective:   Visit Vitals  /76 (Site: Left Upper Arm, Position: Sitting, Cuff Size: Medium Adult)   Pulse 75   Temp 97.6 °F (36.4 °C) (Temporal)

## 2024-04-17 ENCOUNTER — HOSPITAL ENCOUNTER (OUTPATIENT)
Facility: HOSPITAL | Age: 39
Setting detail: SPECIMEN
Discharge: HOME | End: 2024-04-20
Payer: COMMERCIAL

## 2024-04-17 ENCOUNTER — OFFICE VISIT (OUTPATIENT)
Facility: CLINIC | Age: 39
End: 2024-04-17
Payer: COMMERCIAL

## 2024-04-17 VITALS
HEIGHT: 71 IN | HEART RATE: 83 BPM | BODY MASS INDEX: 27.86 KG/M2 | WEIGHT: 199 LBS | DIASTOLIC BLOOD PRESSURE: 82 MMHG | SYSTOLIC BLOOD PRESSURE: 117 MMHG | RESPIRATION RATE: 12 BRPM | TEMPERATURE: 97.2 F | OXYGEN SATURATION: 96 %

## 2024-04-17 DIAGNOSIS — Z13.228 SCREENING FOR METABOLIC DISORDER: ICD-10-CM

## 2024-04-17 DIAGNOSIS — Z13.6 ENCOUNTER FOR SCREENING FOR CORONARY ARTERY DISEASE: ICD-10-CM

## 2024-04-17 DIAGNOSIS — Z13.1 SCREENING FOR DIABETES MELLITUS (DM): ICD-10-CM

## 2024-04-17 DIAGNOSIS — F41.9 ANXIETY AND DEPRESSION: ICD-10-CM

## 2024-04-17 DIAGNOSIS — Z13.0 SCREENING FOR IRON DEFICIENCY ANEMIA: ICD-10-CM

## 2024-04-17 DIAGNOSIS — Z00.00 ANNUAL PHYSICAL EXAM: Primary | ICD-10-CM

## 2024-04-17 DIAGNOSIS — Z13.29 SCREENING FOR THYROID DISORDER: ICD-10-CM

## 2024-04-17 DIAGNOSIS — Z11.4 SCREENING FOR HIV (HUMAN IMMUNODEFICIENCY VIRUS): ICD-10-CM

## 2024-04-17 DIAGNOSIS — F32.A ANXIETY AND DEPRESSION: ICD-10-CM

## 2024-04-17 LAB
ALBUMIN SERPL-MCNC: 4.3 G/DL (ref 3.4–5)
ALBUMIN/GLOB SERPL: 1.4 (ref 0.8–1.7)
ALP SERPL-CCNC: 62 U/L (ref 45–117)
ALT SERPL-CCNC: 34 U/L (ref 16–61)
ANION GAP SERPL CALC-SCNC: 7 MMOL/L (ref 3–18)
AST SERPL-CCNC: 21 U/L (ref 10–38)
BASOPHILS # BLD: 0 K/UL (ref 0–0.1)
BASOPHILS NFR BLD: 0 % (ref 0–2)
BILIRUB SERPL-MCNC: 0.8 MG/DL (ref 0.2–1)
BUN SERPL-MCNC: 12 MG/DL (ref 7–18)
BUN/CREAT SERPL: 13 (ref 12–20)
CALCIUM SERPL-MCNC: 9.3 MG/DL (ref 8.5–10.1)
CHLORIDE SERPL-SCNC: 104 MMOL/L (ref 100–111)
CHOLEST SERPL-MCNC: 181 MG/DL
CO2 SERPL-SCNC: 27 MMOL/L (ref 21–32)
CREAT SERPL-MCNC: 0.92 MG/DL (ref 0.6–1.3)
DIFFERENTIAL METHOD BLD: ABNORMAL
EOSINOPHIL # BLD: 0.1 K/UL (ref 0–0.4)
EOSINOPHIL NFR BLD: 0 % (ref 0–5)
ERYTHROCYTE [DISTWIDTH] IN BLOOD BY AUTOMATED COUNT: 12.8 % (ref 11.6–14.5)
EST. AVERAGE GLUCOSE BLD GHB EST-MCNC: 94 MG/DL
GLOBULIN SER CALC-MCNC: 3 G/DL (ref 2–4)
GLUCOSE SERPL-MCNC: 75 MG/DL (ref 74–99)
HBA1C MFR BLD: 4.9 % (ref 4.2–5.6)
HCT VFR BLD AUTO: 46.5 % (ref 36–48)
HDLC SERPL-MCNC: 43 MG/DL (ref 40–60)
HDLC SERPL: 4.2 (ref 0–5)
HGB BLD-MCNC: 16.3 G/DL (ref 13–16)
IMM GRANULOCYTES # BLD AUTO: 0 K/UL (ref 0–0.04)
IMM GRANULOCYTES NFR BLD AUTO: 0 % (ref 0–0.5)
LDLC SERPL CALC-MCNC: 112.4 MG/DL (ref 0–100)
LIPID PANEL: ABNORMAL
LYMPHOCYTES # BLD: 1.6 K/UL (ref 0.9–3.6)
LYMPHOCYTES NFR BLD: 14 % (ref 21–52)
MCH RBC QN AUTO: 30.4 PG (ref 24–34)
MCHC RBC AUTO-ENTMCNC: 35.1 G/DL (ref 31–37)
MCV RBC AUTO: 86.8 FL (ref 78–100)
MONOCYTES # BLD: 0.6 K/UL (ref 0.05–1.2)
MONOCYTES NFR BLD: 5 % (ref 3–10)
NEUTS SEG # BLD: 9.2 K/UL (ref 1.8–8)
NEUTS SEG NFR BLD: 80 % (ref 40–73)
NRBC # BLD: 0 K/UL (ref 0–0.01)
NRBC BLD-RTO: 0 PER 100 WBC
PLATELET # BLD AUTO: 222 K/UL (ref 135–420)
PMV BLD AUTO: 9 FL (ref 9.2–11.8)
POTASSIUM SERPL-SCNC: 4 MMOL/L (ref 3.5–5.5)
PROT SERPL-MCNC: 7.3 G/DL (ref 6.4–8.2)
RBC # BLD AUTO: 5.36 M/UL (ref 4.35–5.65)
SODIUM SERPL-SCNC: 138 MMOL/L (ref 136–145)
T4 FREE SERPL-MCNC: 1 NG/DL (ref 0.7–1.5)
TRIGL SERPL-MCNC: 128 MG/DL
TSH SERPL DL<=0.05 MIU/L-ACNC: 1.58 UIU/ML (ref 0.36–3.74)
VLDLC SERPL CALC-MCNC: 25.6 MG/DL
WBC # BLD AUTO: 11.5 K/UL (ref 4.6–13.2)

## 2024-04-17 PROCEDURE — 83036 HEMOGLOBIN GLYCOSYLATED A1C: CPT

## 2024-04-17 PROCEDURE — 84439 ASSAY OF FREE THYROXINE: CPT

## 2024-04-17 PROCEDURE — 84443 ASSAY THYROID STIM HORMONE: CPT

## 2024-04-17 PROCEDURE — 36415 COLL VENOUS BLD VENIPUNCTURE: CPT

## 2024-04-17 PROCEDURE — 85025 COMPLETE CBC W/AUTO DIFF WBC: CPT

## 2024-04-17 PROCEDURE — 80061 LIPID PANEL: CPT

## 2024-04-17 PROCEDURE — 99213 OFFICE O/P EST LOW 20 MIN: CPT | Performed by: INTERNAL MEDICINE

## 2024-04-17 PROCEDURE — 99395 PREV VISIT EST AGE 18-39: CPT | Performed by: INTERNAL MEDICINE

## 2024-04-17 PROCEDURE — 80053 COMPREHEN METABOLIC PANEL: CPT

## 2024-04-17 PROCEDURE — 87389 HIV-1 AG W/HIV-1&-2 AB AG IA: CPT

## 2024-04-17 RX ORDER — ESCITALOPRAM OXALATE 10 MG/1
10 TABLET ORAL DAILY
Qty: 90 TABLET | Refills: 0 | Status: SHIPPED | OUTPATIENT
Start: 2024-04-17

## 2024-04-17 ASSESSMENT — PATIENT HEALTH QUESTIONNAIRE - PHQ9
4. FEELING TIRED OR HAVING LITTLE ENERGY: NOT AT ALL
SUM OF ALL RESPONSES TO PHQ QUESTIONS 1-9: 0
9. THOUGHTS THAT YOU WOULD BE BETTER OFF DEAD, OR OF HURTING YOURSELF: NOT AT ALL
5. POOR APPETITE OR OVEREATING: NOT AT ALL
8. MOVING OR SPEAKING SO SLOWLY THAT OTHER PEOPLE COULD HAVE NOTICED. OR THE OPPOSITE, BEING SO FIGETY OR RESTLESS THAT YOU HAVE BEEN MOVING AROUND A LOT MORE THAN USUAL: NOT AT ALL
10. IF YOU CHECKED OFF ANY PROBLEMS, HOW DIFFICULT HAVE THESE PROBLEMS MADE IT FOR YOU TO DO YOUR WORK, TAKE CARE OF THINGS AT HOME, OR GET ALONG WITH OTHER PEOPLE: NOT DIFFICULT AT ALL
SUM OF ALL RESPONSES TO PHQ QUESTIONS 1-9: 0
SUM OF ALL RESPONSES TO PHQ QUESTIONS 1-9: 0
1. LITTLE INTEREST OR PLEASURE IN DOING THINGS: NOT AT ALL
6. FEELING BAD ABOUT YOURSELF - OR THAT YOU ARE A FAILURE OR HAVE LET YOURSELF OR YOUR FAMILY DOWN: NOT AT ALL
2. FEELING DOWN, DEPRESSED OR HOPELESS: NOT AT ALL
3. TROUBLE FALLING OR STAYING ASLEEP: NOT AT ALL
SUM OF ALL RESPONSES TO PHQ9 QUESTIONS 1 & 2: 0
SUM OF ALL RESPONSES TO PHQ QUESTIONS 1-9: 0
7. TROUBLE CONCENTRATING ON THINGS, SUCH AS READING THE NEWSPAPER OR WATCHING TELEVISION: NOT AT ALL

## 2024-04-17 ASSESSMENT — ANXIETY QUESTIONNAIRES
4. TROUBLE RELAXING: SEVERAL DAYS
5. BEING SO RESTLESS THAT IT IS HARD TO SIT STILL: SEVERAL DAYS
IF YOU CHECKED OFF ANY PROBLEMS ON THIS QUESTIONNAIRE, HOW DIFFICULT HAVE THESE PROBLEMS MADE IT FOR YOU TO DO YOUR WORK, TAKE CARE OF THINGS AT HOME, OR GET ALONG WITH OTHER PEOPLE: SOMEWHAT DIFFICULT
6. BECOMING EASILY ANNOYED OR IRRITABLE: SEVERAL DAYS
GAD7 TOTAL SCORE: 4
7. FEELING AFRAID AS IF SOMETHING AWFUL MIGHT HAPPEN: NOT AT ALL
3. WORRYING TOO MUCH ABOUT DIFFERENT THINGS: NOT AT ALL
2. NOT BEING ABLE TO STOP OR CONTROL WORRYING: NOT AT ALL
1. FEELING NERVOUS, ANXIOUS, OR ON EDGE: SEVERAL DAYS

## 2024-04-17 ASSESSMENT — ENCOUNTER SYMPTOMS: SHORTNESS OF BREATH: 0

## 2024-04-17 NOTE — PROGRESS NOTES
\"Have you been to the ER, urgent care clinic since your last visit?  Hospitalized since your last visit?\"    NO    “Have you seen or consulted any other health care providers outside of [facility] since your last visit?”    NO            Click Here for Release of Records Request

## 2024-04-17 NOTE — PROGRESS NOTES
Subjective:      Patient ID: Liborio Ruiz is a 38 y.o. male.    Annual physical    Patient coming today for his annual physical.  Denies fever, chills, sweats, chest pain or shortness of breath.  He had an evaluation by [facility] behavioral health.  Evaluated by psychologist, he is analysis was inconclusive for ADHD.  He does have some problems with memory seems to be due to distractions.  Other than that he is doing very well on very high scored in other parameters.  He did not have problems with high school, him for college and completed college.  He has a masters.  He is a teacher at [employer].  He helps at home with the kids and his partner.  He manage time and tasks very well.  He reads 600 pages book.  To my evaluation, patient does not have ADHD.    Anxiety and depression is controlled.  PHQ-9 Total Score: 0 (4/17/2024  9:55 AM)  Thoughts that you would be better off dead, or of hurting yourself in some way: 0 (4/17/2024  9:55 AM)  To continue Lexapro.  No adjustments.          Depression and anxiety disorder  PHQ-9 Total Score: 0 (6/29/2023 11:15 AM)  PHQ-9 Total Score: 2 (10/2/2023  1:11 PM)  Thoughts that you would be better off dead, or of hurting yourself in some way: 0 (10/2/2023  1:11 PM)  PHQ-9 Total Score: 0 (1/24/2024  9:30 AM)  Thoughts that you would be better off dead, or of hurting yourself in some way: 0 (1/24/2024  9:30 AM)  PHQ-9 Total Score: 0 (4/17/2024  9:55 AM)  Thoughts that you would be better off dead, or of hurting yourself in some way: 0 (4/17/2024  9:55 AM)  Therapy every other week, Kody Stevenson.  Therapy and Lexapro 10  mg daily.  Symptoms controlled     Hx of 2021 Left shoulder ligament partial tear s/p surgery     Hx of 08/2022 Umbilical hernia s/p surgical repair        PAST MEDICAL HISTORY  Medical: as listed.  Surgical: as listed.  Allergies: as listed.  Medication: as listed.  Family: parents skin cancer. Dad HTN.  Work: artist.   Social: tobacco denied, [place] socially,

## 2024-04-18 LAB
HIV 1+2 AB+HIV1 P24 AG SERPL QL IA: NONREACTIVE
HIV 1/2 RESULT COMMENT: NORMAL

## 2024-04-21 DIAGNOSIS — D75.1 POLYCYTHEMIA: Primary | ICD-10-CM

## 2024-05-23 ENCOUNTER — OFFICE VISIT (OUTPATIENT)
Facility: CLINIC | Age: 39
End: 2024-05-23

## 2024-05-23 VITALS
HEIGHT: 71 IN | DIASTOLIC BLOOD PRESSURE: 76 MMHG | OXYGEN SATURATION: 95 % | RESPIRATION RATE: 12 BRPM | TEMPERATURE: 97.3 F | WEIGHT: 202.6 LBS | BODY MASS INDEX: 28.36 KG/M2 | HEART RATE: 75 BPM | SYSTOLIC BLOOD PRESSURE: 126 MMHG

## 2024-05-23 DIAGNOSIS — T14.8XXA OPEN WOUND: Primary | ICD-10-CM

## 2024-05-23 ASSESSMENT — PATIENT HEALTH QUESTIONNAIRE - PHQ9
SUM OF ALL RESPONSES TO PHQ QUESTIONS 1-9: 0
5. POOR APPETITE OR OVEREATING: NOT AT ALL
SUM OF ALL RESPONSES TO PHQ QUESTIONS 1-9: 0
4. FEELING TIRED OR HAVING LITTLE ENERGY: NOT AT ALL
SUM OF ALL RESPONSES TO PHQ QUESTIONS 1-9: 0
2. FEELING DOWN, DEPRESSED OR HOPELESS: NOT AT ALL
SUM OF ALL RESPONSES TO PHQ QUESTIONS 1-9: 0
7. TROUBLE CONCENTRATING ON THINGS, SUCH AS READING THE NEWSPAPER OR WATCHING TELEVISION: NOT AT ALL
SUM OF ALL RESPONSES TO PHQ9 QUESTIONS 1 & 2: 0
9. THOUGHTS THAT YOU WOULD BE BETTER OFF DEAD, OR OF HURTING YOURSELF: NOT AT ALL
SUM OF ALL RESPONSES TO PHQ QUESTIONS 1-9: 0
SUM OF ALL RESPONSES TO PHQ9 QUESTIONS 1 & 2: 0
6. FEELING BAD ABOUT YOURSELF - OR THAT YOU ARE A FAILURE OR HAVE LET YOURSELF OR YOUR FAMILY DOWN: NOT AT ALL
2. FEELING DOWN, DEPRESSED OR HOPELESS: NOT AT ALL
10. IF YOU CHECKED OFF ANY PROBLEMS, HOW DIFFICULT HAVE THESE PROBLEMS MADE IT FOR YOU TO DO YOUR WORK, TAKE CARE OF THINGS AT HOME, OR GET ALONG WITH OTHER PEOPLE: NOT DIFFICULT AT ALL
SUM OF ALL RESPONSES TO PHQ QUESTIONS 1-9: 0
1. LITTLE INTEREST OR PLEASURE IN DOING THINGS: NOT AT ALL
1. LITTLE INTEREST OR PLEASURE IN DOING THINGS: NOT AT ALL
3. TROUBLE FALLING OR STAYING ASLEEP: NOT AT ALL
8. MOVING OR SPEAKING SO SLOWLY THAT OTHER PEOPLE COULD HAVE NOTICED. OR THE OPPOSITE, BEING SO FIGETY OR RESTLESS THAT YOU HAVE BEEN MOVING AROUND A LOT MORE THAN USUAL: NOT AT ALL

## 2024-05-23 NOTE — PROGRESS NOTES
Subjective:      Patient ID: Liborio Ruiz is a 38 y.o. male.    Acute visit    Patient cut his fifth finger on the right hand while working with a knife, he is an  and he has to use blades as part of his work.  He went to emergency immediately he got his stitches.  He is coming today for suture removal and assessment of the wound.  Denied bleeding, denied swelling or pain.  Wound seems to be healing properly.  Sterile procedure was performed.  Skin area was cleaned with alcohol pads.  The sutures were removed with a minor scalp. Deeper layers has already healed.  No discharges, no bleeding.  Stitches were removed, successfully wound is cleaned again.  Wound has been covered.  I expect to completely heal at next couple of days.  If any problems arise is present will go to ED or call us.  He agreed.    Follow-up as scheduled.        Review of Systems    Objective:   Visit Vitals  /76 (Site: Left Upper Arm, Position: Sitting, Cuff Size: Medium Adult)   Pulse 75   Temp 97.3 °F (36.3 °C) (Temporal)   Resp 12   Ht 1.803 m (5' 11\")   Wt 91.9 kg (202 lb 9.6 oz)   SpO2 95%   BMI 28.26 kg/m²        Physical Exam  Cardiovascular:      Rate and Rhythm: Normal rate and regular rhythm.   Pulmonary:      Effort: Pulmonary effort is normal.      Breath sounds: Normal breath sounds.   Musculoskeletal:      Right hand: Laceration present. No swelling, deformity or tenderness.      Left hand: Normal.      Comments: Laceration about 1 cm long, regular borders, located in the fifth right finger. No discharges.  No signs of infection.   Neurological:      Mental Status: He is alert.         Assessment:       ICD-10-CM    1. Open wound  T14.8XXA AK Removal of Sutures     Suture removal kit              Plan:   Return in 8 weeks (on 7/17/2024), or if symptoms worsen or fail to improve.      Aakash Kelley MD

## 2024-05-23 NOTE — PROGRESS NOTES
\"Have you been to the ER, urgent care clinic since your last visit?  Hospitalized since your last visit?\"    YES - When: approximately 1  weeks ago.  Where and Why: [facility] for finger Laceration.    “Have you seen or consulted any other health care providers outside of [facility] since your last visit?”    NO            Click Here for Release of Records Request

## 2024-07-17 ENCOUNTER — TELEMEDICINE (OUTPATIENT)
Facility: CLINIC | Age: 39
End: 2024-07-17

## 2024-07-17 DIAGNOSIS — Z00.00 ANNUAL PHYSICAL EXAM: ICD-10-CM

## 2024-07-17 DIAGNOSIS — F33.1 MAJOR DEPRESSIVE DISORDER, RECURRENT, MODERATE (HCC): Primary | ICD-10-CM

## 2024-07-17 RX ORDER — ESCITALOPRAM OXALATE 20 MG/1
20 TABLET ORAL DAILY
Qty: 60 TABLET | Refills: 0 | Status: SHIPPED | OUTPATIENT
Start: 2024-07-17

## 2024-07-17 ASSESSMENT — ANXIETY QUESTIONNAIRES
IF YOU CHECKED OFF ANY PROBLEMS ON THIS QUESTIONNAIRE, HOW DIFFICULT HAVE THESE PROBLEMS MADE IT FOR YOU TO DO YOUR WORK, TAKE CARE OF THINGS AT HOME, OR GET ALONG WITH OTHER PEOPLE: VERY DIFFICULT
5. BEING SO RESTLESS THAT IT IS HARD TO SIT STILL: MORE THAN HALF THE DAYS
4. TROUBLE RELAXING: NEARLY EVERY DAY
GAD7 TOTAL SCORE: 14
3. WORRYING TOO MUCH ABOUT DIFFERENT THINGS: MORE THAN HALF THE DAYS
1. FEELING NERVOUS, ANXIOUS, OR ON EDGE: NEARLY EVERY DAY
2. NOT BEING ABLE TO STOP OR CONTROL WORRYING: SEVERAL DAYS
6. BECOMING EASILY ANNOYED OR IRRITABLE: NEARLY EVERY DAY
7. FEELING AFRAID AS IF SOMETHING AWFUL MIGHT HAPPEN: NOT AT ALL

## 2024-07-17 ASSESSMENT — PATIENT HEALTH QUESTIONNAIRE - PHQ9
SUM OF ALL RESPONSES TO PHQ QUESTIONS 1-9: 8
SUM OF ALL RESPONSES TO PHQ QUESTIONS 1-9: 8
7. TROUBLE CONCENTRATING ON THINGS, SUCH AS READING THE NEWSPAPER OR WATCHING TELEVISION: NOT AT ALL
8. MOVING OR SPEAKING SO SLOWLY THAT OTHER PEOPLE COULD HAVE NOTICED. OR THE OPPOSITE, BEING SO FIGETY OR RESTLESS THAT YOU HAVE BEEN MOVING AROUND A LOT MORE THAN USUAL: MORE THAN HALF THE DAYS
6. FEELING BAD ABOUT YOURSELF - OR THAT YOU ARE A FAILURE OR HAVE LET YOURSELF OR YOUR FAMILY DOWN: SEVERAL DAYS
10. IF YOU CHECKED OFF ANY PROBLEMS, HOW DIFFICULT HAVE THESE PROBLEMS MADE IT FOR YOU TO DO YOUR WORK, TAKE CARE OF THINGS AT HOME, OR GET ALONG WITH OTHER PEOPLE: VERY DIFFICULT
9. THOUGHTS THAT YOU WOULD BE BETTER OFF DEAD, OR OF HURTING YOURSELF: NOT AT ALL
1. LITTLE INTEREST OR PLEASURE IN DOING THINGS: SEVERAL DAYS
SUM OF ALL RESPONSES TO PHQ9 QUESTIONS 1 & 2: 2
3. TROUBLE FALLING OR STAYING ASLEEP: SEVERAL DAYS
SUM OF ALL RESPONSES TO PHQ QUESTIONS 1-9: 8
5. POOR APPETITE OR OVEREATING: NOT AT ALL
2. FEELING DOWN, DEPRESSED OR HOPELESS: SEVERAL DAYS
SUM OF ALL RESPONSES TO PHQ QUESTIONS 1-9: 8

## 2024-07-17 NOTE — PROGRESS NOTES
partial reponse to next appt, will add Buspar.  If Trial added Buspar does not help much, will change treated to an alternative medication group.  Patient understands and agree with the plan.              Depression and anxiety disorder  PHQ-9 Total Score: 0 (6/29/2023 11:15 AM)  PHQ-9 Total Score: 2 (10/2/2023  1:11 PM)  Thoughts that you would be better off dead, or of hurting yourself in some way: 0 (10/2/2023  1:11 PM)  PHQ-9 Total Score: 0 (1/24/2024  9:30 AM)  Thoughts that you would be better off dead, or of hurting yourself in some way: 0 (1/24/2024  9:30 AM)  PHQ-9 Total Score: 0 (4/17/2024  9:55 AM)  Thoughts that you would be better off dead, or of hurting yourself in some way: 0 (4/17/2024  9:55 AM)  PHQ-9 Total Score: 8 (7/17/2024  1:12 PM)  Thoughts that you would be better off dead, or of hurting yourself in some way: 0 (7/17/2024  1:12 PM)  Therapy every other week, Kody Stevenson.  Therapy and Lexapro 10  mg daily, increase to 20 mg daily on 7/17/2024.  Symptoms controlled     Hx of 2021 Left shoulder ligament partial tear s/p surgery     Hx of 08/2022 Umbilical hernia s/p surgical repair        PAST MEDICAL HISTORY  Medical: as listed.  Surgical: as listed.  Allergies: as listed.  Medication: as listed.  Family: parents skin cancer. Dad HTN.  Work: artist.   Social: tobacco denied, [place] socially, recreational drugs edible marihuana occasionally.   Sexual:  female, 2 children, STI herpes.         Depression  Anxiety          Review of Systems   Psychiatric/Behavioral:  Positive for depression.           Objective   Patient-Reported Vitals  No data recorded     Physical Exam  [INSTRUCTIONS:  \"[x]\" Indicates a positive item  \"[]\" Indicates a negative item  -- DELETE ALL ITEMS NOT EXAMINED]    Constitutional: [x] Appears well-developed and well-nourished [x] No apparent distress      [] Abnormal -     Mental status: [x] Alert and awake  [x] Oriented to person/place/time [x] Able to follow

## 2024-08-08 DIAGNOSIS — J45.20 MILD INTERMITTENT ASTHMA WITHOUT COMPLICATION: ICD-10-CM

## 2024-08-08 NOTE — TELEPHONE ENCOUNTER
Pt is traveling and has left his inhaler at home. Pt states that the air quality is not great where he is. Pt is requesting to have inhaler sent to pharmacy of location. Please advise

## 2024-08-09 RX ORDER — ALBUTEROL SULFATE 90 UG/1
INHALANT RESPIRATORY (INHALATION)
Qty: 18 G | Refills: 1 | Status: SHIPPED | OUTPATIENT
Start: 2024-08-09

## (undated) DEVICE — TROCAR: Brand: KII® SLEEVE

## (undated) DEVICE — LAPAROSCOPIC TROCAR SLEEVE/SINGLE USE: Brand: KII® OPTICAL ACCESS SYSTEM

## (undated) DEVICE — STAPLER INT DIA5MM 25 ABSRB STRP FIX DISP FOR HERN MESH

## (undated) DEVICE — SUTURE VCRL SZ 0 L36IN ABSRB UD L36MM CT-1 1/2 CIR J946H

## (undated) DEVICE — TROCAR: Brand: KII® OPTICAL ACCESS SYSTEM

## (undated) DEVICE — SOLUTION LACTATED RINGERS INJECTION USP

## (undated) DEVICE — PACK PROCEDURE SURG LAPAROSCOPY 17X7 MM BRTRC PRIMUS

## (undated) DEVICE — SUTURE VCRL SZ 2-0 L54IN ABSRB VLT W/O NDL POLYGLACTIN 910 J618H

## (undated) DEVICE — SOLUTION IRRIG 1000ML H2O STRL BLT

## (undated) DEVICE — SUTURE MCRYL SZ 4-0 L27IN ABSRB UD L24MM PS-1 3/8 CIR PRIM Y935H

## (undated) DEVICE — [HIGH FLOW INSUFFLATOR,  DO NOT USE IF PACKAGE IS DAMAGED,  KEEP DRY,  KEEP AWAY FROM SUNLIGHT,  PROTECT FROM HEAT AND RADIOACTIVE SOURCES.]: Brand: PNEUMOSURE